# Patient Record
Sex: MALE | Race: WHITE | Employment: OTHER | URBAN - METROPOLITAN AREA
[De-identification: names, ages, dates, MRNs, and addresses within clinical notes are randomized per-mention and may not be internally consistent; named-entity substitution may affect disease eponyms.]

---

## 2023-10-29 ENCOUNTER — APPOINTMENT (OUTPATIENT)
Dept: GENERAL RADIOLOGY | Age: 23
DRG: 871 | End: 2023-10-29
Payer: MEDICARE

## 2023-10-29 ENCOUNTER — HOSPITAL ENCOUNTER (INPATIENT)
Age: 23
LOS: 3 days | Discharge: HOME | DRG: 871 | End: 2023-11-02
Attending: STUDENT IN AN ORGANIZED HEALTH CARE EDUCATION/TRAINING PROGRAM | Admitting: INTERNAL MEDICINE
Payer: MEDICARE

## 2023-10-29 DIAGNOSIS — R50.9 FEVER, UNSPECIFIED FEVER CAUSE: ICD-10-CM

## 2023-10-29 DIAGNOSIS — R56.9 SEIZURE (HCC): Primary | ICD-10-CM

## 2023-10-29 LAB
ANION GAP SERPL CALCULATED.3IONS-SCNC: 12 MMOL/L (ref 3–16)
BASOPHILS # BLD: 0 K/UL (ref 0–0.2)
BASOPHILS NFR BLD: 0.1 %
BUN SERPL-MCNC: 21 MG/DL (ref 7–20)
CALCIUM SERPL-MCNC: 11.1 MG/DL (ref 8.3–10.6)
CHLORIDE SERPL-SCNC: 103 MMOL/L (ref 99–110)
CO2 SERPL-SCNC: 25 MMOL/L (ref 21–32)
CREAT SERPL-MCNC: <0.5 MG/DL (ref 0.9–1.3)
DEPRECATED RDW RBC AUTO: 13.3 % (ref 12.4–15.4)
EOSINOPHIL # BLD: 0 K/UL (ref 0–0.6)
EOSINOPHIL NFR BLD: 0.6 %
FLUAV RNA RESP QL NAA+PROBE: NOT DETECTED
FLUBV RNA RESP QL NAA+PROBE: NOT DETECTED
GFR SERPLBLD CREATININE-BSD FMLA CKD-EPI: >60 ML/MIN/{1.73_M2}
GLUCOSE SERPL-MCNC: 92 MG/DL (ref 70–99)
HCT VFR BLD AUTO: 43.6 % (ref 40.5–52.5)
HGB BLD-MCNC: 14.8 G/DL (ref 13.5–17.5)
LYMPHOCYTES # BLD: 1 K/UL (ref 1–5.1)
LYMPHOCYTES NFR BLD: 25.9 %
MCH RBC QN AUTO: 32.9 PG (ref 26–34)
MCHC RBC AUTO-ENTMCNC: 33.9 G/DL (ref 31–36)
MCV RBC AUTO: 97 FL (ref 80–100)
MONOCYTES # BLD: 0.3 K/UL (ref 0–1.3)
MONOCYTES NFR BLD: 6.9 %
NEUTROPHILS # BLD: 2.6 K/UL (ref 1.7–7.7)
NEUTROPHILS NFR BLD: 66.5 %
PLATELET # BLD AUTO: 132 K/UL (ref 135–450)
PLATELET # BLD AUTO: ABNORMAL K/UL (ref 135–450)
PMV BLD AUTO: ABNORMAL FL (ref 5–10.5)
POTASSIUM SERPL-SCNC: 5.4 MMOL/L (ref 3.5–5.1)
RBC # BLD AUTO: 4.49 M/UL (ref 4.2–5.9)
SARS-COV-2 RNA RESP QL NAA+PROBE: NOT DETECTED
SODIUM SERPL-SCNC: 140 MMOL/L (ref 136–145)
VALPROATE SERPL-MCNC: 83.4 UG/ML (ref 50–100)
WBC # BLD AUTO: 3.9 K/UL (ref 4–11)

## 2023-10-29 PROCEDURE — 85049 AUTOMATED PLATELET COUNT: CPT

## 2023-10-29 PROCEDURE — 87636 SARSCOV2 & INF A&B AMP PRB: CPT

## 2023-10-29 PROCEDURE — 6370000000 HC RX 637 (ALT 250 FOR IP): Performed by: STUDENT IN AN ORGANIZED HEALTH CARE EDUCATION/TRAINING PROGRAM

## 2023-10-29 PROCEDURE — 80164 ASSAY DIPROPYLACETIC ACD TOT: CPT

## 2023-10-29 PROCEDURE — 80048 BASIC METABOLIC PNL TOTAL CA: CPT

## 2023-10-29 PROCEDURE — 80177 DRUG SCRN QUAN LEVETIRACETAM: CPT

## 2023-10-29 PROCEDURE — 99285 EMERGENCY DEPT VISIT HI MDM: CPT

## 2023-10-29 PROCEDURE — 36415 COLL VENOUS BLD VENIPUNCTURE: CPT

## 2023-10-29 PROCEDURE — 71045 X-RAY EXAM CHEST 1 VIEW: CPT

## 2023-10-29 PROCEDURE — 0202U NFCT DS 22 TRGT SARS-COV-2: CPT

## 2023-10-29 PROCEDURE — 85025 COMPLETE CBC W/AUTO DIFF WBC: CPT

## 2023-10-29 RX ORDER — DIVALPROEX SODIUM 500 MG/1
500 TABLET, DELAYED RELEASE ORAL ONCE
Status: DISCONTINUED | OUTPATIENT
Start: 2023-10-29 | End: 2023-10-29

## 2023-10-29 RX ORDER — LEVETIRACETAM 100 MG/ML
800 SOLUTION ORAL 2 TIMES DAILY
Status: ON HOLD | COMMUNITY
End: 2023-11-02 | Stop reason: HOSPADM

## 2023-10-29 RX ORDER — DIVALPROEX SODIUM 500 MG/1
500 TABLET, DELAYED RELEASE ORAL 2 TIMES DAILY
COMMUNITY
End: 2023-10-30 | Stop reason: ALTCHOICE

## 2023-10-29 RX ORDER — ACETAMINOPHEN 160 MG/5ML
650 LIQUID ORAL ONCE
Status: COMPLETED | OUTPATIENT
Start: 2023-10-29 | End: 2023-10-29

## 2023-10-29 RX ORDER — DIVALPROEX SODIUM 125 MG/1
500 CAPSULE, COATED PELLETS ORAL ONCE
Status: COMPLETED | OUTPATIENT
Start: 2023-10-29 | End: 2023-10-29

## 2023-10-29 RX ORDER — LEVETIRACETAM 500 MG/5ML
800 INJECTION, SOLUTION, CONCENTRATE INTRAVENOUS ONCE
Status: COMPLETED | OUTPATIENT
Start: 2023-10-29 | End: 2023-10-30

## 2023-10-29 RX ADMIN — ACETAMINOPHEN 650 MG: 160 SOLUTION ORAL at 22:48

## 2023-10-29 RX ADMIN — DIVALPROEX SODIUM 500 MG: 125 CAPSULE, COATED PELLETS ORAL at 21:04

## 2023-10-29 NOTE — ED NOTES
Mom, sister and nurse from facility at [facility], [address] [place] , [place]  10/29/23 1931

## 2023-10-30 ENCOUNTER — APPOINTMENT (OUTPATIENT)
Dept: CT IMAGING | Age: 23
DRG: 871 | End: 2023-10-30
Payer: MEDICARE

## 2023-10-30 PROBLEM — Z86.79 HX OF MYOCARDITIS: Status: ACTIVE | Noted: 2023-10-30

## 2023-10-30 PROBLEM — A41.9 SEPSIS (HCC): Status: ACTIVE | Noted: 2023-10-30

## 2023-10-30 PROBLEM — R50.9 FEVER: Status: ACTIVE | Noted: 2023-10-30

## 2023-10-30 PROBLEM — R56.9 SEIZURE (HCC): Status: ACTIVE | Noted: 2023-10-30

## 2023-10-30 PROBLEM — R62.50 DEVELOPMENTAL DELAY: Status: ACTIVE | Noted: 2023-10-30

## 2023-10-30 LAB
ALBUMIN SERPL-MCNC: 4 G/DL (ref 3.4–5)
ALBUMIN/GLOB SERPL: 1.4 {RATIO} (ref 1.1–2.2)
ALP SERPL-CCNC: 54 U/L (ref 40–129)
ALT SERPL-CCNC: 12 U/L (ref 10–40)
AMMONIA PLAS-SCNC: 111 UMOL/L (ref 16–60)
ANION GAP SERPL CALCULATED.3IONS-SCNC: 12 MMOL/L (ref 3–16)
AST SERPL-CCNC: 25 U/L (ref 15–37)
BILIRUB SERPL-MCNC: 0.3 MG/DL (ref 0–1)
BILIRUB UR QL STRIP.AUTO: NEGATIVE
BUN SERPL-MCNC: 14 MG/DL (ref 7–20)
CALCIUM SERPL-MCNC: 9.8 MG/DL (ref 8.3–10.6)
CHLORIDE SERPL-SCNC: 106 MMOL/L (ref 99–110)
CK SERPL-CCNC: 233 U/L (ref 39–308)
CLARITY UR: CLEAR
CO2 SERPL-SCNC: 19 MMOL/L (ref 21–32)
COLOR UR: YELLOW
CREAT SERPL-MCNC: <0.5 MG/DL (ref 0.9–1.3)
CRP SERPL-MCNC: 128.7 MG/L (ref 0–5.1)
GFR SERPLBLD CREATININE-BSD FMLA CKD-EPI: >60 ML/MIN/{1.73_M2}
GLUCOSE SERPL-MCNC: 96 MG/DL (ref 70–99)
GLUCOSE UR STRIP.AUTO-MCNC: NEGATIVE MG/DL
HGB UR QL STRIP.AUTO: NEGATIVE
KETONES UR STRIP.AUTO-MCNC: NEGATIVE MG/DL
LACTATE BLDV-SCNC: 2.6 MMOL/L (ref 0.4–1.9)
LEUKOCYTE ESTERASE UR QL STRIP.AUTO: NEGATIVE
LEVETIRACETAM SERPL-MCNC: 11.9 UG/ML (ref 6–46)
MEDICATION DOSE-MCNC: NORMAL
NITRITE UR QL STRIP.AUTO: NEGATIVE
PH UR STRIP.AUTO: 8.5 [PH] (ref 5–8)
POTASSIUM SERPL-SCNC: 4 MMOL/L (ref 3.5–5.1)
PROCALCITONIN SERPL IA-MCNC: 6.74 NG/ML (ref 0–0.15)
PROCALCITONIN SERPL IA-MCNC: 8.31 NG/ML (ref 0–0.15)
PROT SERPL-MCNC: 6.8 G/DL (ref 6.4–8.2)
PROT UR STRIP.AUTO-MCNC: NEGATIVE MG/DL
REPORT: NORMAL
RESP PATH DNA+RNA PNL NPH NAA+NON-PROBE: NORMAL
SODIUM SERPL-SCNC: 137 MMOL/L (ref 136–145)
SP GR UR STRIP.AUTO: 1.01 (ref 1–1.03)
TROPONIN, HIGH SENSITIVITY: 19 NG/L (ref 0–22)
TROPONIN, HIGH SENSITIVITY: 23 NG/L (ref 0–22)
TSH SERPL DL<=0.005 MIU/L-ACNC: 1.39 UIU/ML (ref 0.27–4.2)
UA COMPLETE W REFLEX CULTURE PNL UR: ABNORMAL
UA DIPSTICK W REFLEX MICRO PNL UR: ABNORMAL
URN SPEC COLLECT METH UR: ABNORMAL
UROBILINOGEN UR STRIP-ACNC: 0.2 E.U./DL
VALPROATE SERPL-MCNC: 106.1 UG/ML (ref 50–100)

## 2023-10-30 PROCEDURE — 86140 C-REACTIVE PROTEIN: CPT

## 2023-10-30 PROCEDURE — 2060000000 HC ICU INTERMEDIATE R&B

## 2023-10-30 PROCEDURE — 83605 ASSAY OF LACTIC ACID: CPT

## 2023-10-30 PROCEDURE — 81003 URINALYSIS AUTO W/O SCOPE: CPT

## 2023-10-30 PROCEDURE — 84443 ASSAY THYROID STIM HORMONE: CPT

## 2023-10-30 PROCEDURE — 80053 COMPREHEN METABOLIC PANEL: CPT

## 2023-10-30 PROCEDURE — 84145 PROCALCITONIN (PCT): CPT

## 2023-10-30 PROCEDURE — 36415 COLL VENOUS BLD VENIPUNCTURE: CPT

## 2023-10-30 PROCEDURE — 70450 CT HEAD/BRAIN W/O DYE: CPT

## 2023-10-30 PROCEDURE — 92610 EVALUATE SWALLOWING FUNCTION: CPT

## 2023-10-30 PROCEDURE — 80164 ASSAY DIPROPYLACETIC ACD TOT: CPT

## 2023-10-30 PROCEDURE — 2580000003 HC RX 258: Performed by: STUDENT IN AN ORGANIZED HEALTH CARE EDUCATION/TRAINING PROGRAM

## 2023-10-30 PROCEDURE — 6370000000 HC RX 637 (ALT 250 FOR IP): Performed by: INTERNAL MEDICINE

## 2023-10-30 PROCEDURE — 99223 1ST HOSP IP/OBS HIGH 75: CPT | Performed by: PSYCHIATRY & NEUROLOGY

## 2023-10-30 PROCEDURE — 87449 NOS EACH ORGANISM AG IA: CPT

## 2023-10-30 PROCEDURE — 92526 ORAL FUNCTION THERAPY: CPT

## 2023-10-30 PROCEDURE — 71260 CT THORAX DX C+: CPT

## 2023-10-30 PROCEDURE — 2580000003 HC RX 258

## 2023-10-30 PROCEDURE — 99223 1ST HOSP IP/OBS HIGH 75: CPT

## 2023-10-30 PROCEDURE — 6360000002 HC RX W HCPCS: Performed by: STUDENT IN AN ORGANIZED HEALTH CARE EDUCATION/TRAINING PROGRAM

## 2023-10-30 PROCEDURE — 82550 ASSAY OF CK (CPK): CPT

## 2023-10-30 PROCEDURE — 6360000004 HC RX CONTRAST MEDICATION

## 2023-10-30 PROCEDURE — 93306 TTE W/DOPPLER COMPLETE: CPT

## 2023-10-30 PROCEDURE — 82140 ASSAY OF AMMONIA: CPT

## 2023-10-30 PROCEDURE — 6370000000 HC RX 637 (ALT 250 FOR IP): Performed by: STUDENT IN AN ORGANIZED HEALTH CARE EDUCATION/TRAINING PROGRAM

## 2023-10-30 PROCEDURE — 84484 ASSAY OF TROPONIN QUANT: CPT

## 2023-10-30 PROCEDURE — 96374 THER/PROPH/DIAG INJ IV PUSH: CPT

## 2023-10-30 PROCEDURE — 87040 BLOOD CULTURE FOR BACTERIA: CPT

## 2023-10-30 RX ORDER — DIVALPROEX SODIUM 500 MG/1
TABLET, DELAYED RELEASE ORAL
Status: DISCONTINUED
Start: 2023-10-30 | End: 2023-10-30 | Stop reason: WASHOUT

## 2023-10-30 RX ORDER — DEXAMETHASONE SODIUM PHOSPHATE 10 MG/ML
10 INJECTION, SOLUTION INTRAMUSCULAR; INTRAVENOUS EVERY 6 HOURS
Status: DISCONTINUED | OUTPATIENT
Start: 2023-10-30 | End: 2023-10-31

## 2023-10-30 RX ORDER — ONDANSETRON 2 MG/ML
4 INJECTION INTRAMUSCULAR; INTRAVENOUS EVERY 6 HOURS PRN
Status: DISCONTINUED | OUTPATIENT
Start: 2023-10-30 | End: 2023-11-02 | Stop reason: HOSPADM

## 2023-10-30 RX ORDER — SODIUM CHLORIDE 0.9 % (FLUSH) 0.9 %
5-40 SYRINGE (ML) INJECTION PRN
Status: DISCONTINUED | OUTPATIENT
Start: 2023-10-30 | End: 2023-11-02 | Stop reason: HOSPADM

## 2023-10-30 RX ORDER — PANTOPRAZOLE SODIUM 40 MG/10ML
40 INJECTION, POWDER, LYOPHILIZED, FOR SOLUTION INTRAVENOUS DAILY
Status: DISCONTINUED | OUTPATIENT
Start: 2023-10-30 | End: 2023-11-02 | Stop reason: HOSPADM

## 2023-10-30 RX ORDER — LACTULOSE 10 G/15ML
20 SOLUTION ORAL 3 TIMES DAILY
Status: DISCONTINUED | OUTPATIENT
Start: 2023-10-30 | End: 2023-11-02 | Stop reason: HOSPADM

## 2023-10-30 RX ORDER — LEVETIRACETAM 100 MG/ML
800 SOLUTION ORAL 2 TIMES DAILY
Status: DISCONTINUED | OUTPATIENT
Start: 2023-10-30 | End: 2023-10-30

## 2023-10-30 RX ORDER — DIVALPROEX SODIUM 125 MG/1
500 CAPSULE, COATED PELLETS ORAL 2 TIMES DAILY
Status: ON HOLD | COMMUNITY
End: 2023-11-02 | Stop reason: HOSPADM

## 2023-10-30 RX ORDER — SODIUM CHLORIDE 9 MG/ML
INJECTION, SOLUTION INTRAVENOUS PRN
Status: DISCONTINUED | OUTPATIENT
Start: 2023-10-30 | End: 2023-11-02 | Stop reason: HOSPADM

## 2023-10-30 RX ORDER — SODIUM CHLORIDE, SODIUM LACTATE, POTASSIUM CHLORIDE, CALCIUM CHLORIDE 600; 310; 30; 20 MG/100ML; MG/100ML; MG/100ML; MG/100ML
INJECTION, SOLUTION INTRAVENOUS CONTINUOUS
Status: ACTIVE | OUTPATIENT
Start: 2023-10-30 | End: 2023-10-30

## 2023-10-30 RX ORDER — LEVETIRACETAM 100 MG/ML
800 SOLUTION ORAL 2 TIMES DAILY
Status: DISCONTINUED | OUTPATIENT
Start: 2023-10-30 | End: 2023-11-01

## 2023-10-30 RX ORDER — ACETAMINOPHEN 325 MG/1
650 TABLET ORAL EVERY 6 HOURS PRN
Status: DISCONTINUED | OUTPATIENT
Start: 2023-10-30 | End: 2023-11-02 | Stop reason: HOSPADM

## 2023-10-30 RX ORDER — SODIUM CHLORIDE 0.9 % (FLUSH) 0.9 %
5-40 SYRINGE (ML) INJECTION EVERY 12 HOURS SCHEDULED
Status: DISCONTINUED | OUTPATIENT
Start: 2023-10-30 | End: 2023-11-02 | Stop reason: HOSPADM

## 2023-10-30 RX ORDER — ACETAMINOPHEN 650 MG/1
650 SUPPOSITORY RECTAL EVERY 6 HOURS PRN
Status: DISCONTINUED | OUTPATIENT
Start: 2023-10-30 | End: 2023-11-02 | Stop reason: HOSPADM

## 2023-10-30 RX ORDER — MAGNESIUM SULFATE IN WATER 40 MG/ML
2000 INJECTION, SOLUTION INTRAVENOUS PRN
Status: DISCONTINUED | OUTPATIENT
Start: 2023-10-30 | End: 2023-11-02 | Stop reason: HOSPADM

## 2023-10-30 RX ORDER — DIVALPROEX SODIUM 125 MG/1
500 CAPSULE, COATED PELLETS ORAL EVERY 12 HOURS SCHEDULED
Status: DISCONTINUED | OUTPATIENT
Start: 2023-10-30 | End: 2023-11-01

## 2023-10-30 RX ORDER — LEVETIRACETAM 500 MG/5ML
INJECTION, SOLUTION, CONCENTRATE INTRAVENOUS
Status: DISCONTINUED
Start: 2023-10-30 | End: 2023-10-30 | Stop reason: WASHOUT

## 2023-10-30 RX ORDER — 0.9 % SODIUM CHLORIDE 0.9 %
500 INTRAVENOUS SOLUTION INTRAVENOUS ONCE
Status: COMPLETED | OUTPATIENT
Start: 2023-10-30 | End: 2023-10-30

## 2023-10-30 RX ORDER — 0.9 % SODIUM CHLORIDE 0.9 %
1000 INTRAVENOUS SOLUTION INTRAVENOUS ONCE
Status: COMPLETED | OUTPATIENT
Start: 2023-10-30 | End: 2023-10-30

## 2023-10-30 RX ORDER — DIVALPROEX SODIUM 500 MG/1
500 TABLET, DELAYED RELEASE ORAL 2 TIMES DAILY
Status: DISCONTINUED | OUTPATIENT
Start: 2023-10-30 | End: 2023-10-30 | Stop reason: ALTCHOICE

## 2023-10-30 RX ORDER — SODIUM CHLORIDE 9 MG/ML
INJECTION, SOLUTION INTRAVENOUS CONTINUOUS
Status: DISCONTINUED | OUTPATIENT
Start: 2023-10-30 | End: 2023-10-30

## 2023-10-30 RX ORDER — ONDANSETRON 4 MG/1
4 TABLET, ORALLY DISINTEGRATING ORAL EVERY 8 HOURS PRN
Status: DISCONTINUED | OUTPATIENT
Start: 2023-10-30 | End: 2023-11-02 | Stop reason: HOSPADM

## 2023-10-30 RX ORDER — ENOXAPARIN SODIUM 100 MG/ML
30 INJECTION SUBCUTANEOUS EVERY EVENING
Status: DISCONTINUED | OUTPATIENT
Start: 2023-10-31 | End: 2023-11-02 | Stop reason: HOSPADM

## 2023-10-30 RX ORDER — POTASSIUM CHLORIDE 7.45 MG/ML
10 INJECTION INTRAVENOUS PRN
Status: DISCONTINUED | OUTPATIENT
Start: 2023-10-30 | End: 2023-11-02 | Stop reason: HOSPADM

## 2023-10-30 RX ORDER — DIVALPROEX SODIUM 500 MG/1
TABLET, EXTENDED RELEASE ORAL
Status: DISPENSED
Start: 2023-10-30 | End: 2023-10-30

## 2023-10-30 RX ADMIN — SODIUM CHLORIDE, POTASSIUM CHLORIDE, SODIUM LACTATE AND CALCIUM CHLORIDE: 600; 310; 30; 20 INJECTION, SOLUTION INTRAVENOUS at 12:48

## 2023-10-30 RX ADMIN — SODIUM CHLORIDE 500 ML: 9 INJECTION, SOLUTION INTRAVENOUS at 03:05

## 2023-10-30 RX ADMIN — DEXAMETHASONE SODIUM PHOSPHATE 10 MG: 10 INJECTION, SOLUTION INTRAMUSCULAR; INTRAVENOUS at 05:45

## 2023-10-30 RX ADMIN — IOPAMIDOL 75 ML: 755 INJECTION, SOLUTION INTRAVENOUS at 09:30

## 2023-10-30 RX ADMIN — DEXAMETHASONE SODIUM PHOSPHATE 10 MG: 10 INJECTION, SOLUTION INTRAMUSCULAR; INTRAVENOUS at 12:45

## 2023-10-30 RX ADMIN — CEFTRIAXONE SODIUM 2000 MG: 2 INJECTION, POWDER, FOR SOLUTION INTRAMUSCULAR; INTRAVENOUS at 17:22

## 2023-10-30 RX ADMIN — VANCOMYCIN HYDROCHLORIDE 1250 MG: 10 INJECTION, POWDER, LYOPHILIZED, FOR SOLUTION INTRAVENOUS at 08:40

## 2023-10-30 RX ADMIN — DEXAMETHASONE SODIUM PHOSPHATE 10 MG: 10 INJECTION, SOLUTION INTRAMUSCULAR; INTRAVENOUS at 18:22

## 2023-10-30 RX ADMIN — LEVETIRACETAM 800 MG: 100 INJECTION, SOLUTION INTRAVENOUS at 01:43

## 2023-10-30 RX ADMIN — Medication 800 MG: at 21:09

## 2023-10-30 RX ADMIN — VANCOMYCIN HYDROCHLORIDE 1250 MG: 10 INJECTION, POWDER, LYOPHILIZED, FOR SOLUTION INTRAVENOUS at 18:31

## 2023-10-30 RX ADMIN — LACTULOSE 20 G: 10 SOLUTION ORAL at 21:59

## 2023-10-30 RX ADMIN — DIVALPROEX SODIUM 500 MG: 125 CAPSULE, COATED PELLETS ORAL at 08:15

## 2023-10-30 RX ADMIN — SODIUM CHLORIDE 1000 ML: 9 INJECTION, SOLUTION INTRAVENOUS at 05:51

## 2023-10-30 RX ADMIN — CEFTRIAXONE SODIUM 2000 MG: 2 INJECTION, POWDER, FOR SOLUTION INTRAMUSCULAR; INTRAVENOUS at 05:52

## 2023-10-30 RX ADMIN — DEXAMETHASONE SODIUM PHOSPHATE 10 MG: 10 INJECTION, SOLUTION INTRAMUSCULAR; INTRAVENOUS at 23:44

## 2023-10-30 RX ADMIN — SODIUM CHLORIDE, PRESERVATIVE FREE 10 ML: 5 INJECTION INTRAVENOUS at 13:45

## 2023-10-30 RX ADMIN — Medication 800 MG: at 08:29

## 2023-10-30 ASSESSMENT — LIFESTYLE VARIABLES
HOW OFTEN DO YOU HAVE A DRINK CONTAINING ALCOHOL: NEVER
HOW MANY STANDARD DRINKS CONTAINING ALCOHOL DO YOU HAVE ON A TYPICAL DAY: PATIENT DOES NOT DRINK

## 2023-10-30 NOTE — PROGRESS NOTES
Speech Language Pathology  Swallowing Disorders and Dysphagia  Clinical Bedside Swallow Assessment  Facility/Department: [facility] PCU TELEMETRY    Instrumentation: Yes. MBSS is warranted to further assess oropharyngeal structures and function  Diet recommendation: IDDSI 4 Puree Solids; IDDSI 0 Thin Liquids; Meds crushed in puree as able  Risk management: upright for all intake, stay upright for at least 30 mins after intake, small bites/sips, assist feed, 1:1 supervision with intake, oral care 2-3x/day to reduce adverse affects in the event of aspiration, STRICT aspiration precautions, and hold PO and contact SLP if s/s of aspiration or worsening respiratory status develop. NAME:Jose Lou   : 2000 (20 y.o.)   MRN: [id number]  ROOM: [facility]/1923-57  ADMISSION DATE: 10/29/2023  PATIENT DIAGNOSIS(ES): Seizure ([address]) [R56.9]  Fever, unspecified fever cause [R50.9]  Chief Complaint   Patient presents with    Seizures     Hx of seizures caregiver states pt hasn't had seizures In 5 years states had one last week and today     Patient Active Problem List    Diagnosis Date Noted    Seizure ([address]) 10/30/2023    Fever 10/30/2023    Sepsis ([address]) 10/30/2023    Developmental delay 10/30/2023    Hx of myocarditis 10/30/2023     Past Medical History:   Diagnosis Date    Developmental non-verbal disorder     Murmur, heart     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 Aspiration is a diagnostic consideration.   2. [phone]

## 2023-10-30 NOTE — PROGRESS NOTES
Dr Frida Gonzalez made aware patient mother/ legal guardian named Minh  is refusing the new order of EEG. Patient mother also stated ativan does not work as a seizure breakthrough for the patient. They have used in the past she believes was Clonazepam. She stated it has been years since needing the medication.

## 2023-10-30 NOTE — ED NOTES
Mother refused UA, pt difficult stick uncooperative, stuck x2 unsuccessful at [address], [address], [place]  10/29/23 2033

## 2023-10-30 NOTE — CARE COORDINATION
Case Management Assessment  Initial Evaluation    Date/Time of Evaluation: 10/30/2023 4:02 PM  Assessment Completed by: Tara Han    If patient is discharged prior to next notation, then this note serves as note for discharge by case management. Patient Name: Tiffany Rios                   YOB: 2000  Diagnosis: Seizure ([address]) [R56.9]  Fever, unspecified fever cause [R50.9]                   Date / Time: 10/29/2023  5:38 PM    Patient Admission Status: Inpatient   Readmission Risk (Low < 19, Mod (19-27), High > 27): Readmission Risk Score: 10.9    Current PCP: No primary care provider on file. PCP verified by CM? Yes    Chart Reviewed: Yes      History Provided by: Child/Family (mother Joseline Benson)  Patient Orientation: Person, Alert and Oriented    Patient Cognition: Alert    Hospitalization in the last 30 days (Readmission):  No    If yes, Readmission Assessment in CM Navigator will be completed. Advance Directives:      Code Status: Full Code   Patient's Primary Decision Maker is: Legal Next of Kin    Primary Decision Maker: Sonja Sanchez - Parent, Legal Arianne Solares - [phone]    Discharge Planning:    Patient lives with: Other (Comment) (group home) Type of Home: Group Home  Primary Care Giver:  Other (Comment) (Lives at [address] [place] [address])  Patient Support Systems include: Family Members, Other (Comment) (group home)   Current Financial resources: Medicare  Current community resources: None  Current services prior to admission: None            Current DME:              Type of Home Care services:  None    ADLS  Prior functional level: Assistance with the following:, Bathing, Dressing, Toileting, Feeding, Cooking, Housework, Shopping, Mobility  Current functional level: Dressing, Bathing, Assistance with the following:, Feeding, Toileting, Cooking, Housework, Shopping, Mobility    PT AM-PAC:   /24  OT AM-PAC:   /24    Family can provide assistance at DC: No  Would you like

## 2023-10-30 NOTE — ED NOTES
Pt very difficult stick had to have clinical place iv with ultrasound, unable to get 2nd blood culture set     [place], [place]  10/30/23 8188

## 2023-10-30 NOTE — PLAN OF CARE
Problem: SLP Adult - Impaired Swallowing  Goal: By Discharge: Advance to least restrictive diet without signs or symptoms of aspiration for planned discharge setting. See evaluation for individualized goals. Note: SLP completed evaluation. Please refer to notes in EMR.       Gerri Art M.A., CF-SLP #[id number]  Speech-Language Pathologist  Desk: [phone]

## 2023-10-30 NOTE — ACP (ADVANCE CARE PLANNING)
Advance Care Planning     General Advance Care Planning (ACP) Conversation    Date of Conversation: 10/29/2023  Conducted with: Patient with Decision Making Capacity    Healthcare Decision Maker:    Primary Decision Maker: Ashley Alan - Parent, Legal Guardian - [phone]  Click here to complete [address] including selection of the Healthcare Decision Maker Relationship (ie \"Primary\"). Today we documented Decision Maker(s) consistent with Legal Next of Kin hierarchy.     Content/Action Overview:  DECLINED ACP Conversation - will revisit periodically  Reviewed DNR/DNI and patient elects Full Code (Attempt Resuscitation)        Length of Voluntary ACP Conversation in minutes:  <16 minutes (Non-Billable)    Gavino Newer

## 2023-10-30 NOTE — ED PROVIDER NOTES
Patient signed out to me by previous physician. Patient is a 23-year-old male with significant past medical history including history of seizures, developmental delay presenting with concern for seizure at home. Patient initially was afebrile but then developed fever in the ED. Chest x-ray was nonacute. CBC revealed a mild leukopenia at 3.9. Hemoglobin is within normal limits. BMP reveals mild hyper per kalemia likely secondary to hemolysis otherwise BMP is nonactionable. COVID and flu testing are negative. Blood cultures were ordered and pending. I did add on lactate which was mildly elevated 2.6. Will order fluids and repeat lactate. At time of signout pending urinalysis and plan is to admit patient to hospital service. Given seizure with fever today and order CT head. Did discuss lumbar puncture with mother discussion with mother would like to hold off until IR can perform LP. In discussion with hospitalist will empirically start antibiotics. Patient met the hospital service for further evaluation and treatment.      Pia Ramirez MD  11/05/23 5217

## 2023-10-30 NOTE — PROGRESS NOTES
Patient admitted to room 304 from er. Patient oriented to room, call light, bed rails, phone, lights and bathroom. Patient instructed about the schedule of the day including: vital sign frequency, lab draws, possible tests, frequency of MD and staff rounds, daily weights, I &O's and prescribed diet. Telemetry box in place, patient aware of placement and reason. Bed locked, in lowest position, side rails up 2/4, call light within reach. Recliner Assessment  Patient is not able to demonstrated the ability to move from a reclining position to an upright position within the recliner. Patient is confused, demented and /or unable to follow instruction. 4 Eyes Skin Assessment     NAME:  Juan Marrero  YOB: 2000  MEDICAL RECORD NUMBER:  [id number]    The patient is being assessed for  Admission    I agree that at least one RN has performed a thorough Head to Toe Skin Assessment on the patient. ALL assessment sites listed below have been assessed. Areas assessed by both nurses: scattered bruising and abrasion,     Head, Face, Ears, Shoulders, Back, Chest, Arms, Elbows, Hands, Sacrum. Buttock, Coccyx, Ischium, Legs. Feet and Heels, and Under Medical Devices         Does the Patient have a Wound?  No noted wound(s)       Priyank Prevention initiated by RN: No  Wound Care Orders initiated by RN: No    Pressure Injury (Stage 3,4, Unstageable, DTI, NWPT, and Complex wounds) if present, place Wound referral order by RN under : No    New Ostomies, if present place, Ostomy referral order under : No     Nurse 1 eSignature: Electronically signed by Karina Clemons RN on 10/30/23 at 2:47 PM EDT    **SHARE this note so that the co-signing nurse can place an eSignature**    Nurse 2 eSignature: Electronically signed by Debi Irizarry RN on 10/30/23 at [address] [facility] PM EDT

## 2023-10-30 NOTE — PROGRESS NOTES
New Telemetry box number assigned to Patient      To be filled out by [facility]    Patient assigned to tele box number: __________________               (to be written in by [facility] when telemetry box is assigned to patient)    ___________________________________________________________________________      Bedside RN confirming that the box listed above is in fact the telemetry box number being placed on the patient listed above.         X________________________________________ RN signature        __________________________________________RN assigned to Patient (please print)    _______________ Date    ____________ Time

## 2023-10-30 NOTE — CONSULTS
Neurology consultation note    Patient name: Raina Palomino      Chief Complaint:  Breakthrough seizure. History of present illness: This is a 20years old male who was brought to the ER last night due to breakthrough seizure. The patient is noted for history of seizure disorder and developmental delay. Per medical records, the patient had been seizure-free for at least 5 years prior to this breakthrough seizure. The patient is currently on Depakote and levetiracetam.  The patient had negative CT brain upon admission. The patient also ha febrile illness of admission. There is a concern for possible pneumonia.     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     Seizures (HCC)     Wheelchair dependent        Past surgical history:    Past Surgical History:   Procedure Laterality Date    CARDIAC SURGERY      SPINAL FIXATION SURGERY          Medication:    Current Facility-Administered Medications   Medication Dose Route Frequency Provider Last Rate Last Admin    cefTRIAXone (ROCEPHIN) 2,000 mg in sodium chloride 0.9 % 50 mL IVPB (mini-bag)  2,000 mg IntraVENous Q12H Gay Gerardo  mL/hr at 10/30/23 0552 2,000 mg at 10/30/23 0552    dexamethasone (PF) (DECADRON) injection 10 mg  10 mg IntraVENous Q6H Gay Gerardo MD   10 mg at 10/30/23 0545    vancomycin (VANCOCIN) 1,250 mg in sodium chloride 0.9 % 250 mL IVPB  1,250 mg IntraVENous Q12H Gay Gerardo .7 mL/hr at 10/30/23 0840 1,250 mg at 10/30/23 0840    lactated ringers IV soln infusion   IntraVENous Continuous Gay Gerardo MD        sodium chloride flush 0.9 % injection 5-40 mL  5-40 mL IntraVENous 2 times per day Gay Gerardo MD        sodium chloride flush 0.9 % injection 5-40 mL  5-40 mL IntraVENous PRN Gay Gerardo MD        0.9 % sodium chloride infusion   IntraVENous PRN Gay Gerardo

## 2023-10-30 NOTE — CONSULTS
Pharmacy Note  Vancomycin Consult    Angelica Darling is a 20 y.o. male started on Vancomycin for suspected meningitis; consult received from Dr. Eloise Fothergill to manage therapy. Also receiving the following antibiotics: ceftriaxone. Recent Labs     10/29/23  2030   BUN 21*   CREATININE <0.5*   WBC 3.9*       Estimated Creatinine Clearance: 131 mL/min (based on SCr of 0.5 mg/dL). Goal Trough Level: 15-20 mcg/mL  Goal AUC: 400-600 mg/L    Assessment/Plan:  Will initiate Vancomycin with 1250 mg IV every 12 hours. Per Pk-insight:  XJF52,CF: 409 mg/L.hr  Probability of AUC24 > 400: 75 %  Ctrough,ss: 12.6 mg/L  Probability of Ctrough,ss > 20: 20 %  Probability of nephrotoxicity (Lodise CRISSY 2009): 8 %    Next trough: 10/31 @ 0500    Thank you for the consult. Will continue to follow.     Wendy Snyder, PharmD, [facility], 10/30/2023 5:19 AM

## 2023-10-30 NOTE — ED NOTES
Pt uncooperative with obtaining vitals. Pt's skin is warm pink and dry. Respirations are unlabored. Pulse 72 via radial pulse.       Melody Driscoll RN  10/30/23 9879

## 2023-10-30 NOTE — PROGRESS NOTES
Updated Dr Nery Springer on patients critical lab level of ammonia 111. New orders of lactulose, and hold Depakote. Made patients mother aware, she stated she is not letting us stop his Depakote. This writer updated Dr Nery Springer on patients mothers resistance. Dr Nery Springer responded back   \"His ammonia level is elevated due to depakote . . ( it is a medication side effect) Once level comes down , they can resume Depakote\". Patient mother stepped out of the room, this writer attempted to call patients mother and update her, phone call went to voicemail. Addendum @ 1930 spoke with patients mother and updated her on Dr Laurie Henson response of still wanting to hold Depakote.

## 2023-10-31 ENCOUNTER — APPOINTMENT (OUTPATIENT)
Dept: GENERAL RADIOLOGY | Age: 23
DRG: 871 | End: 2023-10-31
Payer: MEDICARE

## 2023-10-31 LAB
AMMONIA PLAS-SCNC: 77 UMOL/L (ref 16–60)
ANION GAP SERPL CALCULATED.3IONS-SCNC: 11 MMOL/L (ref 3–16)
BASOPHILS # BLD: 0 K/UL (ref 0–0.2)
BASOPHILS NFR BLD: 0.2 %
BUN SERPL-MCNC: 20 MG/DL (ref 7–20)
CALCIUM SERPL-MCNC: 10.1 MG/DL (ref 8.3–10.6)
CHLORIDE SERPL-SCNC: 107 MMOL/L (ref 99–110)
CO2 SERPL-SCNC: 15 MMOL/L (ref 21–32)
CREAT SERPL-MCNC: <0.5 MG/DL (ref 0.9–1.3)
DEPRECATED RDW RBC AUTO: 13.6 % (ref 12.4–15.4)
EOSINOPHIL # BLD: 0 K/UL (ref 0–0.6)
EOSINOPHIL NFR BLD: 0 %
GFR SERPLBLD CREATININE-BSD FMLA CKD-EPI: >60 ML/MIN/{1.73_M2}
GLUCOSE SERPL-MCNC: 202 MG/DL (ref 70–99)
HCT VFR BLD AUTO: 40.5 % (ref 40.5–52.5)
HGB BLD-MCNC: 13.6 G/DL (ref 13.5–17.5)
LYMPHOCYTES # BLD: 0.5 K/UL (ref 1–5.1)
LYMPHOCYTES NFR BLD: 2.7 %
MCH RBC QN AUTO: 33.3 PG (ref 26–34)
MCHC RBC AUTO-ENTMCNC: 33.6 G/DL (ref 31–36)
MCV RBC AUTO: 98.9 FL (ref 80–100)
MONOCYTES # BLD: 0.4 K/UL (ref 0–1.3)
MONOCYTES NFR BLD: 2 %
NEUTROPHILS # BLD: 17.2 K/UL (ref 1.7–7.7)
NEUTROPHILS NFR BLD: 95.1 %
PLATELET # BLD AUTO: 97 K/UL (ref 135–450)
PLATELET BLD QL SMEAR: ABNORMAL
PMV BLD AUTO: 9.6 FL (ref 5–10.5)
POTASSIUM SERPL-SCNC: 4.3 MMOL/L (ref 3.5–5.1)
RBC # BLD AUTO: 4.1 M/UL (ref 4.2–5.9)
SLIDE REVIEW: ABNORMAL
SODIUM SERPL-SCNC: 133 MMOL/L (ref 136–145)
VALPROATE SERPL-MCNC: 55.5 UG/ML (ref 50–100)
VANCOMYCIN TROUGH SERPL-MCNC: 7.9 UG/ML (ref 10–20)
WBC # BLD AUTO: 18.1 K/UL (ref 4–11)

## 2023-10-31 PROCEDURE — 2060000000 HC ICU INTERMEDIATE R&B

## 2023-10-31 PROCEDURE — 80164 ASSAY DIPROPYLACETIC ACD TOT: CPT

## 2023-10-31 PROCEDURE — 85025 COMPLETE CBC W/AUTO DIFF WBC: CPT

## 2023-10-31 PROCEDURE — 99233 SBSQ HOSP IP/OBS HIGH 50: CPT | Performed by: PSYCHIATRY & NEUROLOGY

## 2023-10-31 PROCEDURE — C9113 INJ PANTOPRAZOLE SODIUM, VIA: HCPCS

## 2023-10-31 PROCEDURE — 6360000002 HC RX W HCPCS: Performed by: STUDENT IN AN ORGANIZED HEALTH CARE EDUCATION/TRAINING PROGRAM

## 2023-10-31 PROCEDURE — 36415 COLL VENOUS BLD VENIPUNCTURE: CPT

## 2023-10-31 PROCEDURE — 80048 BASIC METABOLIC PNL TOTAL CA: CPT

## 2023-10-31 PROCEDURE — 82140 ASSAY OF AMMONIA: CPT

## 2023-10-31 PROCEDURE — 2580000003 HC RX 258

## 2023-10-31 PROCEDURE — 80202 ASSAY OF VANCOMYCIN: CPT

## 2023-10-31 PROCEDURE — 74230 X-RAY XM SWLNG FUNCJ C+: CPT

## 2023-10-31 PROCEDURE — 2580000003 HC RX 258: Performed by: STUDENT IN AN ORGANIZED HEALTH CARE EDUCATION/TRAINING PROGRAM

## 2023-10-31 PROCEDURE — 92611 MOTION FLUOROSCOPY/SWALLOW: CPT

## 2023-10-31 PROCEDURE — 6370000000 HC RX 637 (ALT 250 FOR IP): Performed by: STUDENT IN AN ORGANIZED HEALTH CARE EDUCATION/TRAINING PROGRAM

## 2023-10-31 PROCEDURE — 6360000002 HC RX W HCPCS

## 2023-10-31 PROCEDURE — 99233 SBSQ HOSP IP/OBS HIGH 50: CPT | Performed by: INTERNAL MEDICINE

## 2023-10-31 PROCEDURE — 92526 ORAL FUNCTION THERAPY: CPT

## 2023-10-31 PROCEDURE — 6360000002 HC RX W HCPCS: Performed by: INTERNAL MEDICINE

## 2023-10-31 RX ORDER — DEXAMETHASONE SODIUM PHOSPHATE 10 MG/ML
6 INJECTION, SOLUTION INTRAMUSCULAR; INTRAVENOUS EVERY 6 HOURS
Status: DISCONTINUED | OUTPATIENT
Start: 2023-11-01 | End: 2023-11-01

## 2023-10-31 RX ADMIN — PANTOPRAZOLE SODIUM 40 MG: 40 INJECTION, POWDER, FOR SOLUTION INTRAVENOUS at 08:36

## 2023-10-31 RX ADMIN — DIVALPROEX SODIUM 500 MG: 125 CAPSULE, COATED PELLETS ORAL at 18:28

## 2023-10-31 RX ADMIN — DEXAMETHASONE SODIUM PHOSPHATE 10 MG: 10 INJECTION, SOLUTION INTRAMUSCULAR; INTRAVENOUS at 12:09

## 2023-10-31 RX ADMIN — Medication 800 MG: at 08:34

## 2023-10-31 RX ADMIN — DEXAMETHASONE SODIUM PHOSPHATE 10 MG: 10 INJECTION, SOLUTION INTRAMUSCULAR; INTRAVENOUS at 18:11

## 2023-10-31 RX ADMIN — DEXAMETHASONE SODIUM PHOSPHATE 10 MG: 10 INJECTION, SOLUTION INTRAMUSCULAR; INTRAVENOUS at 04:56

## 2023-10-31 RX ADMIN — CEFTRIAXONE SODIUM 2000 MG: 2 INJECTION, POWDER, FOR SOLUTION INTRAMUSCULAR; INTRAVENOUS at 18:20

## 2023-10-31 RX ADMIN — CEFTRIAXONE SODIUM 2000 MG: 2 INJECTION, POWDER, FOR SOLUTION INTRAMUSCULAR; INTRAVENOUS at 04:59

## 2023-10-31 RX ADMIN — Medication 10 ML: at 08:51

## 2023-10-31 RX ADMIN — SODIUM CHLORIDE, PRESERVATIVE FREE 10 ML: 5 INJECTION INTRAVENOUS at 08:37

## 2023-10-31 RX ADMIN — DEXAMETHASONE SODIUM PHOSPHATE 6 MG: 10 INJECTION, SOLUTION INTRAMUSCULAR; INTRAVENOUS at 23:43

## 2023-10-31 RX ADMIN — VANCOMYCIN HYDROCHLORIDE 1250 MG: 10 INJECTION, POWDER, LYOPHILIZED, FOR SOLUTION INTRAVENOUS at 06:05

## 2023-10-31 RX ADMIN — VANCOMYCIN HYDROCHLORIDE 1250 MG: 10 INJECTION, POWDER, LYOPHILIZED, FOR SOLUTION INTRAVENOUS at 19:45

## 2023-10-31 RX ADMIN — Medication 800 MG: at 20:58

## 2023-10-31 RX ADMIN — SODIUM CHLORIDE: 9 INJECTION, SOLUTION INTRAVENOUS at 18:18

## 2023-10-31 NOTE — PROGRESS NOTES
Pharmacy Vancomycin Consult     Vancomycin Day: 2  Current Dosin mg q12h  Current indication: suspected meningitis    Recent Labs     10/29/23  2030 10/30/23  0602 10/31/23  0451   BUN 21* 14 20   CREATININE <0.5* <0.5* <0.5*   WBC 3.9*  --  18.1*       Estimated Creatinine Clearance: 126 mL/min (based on SCr of 0.5 mg/dL). Trough: 7.9  AUC: 462  Procal: 6.74 (10/30)    Assessment/Plan:  Will continue with current dose. Pharmacy will continue to monitor.    Next trough: 11/3 @ [address] Christiana Davalos, [facility], 10/31/2023 6:27 AM

## 2023-10-31 NOTE — PLAN OF CARE
Problem: Safety - Adult  Goal: Free from fall injury  Outcome: Progressing  Flowsheets (Taken 10/31/2023 0032)  Free From Fall Injury: Instruct family/caregiver on patient safety     Problem: Skin/Tissue Integrity  Goal: Absence of new skin breakdown  Description: 1. Monitor for areas of redness and/or skin breakdown  2. Assess vascular access sites hourly  3. Every 4-6 hours minimum:  Change oxygen saturation probe site  4. Every 4-6 hours:  If on nasal continuous positive airway pressure, respiratory therapy assess nares and determine need for appliance change or resting period. Outcome: Progressing     Problem: Neurosensory - Adult  Goal: Absence of seizures  Outcome: Progressing  Flowsheets (Taken 10/31/2023 0032)  Absence of seizures: Monitor for seizure activity.   If seizure occurs, document type and location of movements and any associated apnea

## 2023-10-31 NOTE — PROGRESS NOTES
Patient VS and assessment as noted per flowsheet  -  oriented to self only. Confused as to place. Stated she didn't realize she was in the hospital.  Denying need to void at present. Bed alarm remains active.

## 2023-10-31 NOTE — PROGRESS NOTES
Patients mother is refusing EEG Testing at this time. Per RN Kiya Bis was notified.  EEG was not completed and therefore not available for interpretation at this time

## 2023-10-31 NOTE — PROGRESS NOTES
Patient's mom refusing the Lactulose and inisiting to have patient's Depakote be given at the time. Rush Nocturnist and NP. NP won't change the order because of elevated Ammonia and Depakote level in the blood. Informed patient's mom. She wanted to talk to a doctor or she will sign AMA just to get the medicine. Informed Clinical Becka and Charge Nurse Melissa Bates.

## 2023-10-31 NOTE — PROCEDURES
SPEECH/LANGUAGE PATHOLOGY  Swallowing Disorders and Dysphagia  VIDEOFLUOROSCOPIC STUDY OF SWALLOWING (MBS)  Facility/Department: [facility] PCU TELEMETRY    Diet Recommendation: IDDSI 4 Puree Solids ; IDDSI 0 Thin Liquids; Meds crushed in puree as able  Risk Management: upright for all intake, stay upright for at least 30 mins after intake, total feed, 1:1 supervision with intake, general GERD precautions, STRICT aspiration precautions, assess oral cavity for pocketing, Control risk factors for aspiration PNA by completing oral care 3-4x/day and increasing physical mobility as is medically feasible, and hold PO and contact SLP if s/s of aspiration or worsening respiratory status develop. PATIENT NAME:  Gaby Dove      :  2000    Room: /6542-16   TODAY'S DATE:  10/31/2023    [x]The admitting diagnosis and active problem list, as listed below have been reviewed prior to initiation of this evaluation. 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 Sims   Radiologist: Dr. Tracey Hall       Reason For Assessment: to assess oropharyngeal swallow function, identify signs and symptoms of aspiration and make recommendations regarding safe dietary consistencies, effective compensatory strategies, and safe eating environment.     DATE ONSET: 10/29/2023      Date of Evaluation: 10/30/2023   Evaluating Therapist: Princess Walker, SLP administered. Esophageal Phase: esophageal phase of the swallow grossly functional from lateral view of cervical esophagus to oropharynx      LARYNGEAL PENETRATION/ASPIRATION   Laryngeal penetration present: [] Yes   [x] No    Aspiration present:  [] Yes   [x] No      Response to aspiration: N/A- no aspiration occurred          Aspiration Scale   [x]  1 Material does not enter the airway   []  2 Material enters the airway, remains above the vocal folds, and is ejected from the airway   []  3 Material enters the airway, remains above the vocal folds, and is not ejected from the airway   []  4 Material enters the airway, contacts the vocal folds, an is ejected from the airway   []  5 Material enters the airway, contacts the vocal folds, and is not ejected from the airway   []  6 Material enters the airway, passes below the vocal folds and is ejected into the larynx or out of the airway   []  7 Material enters the airway, passes below the vocal folds, and is not ejected from the trachea despite effort   []  8 Material enters the airway, passes below the vocal folds, and no effort is made to eject. Assessment: mild-moderate oropharyngeal dysphagia, likely acute-on-chronic related to reduced physical mobility, impaired cognition, and generalized weakness Swallow safety is preserved ; swallow efficiency is preserved  Pt appears to be at low risk for aspiration PNA, and moderate risk for malnutrition/dehydration. Diet modification is warranted. Swallow prognosis is good given lack of clinical s/s of aspiration at bedside and caregiver support. Pt appears to be a fair candidate for swallow rehabilitation    Diet Recommendation: IDDSI 4 Puree Solids ; IDDSI 0 Thin Liquids;  Meds crushed in puree as able  Risk Management: upright for all intake, stay upright for at least 30 mins after intake, total feed, 1:1 supervision with intake, general GERD precautions, STRICT aspiration precautions, assess oral cavity for

## 2023-10-31 NOTE — PROGRESS NOTES
Speech Language Pathology  Swallowing Disorders and Dysphagia    Dysphagia Treatment/Follow-Up Note  Facility/Department: [facility] PCU TELEMETRY    Diet recommendation: IDDSI 4 Puree Solids; IDDSI 0 Thin Liquids; Meds crushed in puree as able  Risk management: upright for all intake, stay upright for at least 30 mins after intake, small bites/sips, assist feed, 1:1 supervision with intake, oral care 2-3x/day to reduce adverse affects in the event of aspiration, STRICT aspiration precautions, and hold PO and contact SLP if s/s of aspiration or worsening respiratory status develop. NAME:Jose Leslie  : 2000 (20 y.o.)   MRN: [id number]  ROOM: [facility]  ADMISSION DATE: 10/29/2023  PATIENT DIAGNOSIS(ES): Seizure ([address]) [R56.9]  Fever, unspecified fever cause [R50.9]  No Known Allergies    DATE ONSET: 10/29/2023    Pain: The patient does not complain of pain       Current Diet: ADULT DIET; Dysphagia - Pureed  ADULT ORAL NUTRITION SUPPLEMENT; AM Snack, PM Snack, HS Snack; Standard 4 oz Oral Supplement    Diet Tolerance:  Per RN and caregiver report, patient tolerating current diet level without signs/symptoms of aspiration. Dysphagia Treatment and Impressions:  Subjective: Pt seen in room at bedside with RN permission  RN Report/Chart Review:  Mother at bedside - reports pt will only tolerate smooth liquids and will independently remove any \"chunks\" himself   Patient tolerance: No concerns with current diet tolerance     Respiratory Status: Did not test as session was spent on caregiver education and discussing pt's diet    Liquid PO Trials:   Did not test as pt was asleep during SLP visit and session was spent on caregiver education and discussing pt's diet and upcoming MBS   Solid PO Trials  Did not test as pt was asleep during SLP visit and session was spent on caregiver education and discussing pt's diet and upcoming MBS     Education: SLP edu pt re: Role of SLP, Rationale for dysphagia tx,

## 2023-10-31 NOTE — CARE COORDINATION
Patient possibly going home with IV antibiotics. Met with the mother of the patient. CM advised the patient may need to DC with IV antibiotics. Group home is unable to do IV antibiotics. Mother of patient advised the patient will DC to home with her. She will give the antibiotics. Mother refusing SNF.

## 2023-10-31 NOTE — PROGRESS NOTES
MD and this writer came to speak with mom about reasons for holding Depakote dose tonight. Reeducated.  Mother agrees to Lactulose and holding Depakote tonight with redraw of Depakote level in am.

## 2023-11-01 LAB
AMMONIA PLAS-SCNC: 89 UMOL/L (ref 16–60)
ANION GAP SERPL CALCULATED.3IONS-SCNC: 13 MMOL/L (ref 3–16)
BASOPHILS # BLD: 0 K/UL (ref 0–0.2)
BASOPHILS NFR BLD: 0 %
BUN SERPL-MCNC: 25 MG/DL (ref 7–20)
CALCIUM SERPL-MCNC: 10.6 MG/DL (ref 8.3–10.6)
CHLORIDE SERPL-SCNC: 110 MMOL/L (ref 99–110)
CO2 SERPL-SCNC: 17 MMOL/L (ref 21–32)
CREAT SERPL-MCNC: <0.5 MG/DL (ref 0.9–1.3)
DEPRECATED RDW RBC AUTO: 13.8 % (ref 12.4–15.4)
EOSINOPHIL # BLD: 0 K/UL (ref 0–0.6)
EOSINOPHIL NFR BLD: 0 %
GFR SERPLBLD CREATININE-BSD FMLA CKD-EPI: >60 ML/MIN/{1.73_M2}
GLUCOSE BLD-MCNC: 261 MG/DL (ref 70–99)
GLUCOSE SERPL-MCNC: 259 MG/DL (ref 70–99)
HCT VFR BLD AUTO: 39.9 % (ref 40.5–52.5)
HGB BLD-MCNC: 13.5 G/DL (ref 13.5–17.5)
LYMPHOCYTES # BLD: 0.3 K/UL (ref 1–5.1)
LYMPHOCYTES NFR BLD: 1.9 %
MCH RBC QN AUTO: 32.9 PG (ref 26–34)
MCHC RBC AUTO-ENTMCNC: 33.8 G/DL (ref 31–36)
MCV RBC AUTO: 97.5 FL (ref 80–100)
MONOCYTES # BLD: 0.7 K/UL (ref 0–1.3)
MONOCYTES NFR BLD: 4 %
NEUTROPHILS # BLD: 15.4 K/UL (ref 1.7–7.7)
NEUTROPHILS NFR BLD: 94.1 %
PERFORMED ON: ABNORMAL
PLATELET # BLD AUTO: 138 K/UL (ref 135–450)
PMV BLD AUTO: 8.6 FL (ref 5–10.5)
POTASSIUM SERPL-SCNC: 4.2 MMOL/L (ref 3.5–5.1)
RBC # BLD AUTO: 4.09 M/UL (ref 4.2–5.9)
S PNEUM AG UR QL: NORMAL
SODIUM SERPL-SCNC: 140 MMOL/L (ref 136–145)
WBC # BLD AUTO: 16.4 K/UL (ref 4–11)

## 2023-11-01 PROCEDURE — 2060000000 HC ICU INTERMEDIATE R&B

## 2023-11-01 PROCEDURE — 99233 SBSQ HOSP IP/OBS HIGH 50: CPT | Performed by: PSYCHIATRY & NEUROLOGY

## 2023-11-01 PROCEDURE — 82140 ASSAY OF AMMONIA: CPT

## 2023-11-01 PROCEDURE — 83036 HEMOGLOBIN GLYCOSYLATED A1C: CPT

## 2023-11-01 PROCEDURE — 36415 COLL VENOUS BLD VENIPUNCTURE: CPT

## 2023-11-01 PROCEDURE — 99233 SBSQ HOSP IP/OBS HIGH 50: CPT | Performed by: INTERNAL MEDICINE

## 2023-11-01 PROCEDURE — 6370000000 HC RX 637 (ALT 250 FOR IP): Performed by: PSYCHIATRY & NEUROLOGY

## 2023-11-01 PROCEDURE — 6370000000 HC RX 637 (ALT 250 FOR IP): Performed by: INTERNAL MEDICINE

## 2023-11-01 PROCEDURE — 80048 BASIC METABOLIC PNL TOTAL CA: CPT

## 2023-11-01 PROCEDURE — 6370000000 HC RX 637 (ALT 250 FOR IP): Performed by: STUDENT IN AN ORGANIZED HEALTH CARE EDUCATION/TRAINING PROGRAM

## 2023-11-01 PROCEDURE — 85025 COMPLETE CBC W/AUTO DIFF WBC: CPT

## 2023-11-01 RX ORDER — AMOXICILLIN AND CLAVULANATE POTASSIUM 250; 62.5 MG/5ML; MG/5ML
500 POWDER, FOR SUSPENSION ORAL EVERY 8 HOURS
Status: DISCONTINUED | OUTPATIENT
Start: 2023-11-01 | End: 2023-11-02 | Stop reason: HOSPADM

## 2023-11-01 RX ORDER — GUAIFENESIN/DEXTROMETHORPHAN 100-10MG/5
5 SYRUP ORAL EVERY 6 HOURS
Status: DISCONTINUED | OUTPATIENT
Start: 2023-11-01 | End: 2023-11-02 | Stop reason: HOSPADM

## 2023-11-01 RX ORDER — DOXYCYCLINE HYCLATE 100 MG
50 TABLET ORAL EVERY 12 HOURS SCHEDULED
Status: DISCONTINUED | OUTPATIENT
Start: 2023-11-01 | End: 2023-11-02 | Stop reason: HOSPADM

## 2023-11-01 RX ORDER — LEVETIRACETAM 100 MG/ML
1000 SOLUTION ORAL 2 TIMES DAILY
Status: DISCONTINUED | OUTPATIENT
Start: 2023-11-01 | End: 2023-11-02 | Stop reason: HOSPADM

## 2023-11-01 RX ORDER — DIVALPROEX SODIUM 125 MG/1
250 CAPSULE, COATED PELLETS ORAL EVERY 12 HOURS SCHEDULED
Status: DISCONTINUED | OUTPATIENT
Start: 2023-11-01 | End: 2023-11-02 | Stop reason: HOSPADM

## 2023-11-01 RX ADMIN — DIVALPROEX SODIUM 500 MG: 125 CAPSULE, COATED PELLETS ORAL at 08:25

## 2023-11-01 RX ADMIN — Medication 1000 MG: at 20:10

## 2023-11-01 RX ADMIN — DIVALPROEX SODIUM 250 MG: 125 CAPSULE, COATED PELLETS ORAL at 20:11

## 2023-11-01 RX ADMIN — AMOXICILLIN AND CLAVULANATE POTASSIUM 500 MG: 250; 62.5 POWDER, FOR SUSPENSION ORAL at 16:11

## 2023-11-01 RX ADMIN — LACTULOSE 20 G: 10 SOLUTION ORAL at 16:11

## 2023-11-01 RX ADMIN — DOXYCYCLINE HYCLATE 50 MG: 100 TABLET, COATED ORAL at 20:10

## 2023-11-01 RX ADMIN — Medication 800 MG: at 08:25

## 2023-11-01 RX ADMIN — GUAIFENESIN AND DEXTROMETHORPHAN 5 ML: 100; 10 SYRUP ORAL at 20:10

## 2023-11-01 RX ADMIN — AMOXICILLIN AND CLAVULANATE POTASSIUM 500 MG: 250; 62.5 POWDER, FOR SUSPENSION ORAL at 22:02

## 2023-11-01 RX ADMIN — GUAIFENESIN AND DEXTROMETHORPHAN 5 ML: 100; 10 SYRUP ORAL at 16:10

## 2023-11-01 NOTE — PROGRESS NOTES
Patient w/ no BM as of yet today  -  lactulose given. Also oral antibiotics initiated.   Appears comfortable, mom at bedside

## 2023-11-01 NOTE — PROGRESS NOTES
IV line infiltrated, removed and dressing applied. Informed charge nurse since patient's mom told this writer that he is a hard stick and that they used ultrasound on the last IV access. CN informed clinical Walter Reason and suggested to have a PICC - mom refused. Charge nurse explained the pros of having the PICC.  Mom said that she will wait for the morning doctor if they would allow to have him on oral.

## 2023-11-01 NOTE — PLAN OF CARE
Problem: Discharge Planning  Goal: Discharge to home or other facility with appropriate resources  Outcome: Progressing     Problem: Pain  Goal: Verbalizes/displays adequate comfort level or baseline comfort level  Outcome: Progressing     Problem: Safety - Adult  Goal: Free from fall injury  Outcome: Progressing     Problem: Skin/Tissue Integrity  Goal: Absence of new skin breakdown  Outcome: Progressing     Problem: Neurosensory - Adult  Goal: Absence of seizures  Outcome: Progressing

## 2023-11-01 NOTE — PROGRESS NOTES
End of shift report given to Terrell Piña    care transferred.   Patient resting in bed, family at bedside,

## 2023-11-01 NOTE — PLAN OF CARE
Problem: Neurosensory - Adult  Goal: Absence of seizures  11/1/2023 0013 by Zhanna Eduardo RN  Outcome: Progressing  Flowsheets (Taken 11/1/2023 0013)  Absence of seizures: Monitor for seizure activity. If seizure occurs, document type and location of movements and any associated apnea  10/31/2023 2037 by Crissy Trevino RN  Outcome: Progressing     Problem: Skin/Tissue Integrity  Goal: Absence of new skin breakdown  Description: 1. Monitor for areas of redness and/or skin breakdown  2. Assess vascular access sites hourly  3. Every 4-6 hours minimum:  Change oxygen saturation probe site  4. Every 4-6 hours:  If on nasal continuous positive airway pressure, respiratory therapy assess nares and determine need for appliance change or resting period.   11/1/2023 0013 by Zhanna Eduardo RN  Outcome: Progressing  10/31/2023 2037 by Crissy Trevino RN  Outcome: Progressing     Problem: Safety - Adult  Goal: Free from fall injury  11/1/2023 0013 by Zhanna Eduardo RN  Outcome: Progressing  Flowsheets (Taken 11/1/2023 0013)  Free From Fall Injury: Instruct family/caregiver on patient safety  10/31/2023 2037 by Crissy Trevino RN  Outcome: Progressing     Problem: Discharge Planning  Goal: Discharge to home or other facility with appropriate resources  11/1/2023 0013 by Zhanna Eduardo RN  Outcome: Progressing  Flowsheets (Taken 11/1/2023 0013)  Discharge to home or other facility with appropriate resources: Identify barriers to discharge with patient and caregiver  10/31/2023 2037 by Crissy Trevino RN  Outcome: Progressing

## 2023-11-01 NOTE — PROGRESS NOTES
End of shift report given to [facility]  -  care transferred. Patient resting in bed. Telesitter in place.

## 2023-11-01 NOTE — CARE COORDINATION
Per Dr. Tiffany Bosch the patient should be switching to PO antibiotics. If on PO antibiotics will discharge to [facility].

## 2023-11-01 NOTE — PROGRESS NOTES
Patient assessment as noted per flowsheet  -  being seen/evaluated by neurology at present. Patient requesting no IV until speaking w/ hospitalist.  Also wanting to hold off on lactulose for now to see if he has BM without it. Per Dr. Solitario Jean, patient should be good with just 1 good BM/day.

## 2023-11-02 VITALS
DIASTOLIC BLOOD PRESSURE: 79 MMHG | RESPIRATION RATE: 16 BRPM | TEMPERATURE: 97.4 F | HEIGHT: 60 IN | OXYGEN SATURATION: 93 % | HEART RATE: 80 BPM | WEIGHT: 88.1 LBS | SYSTOLIC BLOOD PRESSURE: 126 MMHG | BODY MASS INDEX: 17.3 KG/M2

## 2023-11-02 LAB
AMMONIA PLAS-SCNC: 41 UMOL/L (ref 16–60)
ANION GAP SERPL CALCULATED.3IONS-SCNC: 9 MMOL/L (ref 3–16)
BASOPHILS # BLD: 0 K/UL (ref 0–0.2)
BASOPHILS NFR BLD: 0.1 %
BUN SERPL-MCNC: 22 MG/DL (ref 7–20)
CALCIUM SERPL-MCNC: 10.3 MG/DL (ref 8.3–10.6)
CHLORIDE SERPL-SCNC: 107 MMOL/L (ref 99–110)
CO2 SERPL-SCNC: 21 MMOL/L (ref 21–32)
CREAT SERPL-MCNC: <0.5 MG/DL (ref 0.9–1.3)
DEPRECATED RDW RBC AUTO: 13.6 % (ref 12.4–15.4)
EOSINOPHIL # BLD: 0 K/UL (ref 0–0.6)
EOSINOPHIL NFR BLD: 0.1 %
EST. AVERAGE GLUCOSE BLD GHB EST-MCNC: 96.8 MG/DL
GFR SERPLBLD CREATININE-BSD FMLA CKD-EPI: >60 ML/MIN/{1.73_M2}
GLUCOSE BLD-MCNC: 183 MG/DL (ref 70–99)
GLUCOSE SERPL-MCNC: 117 MG/DL (ref 70–99)
HBA1C MFR BLD: 5 %
HCT VFR BLD AUTO: 41.9 % (ref 40.5–52.5)
HGB BLD-MCNC: 14.1 G/DL (ref 13.5–17.5)
LYMPHOCYTES # BLD: 1.1 K/UL (ref 1–5.1)
LYMPHOCYTES NFR BLD: 11 %
MCH RBC QN AUTO: 33.1 PG (ref 26–34)
MCHC RBC AUTO-ENTMCNC: 33.6 G/DL (ref 31–36)
MCV RBC AUTO: 98.3 FL (ref 80–100)
MONOCYTES # BLD: 1 K/UL (ref 0–1.3)
MONOCYTES NFR BLD: 10 %
NEUTROPHILS # BLD: 8.3 K/UL (ref 1.7–7.7)
NEUTROPHILS NFR BLD: 78.8 %
PERFORMED ON: ABNORMAL
PLATELET # BLD AUTO: 144 K/UL (ref 135–450)
PMV BLD AUTO: 8.4 FL (ref 5–10.5)
POTASSIUM SERPL-SCNC: 4.6 MMOL/L (ref 3.5–5.1)
RBC # BLD AUTO: 4.27 M/UL (ref 4.2–5.9)
SODIUM SERPL-SCNC: 137 MMOL/L (ref 136–145)
WBC # BLD AUTO: 10.5 K/UL (ref 4–11)

## 2023-11-02 PROCEDURE — 80048 BASIC METABOLIC PNL TOTAL CA: CPT

## 2023-11-02 PROCEDURE — 99238 HOSP IP/OBS DSCHRG MGMT 30/<: CPT | Performed by: INTERNAL MEDICINE

## 2023-11-02 PROCEDURE — 6370000000 HC RX 637 (ALT 250 FOR IP): Performed by: PSYCHIATRY & NEUROLOGY

## 2023-11-02 PROCEDURE — 85025 COMPLETE CBC W/AUTO DIFF WBC: CPT

## 2023-11-02 PROCEDURE — 36415 COLL VENOUS BLD VENIPUNCTURE: CPT

## 2023-11-02 PROCEDURE — 99233 SBSQ HOSP IP/OBS HIGH 50: CPT | Performed by: PSYCHIATRY & NEUROLOGY

## 2023-11-02 PROCEDURE — 6370000000 HC RX 637 (ALT 250 FOR IP): Performed by: INTERNAL MEDICINE

## 2023-11-02 PROCEDURE — 82140 ASSAY OF AMMONIA: CPT

## 2023-11-02 RX ORDER — AMOXICILLIN AND CLAVULANATE POTASSIUM 250; 62.5 MG/5ML; MG/5ML
500 POWDER, FOR SUSPENSION ORAL EVERY 8 HOURS
Qty: 150 ML | Refills: 0 | Status: SHIPPED | OUTPATIENT
Start: 2023-11-02 | End: 2023-11-07

## 2023-11-02 RX ORDER — PANTOPRAZOLE SODIUM 20 MG/1
20 TABLET, DELAYED RELEASE ORAL DAILY
Qty: 30 TABLET | Refills: 0 | Status: SHIPPED | OUTPATIENT
Start: 2023-11-02 | End: 2023-12-02

## 2023-11-02 RX ORDER — DOXYCYCLINE HYCLATE 50 MG/1
50 TABLET, FILM COATED ORAL EVERY 12 HOURS SCHEDULED
Qty: 11 TABLET | Refills: 0 | Status: SHIPPED | OUTPATIENT
Start: 2023-11-02 | End: 2023-11-08

## 2023-11-02 RX ORDER — LACTULOSE 10 G/15ML
10 SOLUTION ORAL DAILY PRN
Qty: 237 ML | Refills: 0 | Status: SHIPPED | OUTPATIENT
Start: 2023-11-02 | End: 2023-12-02

## 2023-11-02 RX ORDER — DIVALPROEX SODIUM 125 MG/1
250 CAPSULE, COATED PELLETS ORAL EVERY 12 HOURS SCHEDULED
Qty: 120 CAPSULE | Refills: 0 | Status: SHIPPED | OUTPATIENT
Start: 2023-11-02 | End: 2023-12-02

## 2023-11-02 RX ORDER — GUAIFENESIN/DEXTROMETHORPHAN 100-10MG/5
5 SYRUP ORAL EVERY 6 HOURS
Qty: 120 ML | COMMUNITY
Start: 2023-11-02 | End: 2023-11-12

## 2023-11-02 RX ORDER — LEVETIRACETAM 100 MG/ML
1000 SOLUTION ORAL 2 TIMES DAILY
Qty: 600 ML | Refills: 0 | Status: SHIPPED | OUTPATIENT
Start: 2023-11-02 | End: 2023-12-02

## 2023-11-02 RX ADMIN — AMOXICILLIN AND CLAVULANATE POTASSIUM 500 MG: 250; 62.5 POWDER, FOR SUSPENSION ORAL at 05:41

## 2023-11-02 RX ADMIN — DIVALPROEX SODIUM 250 MG: 125 CAPSULE, COATED PELLETS ORAL at 09:40

## 2023-11-02 RX ADMIN — GUAIFENESIN AND DEXTROMETHORPHAN 5 ML: 100; 10 SYRUP ORAL at 01:57

## 2023-11-02 RX ADMIN — Medication 1000 MG: at 09:48

## 2023-11-02 RX ADMIN — GUAIFENESIN AND DEXTROMETHORPHAN 5 ML: 100; 10 SYRUP ORAL at 09:42

## 2023-11-02 NOTE — PLAN OF CARE
Problem: Safety - Adult  Goal: Free from fall injury  11/2/2023 0051 by Saint Sale, RN  Outcome: Progressing  Flowsheets (Taken 11/2/2023 0051)  Free From Fall Injury: Instruct family/caregiver on patient safety  11/1/2023 1640 by Tyrone Joe RN  Outcome: Progressing     Problem: Skin/Tissue Integrity  Goal: Absence of new skin breakdown  Description: 1. Monitor for areas of redness and/or skin breakdown  2. Assess vascular access sites hourly  3. Every 4-6 hours minimum:  Change oxygen saturation probe site  4. Every 4-6 hours:  If on nasal continuous positive airway pressure, respiratory therapy assess nares and determine need for appliance change or resting period. 11/2/2023 0051 by Saint Sale, RN  Outcome: Progressing  11/1/2023 1640 by Tyrone Joe RN  Outcome: Progressing     Problem: Neurosensory - Adult  Goal: Absence of seizures  11/2/2023 0051 by Saint Sale, RN  Outcome: Progressing  Flowsheets (Taken 11/2/2023 0051)  Absence of seizures:   Monitor for seizure activity.   If seizure occurs, document type and location of movements and any associated apnea   If seizure occurs, turn head to side and suction secretions as needed  11/1/2023 1640 by Tyrone Joe RN  Outcome: Progressing     Problem: Discharge Planning  Goal: Discharge to home or other facility with appropriate resources  11/1/2023 1640 by Tyrone Joe RN  Outcome: Progressing

## 2023-11-02 NOTE — CARE COORDINATION
DISCHARGE ORDER  Date/Time 2023 11:36 AM  Completed by: Jennifer Buckner, Case Management    Patient Name: Vin Garay      : 2000  Admitting Diagnosis: Seizure ([address]) [R56.9]  Fever, unspecified fever cause [R50.9]      Admit order Date and Status: 10/30/23 stable  (verify MD's last order for status of admission)      Noted discharge order. If applicable PT/OT recommendation at Discharge: NA    Confirmed discharge plan: Yes  with whom_Ruthann___________  If pt confirmed DC plan does family need to be contacted by CM No     Discharge Plan: Reviewed chart, noted DC order, met with pt at bedside. Pt to be DC home to [facility] today, [place] providing transportation. Denies and HC/DME at DC. Date of Last IMM Given: 2023    Reviewed chart. Role of discharge planner explained and patient verbalized understanding. Discharge order is noted. Has Home O2 in place on admit:  No  Informed of need to bring portable home O2 tank on day of discharge for nursing to connect prior to leaving:   Not Indicated  Verbalized agreement/Understanding:   Not Indicated    Pt is being d/c'd to [facility] today. Pt's O2 sats are 93% on RA. Discharge timeout done with Pt, [place] mother, Adela Ace at [address] home. All discharge needs and concerns addressed.

## 2023-11-02 NOTE — PLAN OF CARE
Problem: Discharge Planning  Goal: Discharge to home or other facility with appropriate resources  Outcome: Progressing  Flowsheets (Taken 11/2/2023 1128)  Discharge to home or other facility with appropriate resources:   Identify barriers to discharge with patient and caregiver   Identify discharge learning needs (meds, wound care, etc)     Problem: Pain  Goal: Verbalizes/displays adequate comfort level or baseline comfort level  Outcome: Progressing  Flowsheets (Taken 11/2/2023 1128)  Verbalizes/displays adequate comfort level or baseline comfort level:   Assess pain using appropriate pain scale   Implement non-pharmacological measures as appropriate and evaluate response   Notify Licensed Independent Practitioner if interventions unsuccessful or patient reports new pain     Problem: Safety - Adult  Goal: Free from fall injury  11/2/2023 1128 by Milka Bauer RN  Outcome: Ericka Weaver (Taken 11/2/2023 1128)  Free From Fall Injury:   Instruct family/caregiver on patient safety   Based on caregiver fall risk screen, instruct family/caregiver to ask for assistance with transferring infant if caregiver noted to have fall risk factors  11/2/2023 0051 by Zhanna Eduardo RN  Outcome: Progressing  Flowsheets (Taken 11/2/2023 0051)  Free From Fall Injury: Instruct family/caregiver on patient safety     Problem: Skin/Tissue Integrity  Goal: Absence of new skin breakdown  Description: 1. Monitor for areas of redness and/or skin breakdown  2. Assess vascular access sites hourly  3. Every 4-6 hours minimum:  Change oxygen saturation probe site  4. Every 4-6 hours:  If on nasal continuous positive airway pressure, respiratory therapy assess nares and determine need for appliance change or resting period.   11/2/2023 1128 by Milka Bauer RN  Outcome: Progressing  11/2/2023 0051 by Zhanna Eduardo RN  Outcome: Progressing     Problem: Neurosensory - Adult  Goal: Absence of

## 2023-11-02 NOTE — PROGRESS NOTES
Patient educated on discharge instructions as well as new medications use, dosage, administration and possible side effects. Patient verified knowledge. IV removed without difficulty and dry dressing in place. Telemetry monitor removed and returned to [facility]. Pt left facility in stable condition to Nursing Home with all of their personal belongings.

## 2023-11-02 NOTE — PLAN OF CARE
Problem: Discharge Planning  Goal: Discharge to home or other facility with appropriate resources  11/2/2023 1358 by Helene Long RN  Outcome: Completed  11/2/2023 1128 by Helene Long RN  Outcome: Progressing  Flowsheets (Taken 11/2/2023 1128)  Discharge to home or other facility with appropriate resources:   Identify barriers to discharge with patient and caregiver   Identify discharge learning needs (meds, wound care, etc)     Problem: Pain  Goal: Verbalizes/displays adequate comfort level or baseline comfort level  11/2/2023 1358 by Helene Long RN  Outcome: Completed  11/2/2023 1128 by Helene Long RN  Outcome: Progressing  Flowsheets (Taken 11/2/2023 1128)  Verbalizes/displays adequate comfort level or baseline comfort level:   Assess pain using appropriate pain scale   Implement non-pharmacological measures as appropriate and evaluate response   Notify Licensed Independent Practitioner if interventions unsuccessful or patient reports new pain     Problem: Safety - Adult  Goal: Free from fall injury  11/2/2023 1358 by Helene Long RN  Outcome: Completed  11/2/2023 1128 by Helene Long RN  Outcome: Progressing  Flowsheets (Taken 11/2/2023 1128)  Free From Fall Injury:   Instruct family/caregiver on patient safety   Based on caregiver fall risk screen, instruct family/caregiver to ask for assistance with transferring infant if caregiver noted to have fall risk factors  11/2/2023 0051 by Violet Funez RN  Outcome: Progressing  Flowsheets (Taken 11/2/2023 0051)  Free From Fall Injury: Instruct family/caregiver on patient safety     Problem: Skin/Tissue Integrity  Goal: Absence of new skin breakdown  Description: 1. Monitor for areas of redness and/or skin breakdown  2. Assess vascular access sites hourly  3. Every 4-6 hours minimum:  Change oxygen saturation probe site  4.   Every 4-6 hours:  If on nasal continuous positive airway pressure, respiratory therapy

## 2023-11-02 NOTE — DISCHARGE INSTRUCTIONS
Please follow-up with neurology for continued Depakote weaning and with primary care for an after hospitalization check-up.

## 2023-11-02 NOTE — DISCHARGE SUMMARY
Hospital Medicine Discharge Summary    Patient: Miguel Gutierrez     Gender: male  : 2000   Age: 20 y.o. MRN: [id number]    Admitting Physician: Earnest Villegas DO  Discharge Physician: Earnest Villegas DO    Code Status: Full Code     Admit Date: 10/29/2023   Discharge Date: 2023      Discharge Diagnoses: Active Hospital Problems    Diagnosis Date Noted    Seizure ([address]) [R56.9] 10/30/2023    Fever [R50.9] 10/30/2023    Sepsis ([address]) [A41.9] 10/30/2023    Developmental delay [R62.50] 10/30/2023    Hx of myocarditis [Z86.79] 10/30/2023       Condition at Discharge: Stable    Hospital Course: The patient is a 20 y.o. male with pmhx of seizure disorder, developmental delay who presented to [facility] ED with concern for seizure. Patient is non-verbal group home resident. Caregiver was concerned for seizure like activity last week and then again yesterday. Mom at bedside reports patient has not had seizure in 5 years. He is on depakote and keppra and has not missed any doses reportedly. #Seizure disorder  Elevated Ammonia level  -has reportedly not had a major seizure in 5 years however whenever he has an infection he has had short staring spells and then will almost seem to stop breathing and lips may turn blue briefly as well. Mom says these have been much more common for him and have not previously warranted a change in therapy.   -CT head negative for acute pathology   -on depakote and keppra   -seizure precautions   -EEG pending but Mom did not allow, patient however does seem to be rapidly improving and bases on description sounds like a common spell for him when he is sick, continue to monitor  -depakote level elevated, trending down, ammonia level elevated, trending down - no longer elevated  -neurology consulted   -started on lactulose, depakote restarted lower dose and keppra also increased  -plan is for further depakote weaning outpatient  -needs to have at least one bowel movement

## 2023-11-03 LAB
BACTERIA BLD CULT ORG #2: NORMAL
BACTERIA BLD CULT: NORMAL

## 2023-12-27 ENCOUNTER — APPOINTMENT (OUTPATIENT)
Dept: GENERAL RADIOLOGY | Age: 23
End: 2023-12-27
Payer: MEDICARE

## 2023-12-27 ENCOUNTER — HOSPITAL ENCOUNTER (EMERGENCY)
Age: 23
Discharge: HOME | End: 2023-12-27
Payer: MEDICARE

## 2023-12-27 VITALS
SYSTOLIC BLOOD PRESSURE: 97 MMHG | HEART RATE: 116 BPM | TEMPERATURE: 97.6 F | DIASTOLIC BLOOD PRESSURE: 84 MMHG | RESPIRATION RATE: 39 BRPM | OXYGEN SATURATION: 95 %

## 2023-12-27 DIAGNOSIS — U07.1 COVID-19: Primary | ICD-10-CM

## 2023-12-27 LAB
ALBUMIN SERPL-MCNC: 4.6 G/DL (ref 3.4–5)
ALBUMIN/GLOB SERPL: 1.3 {RATIO} (ref 1.1–2.2)
ALP SERPL-CCNC: 66 U/L (ref 40–129)
ALT SERPL-CCNC: 15 U/L (ref 10–40)
ANION GAP SERPL CALCULATED.3IONS-SCNC: 13 MMOL/L (ref 3–16)
AST SERPL-CCNC: 26 U/L (ref 15–37)
BASE EXCESS BLDV CALC-SCNC: -1.1 MMOL/L (ref -3–3)
BASOPHILS # BLD: 0 K/UL (ref 0–0.2)
BASOPHILS NFR BLD: 0.3 %
BILIRUB SERPL-MCNC: 0.4 MG/DL (ref 0–1)
BUN SERPL-MCNC: 23 MG/DL (ref 7–20)
CALCIUM SERPL-MCNC: 10 MG/DL (ref 8.3–10.6)
CHLORIDE SERPL-SCNC: 105 MMOL/L (ref 99–110)
CO2 BLDV-SCNC: 23 MMOL/L
CO2 SERPL-SCNC: 23 MMOL/L (ref 21–32)
COHGB MFR BLDV: 0.9 % (ref 0–1.5)
CREAT SERPL-MCNC: <0.5 MG/DL (ref 0.9–1.3)
DEPRECATED RDW RBC AUTO: 12.9 % (ref 12.4–15.4)
EOSINOPHIL # BLD: 0 K/UL (ref 0–0.6)
EOSINOPHIL NFR BLD: 0 %
FLUAV RNA RESP QL NAA+PROBE: NOT DETECTED
FLUBV RNA RESP QL NAA+PROBE: NOT DETECTED
GFR SERPLBLD CREATININE-BSD FMLA CKD-EPI: >60 ML/MIN/{1.73_M2}
GLUCOSE SERPL-MCNC: 93 MG/DL (ref 70–99)
HCO3 BLDV-SCNC: 21.7 MMOL/L (ref 23–29)
HCT VFR BLD AUTO: 41.6 % (ref 40.5–52.5)
HGB BLD-MCNC: 14.3 G/DL (ref 13.5–17.5)
LACTATE BLDV-SCNC: 1.1 MMOL/L (ref 0.4–1.9)
LYMPHOCYTES # BLD: 1 K/UL (ref 1–5.1)
LYMPHOCYTES NFR BLD: 14.8 %
MCH RBC QN AUTO: 32 PG (ref 26–34)
MCHC RBC AUTO-ENTMCNC: 34.4 G/DL (ref 31–36)
MCV RBC AUTO: 92.9 FL (ref 80–100)
METHGB MFR BLDV: 0.1 %
MONOCYTES # BLD: 1.5 K/UL (ref 0–1.3)
MONOCYTES NFR BLD: 21.2 %
NEUTROPHILS # BLD: 4.4 K/UL (ref 1.7–7.7)
NEUTROPHILS NFR BLD: 63.7 %
O2 CT VFR BLDV CALC: 21 VOL %
O2 THERAPY: ABNORMAL
PCO2 BLDV: 31.5 MMHG (ref 40–50)
PH BLDV: 7.46 [PH] (ref 7.35–7.45)
PLATELET # BLD AUTO: 132 K/UL (ref 135–450)
PMV BLD AUTO: 9.7 FL (ref 5–10.5)
PO2 BLDV: 93.3 MMHG (ref 25–40)
POTASSIUM SERPL-SCNC: 4.3 MMOL/L (ref 3.5–5.1)
PROCALCITONIN SERPL IA-MCNC: 0.1 NG/ML (ref 0–0.15)
PROT SERPL-MCNC: 8.1 G/DL (ref 6.4–8.2)
RBC # BLD AUTO: 4.47 M/UL (ref 4.2–5.9)
SAO2 % BLDV: 98 %
SARS-COV-2 RNA RESP QL NAA+PROBE: DETECTED
SODIUM SERPL-SCNC: 141 MMOL/L (ref 136–145)
VALPROATE SERPL-MCNC: 51.8 UG/ML (ref 50–100)
WBC # BLD AUTO: 6.8 K/UL (ref 4–11)

## 2023-12-27 PROCEDURE — 80177 DRUG SCRN QUAN LEVETIRACETAM: CPT

## 2023-12-27 PROCEDURE — 85025 COMPLETE CBC W/AUTO DIFF WBC: CPT

## 2023-12-27 PROCEDURE — 83605 ASSAY OF LACTIC ACID: CPT

## 2023-12-27 PROCEDURE — 80164 ASSAY DIPROPYLACETIC ACD TOT: CPT

## 2023-12-27 PROCEDURE — 6370000000 HC RX 637 (ALT 250 FOR IP): Performed by: PHYSICIAN ASSISTANT

## 2023-12-27 PROCEDURE — 36415 COLL VENOUS BLD VENIPUNCTURE: CPT

## 2023-12-27 PROCEDURE — 82803 BLOOD GASES ANY COMBINATION: CPT

## 2023-12-27 PROCEDURE — 71045 X-RAY EXAM CHEST 1 VIEW: CPT

## 2023-12-27 PROCEDURE — 87636 SARSCOV2 & INF A&B AMP PRB: CPT

## 2023-12-27 PROCEDURE — 99284 EMERGENCY DEPT VISIT MOD MDM: CPT

## 2023-12-27 PROCEDURE — 87040 BLOOD CULTURE FOR BACTERIA: CPT

## 2023-12-27 PROCEDURE — 80053 COMPREHEN METABOLIC PANEL: CPT

## 2023-12-27 PROCEDURE — 84145 PROCALCITONIN (PCT): CPT

## 2023-12-27 RX ORDER — LEVETIRACETAM 100 MG/ML
1000 SOLUTION ORAL ONCE
Status: COMPLETED | OUTPATIENT
Start: 2023-12-27 | End: 2023-12-27

## 2023-12-27 RX ORDER — 0.9 % SODIUM CHLORIDE 0.9 %
500 INTRAVENOUS SOLUTION INTRAVENOUS ONCE
Status: DISCONTINUED | OUTPATIENT
Start: 2023-12-27 | End: 2023-12-27

## 2023-12-27 RX ORDER — GUAIFENESIN 200 MG/10ML
200 LIQUID ORAL ONCE
Status: DISCONTINUED | OUTPATIENT
Start: 2023-12-27 | End: 2023-12-27 | Stop reason: HOSPADM

## 2023-12-27 RX ORDER — GUAIFENESIN/DEXTROMETHORPHAN 100-10MG/5
5 SYRUP ORAL 3 TIMES DAILY PRN
Qty: 120 ML | Refills: 0 | Status: SHIPPED | OUTPATIENT
Start: 2023-12-27 | End: 2024-01-06

## 2023-12-27 RX ORDER — DIVALPROEX SODIUM 125 MG/1
250 CAPSULE, COATED PELLETS ORAL ONCE
Status: COMPLETED | OUTPATIENT
Start: 2023-12-27 | End: 2023-12-27

## 2023-12-27 RX ORDER — DEXTROMETHORPHAN HBR. AND GUAIFENESIN 10; 100 MG/5ML; MG/5ML
10 SOLUTION ORAL EVERY 6 HOURS PRN
Qty: 236 ML | Refills: 0 | Status: SHIPPED | OUTPATIENT
Start: 2023-12-27 | End: 2024-01-06

## 2023-12-27 RX ADMIN — Medication 1000 MG: at 18:53

## 2023-12-27 RX ADMIN — DIVALPROEX SODIUM 250 MG: 125 CAPSULE, COATED PELLETS ORAL at 18:53

## 2023-12-27 NOTE — ED PROVIDER NOTES
Medication List as of 12/27/2023  8:24 PM        START taking these medications    Details   dextromethorphan-guaiFENesin (ROBITUSSIN-DM)  MG/5ML syrup Take 10 mLs by mouth every 6 hours as needed for Cough (cough and shortness of breath), Disp-236 mL, R-0Print             DISCONTINUED MEDICATIONS:  Discharge Medication List as of 12/27/2023  8:24 PM                 (Please note that portions of this note were completed with a voice recognition program.  Efforts were made to edit the dictations but occasionally words are mis-transcribed.)    Yennifer Medina PA-C (electronically signed)            Yennifer Medina PA-C  12/27/23 5291

## 2023-12-28 LAB
LEVETIRACETAM SERPL-MCNC: 29.7 UG/ML (ref 6–46)
MEDICATION DOSE-MCNC: NORMAL

## 2023-12-28 NOTE — DISCHARGE INSTRUCTIONS
Positive COVID test and which is consistent with patient not feeling well for the next several days. No evidence of pneumonia is seen on chest xray, no evidence of sepsis. Encourage adequate fluid and Tylenol/Motrin for fever control. Return or take patient to [facility] for further evaluation if there are new or worsening of symptoms.

## 2023-12-31 LAB
BACTERIA BLD CULT ORG #2: NORMAL
BACTERIA BLD CULT: NORMAL

## 2024-01-05 ENCOUNTER — APPOINTMENT (OUTPATIENT)
Dept: CT IMAGING | Age: 24
End: 2024-01-05
Payer: MEDICARE

## 2024-01-05 ENCOUNTER — APPOINTMENT (OUTPATIENT)
Dept: GENERAL RADIOLOGY | Age: 24
End: 2024-01-05
Payer: MEDICARE

## 2024-01-05 ENCOUNTER — HOSPITAL ENCOUNTER (INPATIENT)
Age: 24
LOS: 8 days | Discharge: HOME | End: 2024-01-13
Attending: INTERNAL MEDICINE | Admitting: INTERNAL MEDICINE
Payer: MEDICARE

## 2024-01-05 ENCOUNTER — HOSPITAL ENCOUNTER (EMERGENCY)
Age: 24
Discharge: ANOTHER HOSPITAL | End: 2024-01-05
Attending: EMERGENCY MEDICINE
Payer: MEDICARE

## 2024-01-05 ENCOUNTER — APPOINTMENT (OUTPATIENT)
Dept: GENERAL RADIOLOGY | Age: 24
End: 2024-01-05
Attending: INTERNAL MEDICINE
Payer: MEDICARE

## 2024-01-05 VITALS
WEIGHT: 96 LBS | BODY MASS INDEX: 24.03 KG/M2 | SYSTOLIC BLOOD PRESSURE: 108 MMHG | DIASTOLIC BLOOD PRESSURE: 74 MMHG | HEART RATE: 108 BPM | OXYGEN SATURATION: 100 % | RESPIRATION RATE: 21 BRPM | TEMPERATURE: 99.3 F

## 2024-01-05 DIAGNOSIS — G40.901 STATUS EPILEPTICUS (HCC): ICD-10-CM

## 2024-01-05 DIAGNOSIS — G40.919 BREAKTHROUGH SEIZURE (HCC): Primary | ICD-10-CM

## 2024-01-05 PROBLEM — T17.500A MUCOID IMPACTION OF BRONCHI: Status: ACTIVE | Noted: 2024-01-05

## 2024-01-05 PROBLEM — J96.01 ACUTE RESPIRATORY FAILURE WITH HYPOXIA (HCC): Status: ACTIVE | Noted: 2024-01-05

## 2024-01-05 PROBLEM — J20.9 ACUTE BRONCHITIS: Status: ACTIVE | Noted: 2024-01-05

## 2024-01-05 PROBLEM — J98.11 ATELECTASIS OF LEFT LUNG: Status: ACTIVE | Noted: 2024-01-05

## 2024-01-05 LAB
ALBUMIN SERPL-MCNC: 4.2 G/DL (ref 3.4–5)
ALBUMIN/GLOB SERPL: 1.5 {RATIO} (ref 1.1–2.2)
ALP SERPL-CCNC: 82 U/L (ref 40–129)
ALT SERPL-CCNC: 21 U/L (ref 10–40)
AMMONIA PLAS-SCNC: 48 UMOL/L (ref 16–60)
AMPHETAMINES UR QL SCN>1000 NG/ML: ABNORMAL
ANION GAP SERPL CALCULATED.3IONS-SCNC: 12 MMOL/L (ref 3–16)
AST SERPL-CCNC: 38 U/L (ref 15–37)
BARBITURATES UR QL SCN>200 NG/ML: ABNORMAL
BASE EXCESS BLDV CALC-SCNC: -5.6 MMOL/L (ref -3–3)
BASE EXCESS BLDV CALC-SCNC: -6.9 MMOL/L (ref -3–3)
BASOPHILS # BLD: 0 K/UL (ref 0–0.2)
BASOPHILS NFR BLD: 0 %
BENZODIAZ UR QL SCN>200 NG/ML: POSITIVE
BILIRUB SERPL-MCNC: <0.2 MG/DL (ref 0–1)
BILIRUB UR QL STRIP.AUTO: NEGATIVE
BUN SERPL-MCNC: 26 MG/DL (ref 7–20)
CALCIUM SERPL-MCNC: 8.7 MG/DL (ref 8.3–10.6)
CANNABINOIDS UR QL SCN>50 NG/ML: ABNORMAL
CHLORIDE SERPL-SCNC: 107 MMOL/L (ref 99–110)
CLARITY UR: CLEAR
CO2 BLDV-SCNC: 24 MMOL/L
CO2 BLDV-SCNC: 24 MMOL/L
CO2 SERPL-SCNC: 22 MMOL/L (ref 21–32)
COCAINE UR QL SCN: ABNORMAL
COHGB MFR BLDV: 0.6 % (ref 0–1.5)
COHGB MFR BLDV: 1.4 % (ref 0–1.5)
COLOR UR: ABNORMAL
CREAT SERPL-MCNC: <0.5 MG/DL (ref 0.9–1.3)
DEPRECATED RDW RBC AUTO: 13.1 % (ref 12.4–15.4)
DRUG SCREEN COMMENT UR-IMP: ABNORMAL
EKG ATRIAL RATE: 159 BPM
EKG DIAGNOSIS: NORMAL
EKG P AXIS: 24 DEGREES
EKG P-R INTERVAL: 130 MS
EKG Q-T INTERVAL: 252 MS
EKG QRS DURATION: 102 MS
EKG QTC CALCULATION (BAZETT): 409 MS
EKG R AXIS: 119 DEGREES
EKG T AXIS: -12 DEGREES
EKG VENTRICULAR RATE: 159 BPM
EOSINOPHIL # BLD: 0 K/UL (ref 0–0.6)
EOSINOPHIL NFR BLD: 0 %
ETHANOLAMINE SERPL-MCNC: NORMAL MG/DL (ref 0–0.08)
FENTANYL SCREEN, URINE: ABNORMAL
FLUAV RNA RESP QL NAA+PROBE: NOT DETECTED
FLUBV RNA RESP QL NAA+PROBE: NOT DETECTED
GFR SERPLBLD CREATININE-BSD FMLA CKD-EPI: >60 ML/MIN/{1.73_M2}
GLUCOSE SERPL-MCNC: 189 MG/DL (ref 70–99)
GLUCOSE UR STRIP.AUTO-MCNC: 100 MG/DL
HCO3 BLDV-SCNC: 21.9 MMOL/L (ref 23–29)
HCO3 BLDV-SCNC: 22.4 MMOL/L (ref 23–29)
HCT VFR BLD AUTO: 42.7 % (ref 40.5–52.5)
HGB BLD-MCNC: 14 G/DL (ref 13.5–17.5)
HGB UR QL STRIP.AUTO: ABNORMAL
HYALINE CASTS #/AREA URNS LPF: ABNORMAL /LPF (ref 0–2)
KETONES UR STRIP.AUTO-MCNC: NEGATIVE MG/DL
LACTATE BLDV-SCNC: 2.1 MMOL/L (ref 0.4–2)
LEUKOCYTE ESTERASE UR QL STRIP.AUTO: NEGATIVE
LEVETIRACETAM SERPL-MCNC: >100 UG/ML (ref 6–46)
LYMPHOCYTES # BLD: 1.1 K/UL (ref 1–5.1)
LYMPHOCYTES NFR BLD: 8 %
MCH RBC QN AUTO: 31.4 PG (ref 26–34)
MCHC RBC AUTO-ENTMCNC: 32.8 G/DL (ref 31–36)
MCV RBC AUTO: 95.6 FL (ref 80–100)
MEDICATION DOSE-MCNC: ABNORMAL
METHADONE UR QL SCN>300 NG/ML: ABNORMAL
METHGB MFR BLDV: 0.3 %
METHGB MFR BLDV: 0.3 %
MONOCYTES # BLD: 1.3 K/UL (ref 0–1.3)
MONOCYTES NFR BLD: 10 %
NEUTROPHILS # BLD: 10.9 K/UL (ref 1.7–7.7)
NEUTROPHILS NFR BLD: 82 %
NITRITE UR QL STRIP.AUTO: NEGATIVE
O2 CT VFR BLDV CALC: 12 VOL %
O2 CT VFR BLDV CALC: 16 VOL %
O2 THERAPY: ABNORMAL
O2 THERAPY: ABNORMAL
OPIATES UR QL SCN>300 NG/ML: ABNORMAL
OXYCODONE UR QL SCN: ABNORMAL
PCO2 BLDV: 53.7 MMHG (ref 40–50)
PCO2 BLDV: 56.6 MMHG (ref 40–50)
PCP UR QL SCN>25 NG/ML: ABNORMAL
PH BLDV: 7.21 [PH] (ref 7.35–7.45)
PH BLDV: 7.24 [PH] (ref 7.35–7.45)
PH UR STRIP.AUTO: 7 [PH] (ref 5–8)
PH UR STRIP: 7 [PH]
PLATELET # BLD AUTO: 234 K/UL (ref 135–450)
PLATELET BLD QL SMEAR: ADEQUATE
PMV BLD AUTO: 9.4 FL (ref 5–10.5)
PO2 BLDV: 35.3 MMHG (ref 25–40)
PO2 BLDV: 44.4 MMHG (ref 25–40)
POTASSIUM SERPL-SCNC: 4.1 MMOL/L (ref 3.5–5.1)
PROCALCITONIN SERPL IA-MCNC: 1.8 NG/ML (ref 0–0.15)
PROT SERPL-MCNC: 7 G/DL (ref 6.4–8.2)
PROT UR STRIP.AUTO-MCNC: NEGATIVE MG/DL
RBC # BLD AUTO: 4.47 M/UL (ref 4.2–5.9)
RBC #/AREA URNS HPF: ABNORMAL /HPF (ref 0–4)
RENAL EPI CELLS #/AREA UR COMP ASSIST: ABNORMAL /HPF (ref 0–1)
SAO2 % BLDV: 57 %
SAO2 % BLDV: 70 %
SARS-COV-2 RNA RESP QL NAA+PROBE: DETECTED
SLIDE REVIEW: ABNORMAL
SODIUM SERPL-SCNC: 141 MMOL/L (ref 136–145)
SP GR UR STRIP.AUTO: 1.02 (ref 1–1.03)
UA COMPLETE W REFLEX CULTURE PNL UR: ABNORMAL
UA DIPSTICK W REFLEX MICRO PNL UR: YES
URN SPEC COLLECT METH UR: ABNORMAL
UROBILINOGEN UR STRIP-ACNC: 0.2 E.U./DL
VALPROATE SERPL-MCNC: 43 UG/ML (ref 50–100)
WBC # BLD AUTO: 13.3 K/UL (ref 4–11)
WBC #/AREA URNS HPF: ABNORMAL /HPF (ref 0–5)

## 2024-01-05 PROCEDURE — 36415 COLL VENOUS BLD VENIPUNCTURE: CPT

## 2024-01-05 PROCEDURE — 96368 THER/DIAG CONCURRENT INF: CPT

## 2024-01-05 PROCEDURE — 84450 TRANSFERASE (AST) (SGOT): CPT

## 2024-01-05 PROCEDURE — 80307 DRUG TEST PRSMV CHEM ANLYZR: CPT

## 2024-01-05 PROCEDURE — APPNB30 APP NON BILLABLE TIME 0-30 MINS

## 2024-01-05 PROCEDURE — 72125 CT NECK SPINE W/O DYE: CPT

## 2024-01-05 PROCEDURE — 2580000003 HC RX 258: Performed by: EMERGENCY MEDICINE

## 2024-01-05 PROCEDURE — 84145 PROCALCITONIN (PCT): CPT

## 2024-01-05 PROCEDURE — 2500000003 HC RX 250 WO HCPCS: Performed by: EMERGENCY MEDICINE

## 2024-01-05 PROCEDURE — 87636 SARSCOV2 & INF A&B AMP PRB: CPT

## 2024-01-05 PROCEDURE — 6360000002 HC RX W HCPCS: Performed by: EMERGENCY MEDICINE

## 2024-01-05 PROCEDURE — 31645 BRNCHSC W/THER ASPIR 1ST: CPT | Performed by: INTERNAL MEDICINE

## 2024-01-05 PROCEDURE — 2000000000 HC ICU R&B

## 2024-01-05 PROCEDURE — 99291 CRITICAL CARE FIRST HOUR: CPT | Performed by: STUDENT IN AN ORGANIZED HEALTH CARE EDUCATION/TRAINING PROGRAM

## 2024-01-05 PROCEDURE — 31622 DX BRONCHOSCOPE/WASH: CPT

## 2024-01-05 PROCEDURE — 31500 INSERT EMERGENCY AIRWAY: CPT

## 2024-01-05 PROCEDURE — 87186 SC STD MICRODIL/AGAR DIL: CPT

## 2024-01-05 PROCEDURE — 87040 BLOOD CULTURE FOR BACTERIA: CPT

## 2024-01-05 PROCEDURE — 82140 ASSAY OF AMMONIA: CPT

## 2024-01-05 PROCEDURE — C9113 INJ PANTOPRAZOLE SODIUM, VIA: HCPCS

## 2024-01-05 PROCEDURE — 96375 TX/PRO/DX INJ NEW DRUG ADDON: CPT

## 2024-01-05 PROCEDURE — 2700000000 HC OXYGEN THERAPY PER DAY

## 2024-01-05 PROCEDURE — 6360000002 HC RX W HCPCS

## 2024-01-05 PROCEDURE — 93010 ELECTROCARDIOGRAM REPORT: CPT | Performed by: INTERNAL MEDICINE

## 2024-01-05 PROCEDURE — 99285 EMERGENCY DEPT VISIT HI MDM: CPT

## 2024-01-05 PROCEDURE — 84132 ASSAY OF SERUM POTASSIUM: CPT

## 2024-01-05 PROCEDURE — 5A1945Z RESPIRATORY VENTILATION, 24-96 CONSECUTIVE HOURS: ICD-10-PCS | Performed by: STUDENT IN AN ORGANIZED HEALTH CARE EDUCATION/TRAINING PROGRAM

## 2024-01-05 PROCEDURE — 86403 PARTICLE AGGLUT ANTBDY SCRN: CPT

## 2024-01-05 PROCEDURE — 95700 EEG CONT REC W/VID EEG TECH: CPT

## 2024-01-05 PROCEDURE — 85025 COMPLETE CBC W/AUTO DIFF WBC: CPT

## 2024-01-05 PROCEDURE — 6360000002 HC RX W HCPCS: Performed by: STUDENT IN AN ORGANIZED HEALTH CARE EDUCATION/TRAINING PROGRAM

## 2024-01-05 PROCEDURE — 95713 VEEG 2-12 HR CONT MNTR: CPT

## 2024-01-05 PROCEDURE — 2580000003 HC RX 258

## 2024-01-05 PROCEDURE — 94002 VENT MGMT INPAT INIT DAY: CPT

## 2024-01-05 PROCEDURE — 87205 SMEAR GRAM STAIN: CPT

## 2024-01-05 PROCEDURE — 31624 DX BRONCHOSCOPE/LAVAGE: CPT | Performed by: INTERNAL MEDICINE

## 2024-01-05 PROCEDURE — 0B9J8ZX DRAINAGE OF LEFT LOWER LUNG LOBE, VIA NATURAL OR ARTIFICIAL OPENING ENDOSCOPIC, DIAGNOSTIC: ICD-10-PCS | Performed by: INTERNAL MEDICINE

## 2024-01-05 PROCEDURE — 71045 X-RAY EXAM CHEST 1 VIEW: CPT

## 2024-01-05 PROCEDURE — 6360000002 HC RX W HCPCS: Performed by: INTERNAL MEDICINE

## 2024-01-05 PROCEDURE — 89220 SPUTUM SPECIMEN COLLECTION: CPT

## 2024-01-05 PROCEDURE — 96365 THER/PROPH/DIAG IV INF INIT: CPT

## 2024-01-05 PROCEDURE — 83605 ASSAY OF LACTIC ACID: CPT

## 2024-01-05 PROCEDURE — 71260 CT THORAX DX C+: CPT

## 2024-01-05 PROCEDURE — 87077 CULTURE AEROBIC IDENTIFY: CPT

## 2024-01-05 PROCEDURE — 81001 URINALYSIS AUTO W/SCOPE: CPT

## 2024-01-05 PROCEDURE — 70450 CT HEAD/BRAIN W/O DYE: CPT

## 2024-01-05 PROCEDURE — 80177 DRUG SCRN QUAN LEVETIRACETAM: CPT

## 2024-01-05 PROCEDURE — 80053 COMPREHEN METABOLIC PANEL: CPT

## 2024-01-05 PROCEDURE — 93005 ELECTROCARDIOGRAM TRACING: CPT | Performed by: EMERGENCY MEDICINE

## 2024-01-05 PROCEDURE — 87070 CULTURE OTHR SPECIMN AEROBIC: CPT

## 2024-01-05 PROCEDURE — 82803 BLOOD GASES ANY COMBINATION: CPT

## 2024-01-05 PROCEDURE — 82077 ASSAY SPEC XCP UR&BREATH IA: CPT

## 2024-01-05 PROCEDURE — 87641 MR-STAPH DNA AMP PROBE: CPT

## 2024-01-05 PROCEDURE — 80164 ASSAY DIPROPYLACETIC ACD TOT: CPT

## 2024-01-05 PROCEDURE — 6360000004 HC RX CONTRAST MEDICATION: Performed by: EMERGENCY MEDICINE

## 2024-01-05 PROCEDURE — 96367 TX/PROPH/DG ADDL SEQ IV INF: CPT

## 2024-01-05 PROCEDURE — 96366 THER/PROPH/DIAG IV INF ADDON: CPT

## 2024-01-05 PROCEDURE — 2580000003 HC RX 258: Performed by: INTERNAL MEDICINE

## 2024-01-05 PROCEDURE — 2500000003 HC RX 250 WO HCPCS

## 2024-01-05 PROCEDURE — 94761 N-INVAS EAR/PLS OXIMETRY MLT: CPT

## 2024-01-05 RX ORDER — ONDANSETRON 2 MG/ML
4 INJECTION INTRAMUSCULAR; INTRAVENOUS EVERY 6 HOURS PRN
Status: DISCONTINUED | OUTPATIENT
Start: 2024-01-05 | End: 2024-01-13 | Stop reason: HOSPADM

## 2024-01-05 RX ORDER — ACETAMINOPHEN 325 MG/1
650 TABLET ORAL EVERY 6 HOURS PRN
Status: DISCONTINUED | OUTPATIENT
Start: 2024-01-05 | End: 2024-01-13 | Stop reason: HOSPADM

## 2024-01-05 RX ORDER — LEVETIRACETAM 500 MG/5ML
1000 INJECTION, SOLUTION, CONCENTRATE INTRAVENOUS EVERY 12 HOURS
Status: DISCONTINUED | OUTPATIENT
Start: 2024-01-05 | End: 2024-01-09

## 2024-01-05 RX ORDER — PROPOFOL 10 MG/ML
5-50 INJECTION, EMULSION INTRAVENOUS CONTINUOUS
Status: DISCONTINUED | OUTPATIENT
Start: 2024-01-05 | End: 2024-01-08 | Stop reason: ALTCHOICE

## 2024-01-05 RX ORDER — MIDAZOLAM HYDROCHLORIDE 1 MG/ML
10 INJECTION INTRAMUSCULAR; INTRAVENOUS ONCE
Status: DISCONTINUED | OUTPATIENT
Start: 2024-01-05 | End: 2024-01-05

## 2024-01-05 RX ORDER — POLYETHYLENE GLYCOL 3350 17 G/17G
17 POWDER, FOR SOLUTION ORAL DAILY PRN
Status: DISCONTINUED | OUTPATIENT
Start: 2024-01-05 | End: 2024-01-13 | Stop reason: HOSPADM

## 2024-01-05 RX ORDER — ACETAMINOPHEN 650 MG/1
650 SUPPOSITORY RECTAL EVERY 6 HOURS PRN
Status: DISCONTINUED | OUTPATIENT
Start: 2024-01-05 | End: 2024-01-13 | Stop reason: HOSPADM

## 2024-01-05 RX ORDER — MIDAZOLAM HYDROCHLORIDE 1 MG/ML
10 INJECTION INTRAMUSCULAR; INTRAVENOUS ONCE
Status: DISCONTINUED | OUTPATIENT
Start: 2024-01-05 | End: 2024-01-05 | Stop reason: HOSPADM

## 2024-01-05 RX ORDER — ROCURONIUM BROMIDE 10 MG/ML
INJECTION, SOLUTION INTRAVENOUS DAILY PRN
Status: COMPLETED | OUTPATIENT
Start: 2024-01-05 | End: 2024-01-05

## 2024-01-05 RX ORDER — MIDAZOLAM HYDROCHLORIDE 5 MG/ML
INJECTION INTRAMUSCULAR; INTRAVENOUS
Status: COMPLETED
Start: 2024-01-05 | End: 2024-01-05

## 2024-01-05 RX ORDER — LORAZEPAM 2 MG/ML
4 INJECTION INTRAMUSCULAR EVERY 5 MIN PRN
Status: DISCONTINUED | OUTPATIENT
Start: 2024-01-05 | End: 2024-01-13 | Stop reason: HOSPADM

## 2024-01-05 RX ORDER — PANTOPRAZOLE SODIUM 40 MG/10ML
40 INJECTION, POWDER, LYOPHILIZED, FOR SOLUTION INTRAVENOUS DAILY
Status: DISCONTINUED | OUTPATIENT
Start: 2024-01-05 | End: 2024-01-13 | Stop reason: HOSPADM

## 2024-01-05 RX ORDER — ETOMIDATE 2 MG/ML
INJECTION INTRAVENOUS
Status: DISCONTINUED
Start: 2024-01-05 | End: 2024-01-05 | Stop reason: HOSPADM

## 2024-01-05 RX ORDER — ROCURONIUM BROMIDE 10 MG/ML
INJECTION, SOLUTION INTRAVENOUS
Status: DISCONTINUED
Start: 2024-01-05 | End: 2024-01-05 | Stop reason: HOSPADM

## 2024-01-05 RX ORDER — ENOXAPARIN SODIUM 100 MG/ML
40 INJECTION SUBCUTANEOUS DAILY
Status: DISCONTINUED | OUTPATIENT
Start: 2024-01-05 | End: 2024-01-09

## 2024-01-05 RX ORDER — DIVALPROEX SODIUM 125 MG/1
250 CAPSULE, COATED PELLETS ORAL EVERY 8 HOURS SCHEDULED
Status: DISCONTINUED | OUTPATIENT
Start: 2024-01-05 | End: 2024-01-05

## 2024-01-05 RX ORDER — 0.9 % SODIUM CHLORIDE 0.9 %
1000 INTRAVENOUS SOLUTION INTRAVENOUS ONCE
Status: COMPLETED | OUTPATIENT
Start: 2024-01-05 | End: 2024-01-05

## 2024-01-05 RX ORDER — MIDAZOLAM HYDROCHLORIDE 5 MG/ML
10 INJECTION INTRAMUSCULAR; INTRAVENOUS ONCE
Status: COMPLETED | OUTPATIENT
Start: 2024-01-05 | End: 2024-01-05

## 2024-01-05 RX ORDER — SODIUM CHLORIDE, SODIUM LACTATE, POTASSIUM CHLORIDE, CALCIUM CHLORIDE 600; 310; 30; 20 MG/100ML; MG/100ML; MG/100ML; MG/100ML
INJECTION, SOLUTION INTRAVENOUS CONTINUOUS
Status: ACTIVE | OUTPATIENT
Start: 2024-01-05 | End: 2024-01-06

## 2024-01-05 RX ORDER — SODIUM CHLORIDE 9 MG/ML
INJECTION, SOLUTION INTRAVENOUS PRN
Status: DISCONTINUED | OUTPATIENT
Start: 2024-01-05 | End: 2024-01-13 | Stop reason: HOSPADM

## 2024-01-05 RX ORDER — SODIUM CHLORIDE 0.9 % (FLUSH) 0.9 %
5-40 SYRINGE (ML) INJECTION PRN
Status: DISCONTINUED | OUTPATIENT
Start: 2024-01-05 | End: 2024-01-13 | Stop reason: HOSPADM

## 2024-01-05 RX ORDER — ETOMIDATE 2 MG/ML
20 INJECTION INTRAVENOUS ONCE
Status: DISCONTINUED | OUTPATIENT
Start: 2024-01-05 | End: 2024-01-05 | Stop reason: HOSPADM

## 2024-01-05 RX ORDER — ONDANSETRON 4 MG/1
4 TABLET, ORALLY DISINTEGRATING ORAL EVERY 8 HOURS PRN
Status: DISCONTINUED | OUTPATIENT
Start: 2024-01-05 | End: 2024-01-13 | Stop reason: HOSPADM

## 2024-01-05 RX ORDER — ETOMIDATE 2 MG/ML
INJECTION INTRAVENOUS DAILY PRN
Status: COMPLETED | OUTPATIENT
Start: 2024-01-05 | End: 2024-01-05

## 2024-01-05 RX ORDER — ROCURONIUM BROMIDE 10 MG/ML
80 INJECTION, SOLUTION INTRAVENOUS ONCE
Status: DISCONTINUED | OUTPATIENT
Start: 2024-01-05 | End: 2024-01-05 | Stop reason: HOSPADM

## 2024-01-05 RX ORDER — THIAMINE HYDROCHLORIDE 100 MG/ML
100 INJECTION, SOLUTION INTRAMUSCULAR; INTRAVENOUS
Status: ACTIVE | OUTPATIENT
Start: 2024-01-05 | End: 2024-01-06

## 2024-01-05 RX ORDER — PROPOFOL 10 MG/ML
5-50 INJECTION, EMULSION INTRAVENOUS CONTINUOUS
Status: DISCONTINUED | OUTPATIENT
Start: 2024-01-05 | End: 2024-01-05 | Stop reason: HOSPADM

## 2024-01-05 RX ORDER — SODIUM CHLORIDE 0.9 % (FLUSH) 0.9 %
5-40 SYRINGE (ML) INJECTION EVERY 12 HOURS SCHEDULED
Status: DISCONTINUED | OUTPATIENT
Start: 2024-01-05 | End: 2024-01-13 | Stop reason: HOSPADM

## 2024-01-05 RX ADMIN — ROCURONIUM BROMIDE 80 MG: 10 INJECTION, SOLUTION INTRAVENOUS at 07:04

## 2024-01-05 RX ADMIN — MIDAZOLAM HYDROCHLORIDE 2 MG/HR: 5 INJECTION, SOLUTION INTRAMUSCULAR; INTRAVENOUS at 12:19

## 2024-01-05 RX ADMIN — SODIUM CHLORIDE 1000 ML: 9 INJECTION, SOLUTION INTRAVENOUS at 12:08

## 2024-01-05 RX ADMIN — SODIUM CHLORIDE, PRESERVATIVE FREE 5 ML: 5 INJECTION INTRAVENOUS at 20:33

## 2024-01-05 RX ADMIN — ETOMIDATE 20 MG: 2 INJECTION, SOLUTION INTRAVENOUS at 07:03

## 2024-01-05 RX ADMIN — LEVETIRACETAM 1000 MG: 500 INJECTION, SOLUTION, CONCENTRATE INTRAVENOUS at 17:54

## 2024-01-05 RX ADMIN — LEVETIRACETAM 2610 MG: 100 INJECTION, SOLUTION INTRAVENOUS at 07:21

## 2024-01-05 RX ADMIN — WATER 40 MG: 1 INJECTION INTRAMUSCULAR; INTRAVENOUS; SUBCUTANEOUS at 17:54

## 2024-01-05 RX ADMIN — ENOXAPARIN SODIUM 40 MG: 100 INJECTION SUBCUTANEOUS at 16:27

## 2024-01-05 RX ADMIN — IOPAMIDOL 75 ML: 755 INJECTION, SOLUTION INTRAVENOUS at 08:27

## 2024-01-05 RX ADMIN — PROPOFOL 10 MCG/KG/MIN: 10 INJECTION, EMULSION INTRAVENOUS at 07:29

## 2024-01-05 RX ADMIN — VALPROATE SODIUM 250 MG: 100 INJECTION, SOLUTION INTRAVENOUS at 22:50

## 2024-01-05 RX ADMIN — PROPOFOL 50 MCG/KG/MIN: 10 INJECTION, EMULSION INTRAVENOUS at 16:36

## 2024-01-05 RX ADMIN — SODIUM CHLORIDE, POTASSIUM CHLORIDE, SODIUM LACTATE AND CALCIUM CHLORIDE: 600; 310; 30; 20 INJECTION, SOLUTION INTRAVENOUS at 18:33

## 2024-01-05 RX ADMIN — PANTOPRAZOLE SODIUM 40 MG: 40 INJECTION, POWDER, FOR SOLUTION INTRAVENOUS at 16:27

## 2024-01-05 RX ADMIN — AMPICILLIN SODIUM, SULBACTAM SODIUM 3000 MG: 2; 1 INJECTION, POWDER, FOR SOLUTION INTRAMUSCULAR; INTRAVENOUS at 20:20

## 2024-01-05 RX ADMIN — VANCOMYCIN HYDROCHLORIDE 1250 MG: 10 INJECTION, POWDER, LYOPHILIZED, FOR SOLUTION INTRAVENOUS at 10:21

## 2024-01-05 RX ADMIN — MIDAZOLAM 10 MG: 5 INJECTION INTRAMUSCULAR; INTRAVENOUS at 12:04

## 2024-01-05 RX ADMIN — CEFEPIME 2000 MG: 2 INJECTION, POWDER, FOR SOLUTION INTRAVENOUS at 09:38

## 2024-01-05 RX ADMIN — PROPOFOL 50 MCG/KG/MIN: 10 INJECTION, EMULSION INTRAVENOUS at 19:46

## 2024-01-05 RX ADMIN — MIDAZOLAM HYDROCHLORIDE 10 MG: 5 INJECTION INTRAMUSCULAR; INTRAVENOUS at 12:04

## 2024-01-05 ASSESSMENT — PULMONARY FUNCTION TESTS
PIF_VALUE: 26
PIF_VALUE: 18
PIF_VALUE: 27
PIF_VALUE: 24
PIF_VALUE: 19
PIF_VALUE: 22
PIF_VALUE: 17
PIF_VALUE: 29

## 2024-01-05 ASSESSMENT — PAIN - FUNCTIONAL ASSESSMENT: PAIN_FUNCTIONAL_ASSESSMENT: NONE - DENIES PAIN

## 2024-01-05 NOTE — PROGRESS NOTES
Admitted to [facility] [place] ED. Intubated, sedated. Propofol infusing at 40 mcg/kg/min. NG clamped. [place] draining clear yellow urine.

## 2024-01-05 NOTE — H&P
I have personally seen and examined the patient independently. I have reviewed the patient's available data,including medical history and recent test results. Reviewed and discussed note as documented by resident .  I agree with the physical exam findings, assessment and plan. I personally performed a substantive portion of the visit including all aspects of the medical decision making.        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  Patient got Versed in the ED by the EMS and continued to seize.  Patient was loaded with Keppra.  Patient also threw up and was unable to protect his airway hence was intubated on admission.  Patient's Keppra dose was recently changed increased from 800 to 1000 and Depakote dose was decreased from 500-250 twice daily.     On exam  Patient is tachycardic intubated sedated  On lung exam right-sided rhonchi present  Neurological exam unable to assess due to intubation  Cardiovascular normal rate and rhythm        Seizures  -Neurocritical care consulted  -Continue IV Keppra  -Depakote dose increased to 3 times daily.  Depakote level low on admission  -Continue propofol drip  -EEG  -Continue neurocheck     Aspiration pneumonia with mucous plugging  -Start Unasyn  -Discussed with critical care if patient needs a bronchoscopy.  Patient is on FiO2 of 35% so may not be necessary  -CT chest shows mucous plugging with resultant middle and lower lobe with airspace disease     Sepsis -present on admission  -With tachypnea leukocytosis and tachycardia  -IV fluids  -Blood cultures        Recent COVID infection  -Diagnosed on 10/27  -Positive     Developmental elevated NP partial trisomy  -Lives in a group home at baseline alert does not walk     Due to the immediate potential for life-threatening deterioration due status  long seizure requiring versed drip and intubation on 1/5/24. Recent occurrences of breakthrough seizures after extended time seizure-free. Has been tied with infection (pneumonia for the last occurrence) and pt is COVID positive. Recent dose adjustments in anti-seizure medications.   - Neurocritical care consulted; appreciate recs  - Depakote level subtherapeutic  - Antiepileptic regimen    - Depakote 250 mg TID. Depakote level found to be subtherapeutic   - Keppra 1g IV BID  - Started on cEEG  - Methylprednizolone 40 mg BID  - Seizure precautions  - Currently intubated for airway protection   - On propofol only for sedation  - PRN ativan and thiamine ordered for seizures    Global developmental delay  10p partial trisomy syndrome  Recently moved to a facility on 8/2023 for care. Pt nonverbal. At baseline, does not walk either, but is alert and swallows. Eats purees    Pulmonary  Intubated for airway protection     Suspected aspiration pneumonia  Vomiting episode noted on chart review during seizure. CT chest concerning for RLL consolidation concerning for possible mucus plug.   - Unasyn 3g q6h ordered  -  mL/hr ordered  - Respiratory culture ordered  - Blood cultures ordered  - Procalcitonin, lactic acid ordered  - CXR ordered  - MRSA swab ordered  - Will discuss with critical care pulmonologist on possible need for bronchoscopy given concern for mucus plug    COVID positive  Initial COVID test positive on 12/27.  - Supportive treatment as needed     Cardiovascular  No acute concerns at this time    GI  Diet: Diet NPO  GI ppx: Pantoprazole 40 mg IV qd     Renal   No ongoing concerns      No ongoing concerns    Heme  No ongoing concerns    Code Status: Full Code   PPX: lovenox injection  DISPO: ICU    Abhishek Sheehan MD, PGY-1  Internal Medicine Resident  Contact via [employer]  01/05/24  6:20 PM    This patient has been staffed and discussed with Dr. Phyllis MD

## 2024-01-05 NOTE — ED PROVIDER NOTES
As physician-in-triage, I performed a medical screening history and physical exam on this patient.    HISTORY OF PRESENT ILLNESS  Jose Rodríguez is a 23 y.o. male who had a 20-minute seizure reportedly prior to arrival from a group home with known seizure history.  He had tonic-clonic movement prior to arrival per EMS and received 5 mg IM Versed.  He was reportedly COVID-positive December 27.  History and physical are severely limited due to being postictal at this time.  EMS reported there was concern that he may have aspirated with emesis prior to arrival.     PHYSICAL EXAM  BP (!) 152/107   Pulse (!) 161   Resp 20   Wt 43.5 kg (96 lb)   SpO2 95%   BMI 24.03 kg/m²   GEN: Postictal, obtunded   HEENT, dry mucous membranes, pupils 6 mm bilaterally  Neck: Nontender, no neck manipulations done at this time   Respiratory: Bilateral breath sounds present with rhonchi throughout  Abdomen: Distended    PROCEDURE:  ENDOTRACHEAL INTUBATION  The benefits, risks, and alternatives were NOT discussed with Jose Rodríguez or their surrogate since emergent intubation. Jose Rodríguez was pre-oxygenated as best as possible and apneic oxygenation was used. Suction was ready. The patient was sedated, then paralyzed; please see the chart for the medications and dosages administered. The vocal cords were visualized, and the endotracheal tube was inserted without complication using video guidance and I saw the tube pass the cords without any issues. Tracheal position was confirmed using auscultation, capnometry, tube condensation, and chest x-ray. The tube was appropriately secured. Sedation was provided for endotracheal tube and ventilatory comfort.  Nurse Kohli did hold C-spine during procedure to error on the side of caution and avoid any neck complications.      Critical Care Time:    I personally saw the patient and independently provided 20 minutes of non-concurrent critical care out of the total  shared critical care time provided.  Includes repeat examinations, speaking with consultants, lab interpretation, charting  Excludes separate billable procedures.  Patient at risk for serious decompensation if not treated for this life-threatening condition.              MDM: Patient was evaluated due to concern for prolonged seizure activity and being obtunded on arrival.  I did intubate the patient.  Otherwise, Dr. Jaffe came on shortly after I intubated the patient and ultimately took over the care of the patient and was going to follow-up on chest x-ray and further treatment.  Please see his note for further care of the patient while in the emergency department and final disposition.        Comment: Please note this report has been produced using speech recognition software and may contain errors related to that system including errors in grammar, punctuation, and spelling, as well as words and phrases that may be inappropriate. If there are any questions or concerns please feel free to contact the dictating provider for clarification.               Benson Luna MD  01/05/24 [id number]

## 2024-01-05 NOTE — ED PROVIDER NOTES
[facility] ED  EMERGENCY DEPARTMENT ENCOUNTER        Patient Name: Jose Rodríguez  MRN: [id number]  Birthdate 2000  Date of evaluation: 1/5/2024  Provider: Brayan Jaffe MD  PCP: No primary care provider on file.  Note Started: 7:30 AM EST 1/5/24    CHIEF COMPLAINT       Seizures ([place]. [place] [facility] [place]20 minutes. Pt. Arrives bagged by EMS, unresponsive, 5 mg versed given en route. Covid positive. )      HISTORY OF PRESENT ILLNESS: 1 or more Elements     History from : EMS    Limitations to history : Altered Mental Status    Jose Rodríguez is a 23 y.o. male who presents for evaluation of seizure, unresponsiveness.  Please see Dr. Luna's note for the initial management.  Patient reportedly had a 20-minute seizure from EMS.  Patient arrived unresponsive and was given 5 mg Versed and route.  He is recently COVID-positive.  He does have history of seizure disorder.  He was hospitalized in November secondary to seizure disorder.  He is currently on Depakote and Keppra.  As far as known, patient has been compliant with his medication.  Reportedly seizures have been well-controlled over the past few years.  He has full code.  Patient was intubated upon arrival.    Nursing Notes were all reviewed and agreed with or any disagreements were addressed in the HPI.    REVIEW OF SYSTEMS :      Review of Systems    Unable to be obtained secondary to altered mental status, unresponsiveness    SURGICAL HISTORY     Past Surgical History:   Procedure Laterality Date    CARDIAC SURGERY      SPINAL FIXATION SURGERY         CURRENTMEDICATIONS       Previous Medications    DEXTROMETHORPHAN-GUAIFENESIN (ROBITUSSIN-DM)  MG/5ML SYRUP    Take 10 mLs by mouth every 6 hours as needed for Cough (cough and shortness of breath)    DIVALPROEX (DEPAKOTE SPRINKLE) 125 MG DR CAPSULE    Take 2 capsules by mouth every 12 hours     GUAIFENESIN-DEXTROMETHORPHAN (ROBITUSSIN DM) 100-10 MG/5ML SYRUP    Take 5 mLs by mouth 3 times daily as needed for Cough    LEVETIRACETAM (KEPPRA) 100 MG/ML SOLUTION    Take 10 mLs by mouth 2 times daily    PANTOPRAZOLE (PROTONIX) 20 MG TABLET    Take 1 tablet by mouth daily       ALLERGIES     Patient has no known allergies.    FAMILYHISTORY     No family history on file.     SOCIAL HISTORY       Social History     Tobacco Use    Smoking status: Never    Smokeless tobacco: Never   Vaping Use    Vaping Use: Never used   Substance Use Topics    Alcohol use: Not Currently    Drug use: Not Currently       SCREENINGS                         CIWA Assessment  BP: (!) 128/95  Pulse: (!) 111           PHYSICAL EXAM  1 or more Elements     ED Triage Vitals [01/05/24 0658]   BP Temp Temp src Pulse Respirations SpO2 Height Weight - Scale   (!) 152/107 -- -- (!) 161 20 95 % -- 43.5 kg (96 lb)       General: Obtunded, GCS 3,  Cardiac: Echocardiac  Chest: There is tachypnea, increased accessory muscle use  Extremities:No significant lower extremity edema. Lower extremities are symmetric.  Contracted extremities  Neuro: No spontaneous movement of the extremities not withdrawing to pain, GCS 3  Skin:No rash, no erythema  Psych: Calm and cooperative.      DIAGNOSTIC RESULTS   LABS:    Labs Reviewed   COVID-19 & INFLUENZA COMBO - Abnormal; Notable for the following components:       Result Value    SARS-CoV-2 RNA, RT PCR DETECTED (*)     All other components within normal limits   CBC WITH AUTO DIFFERENTIAL - Abnormal; Notable for the following components:    WBC 13.3 (*)     Neutrophils Absolute 10.9 (*)     All other components within normal limits   COMPREHENSIVE METABOLIC PANEL W/ REFLEX TO MG FOR LOW K - Abnormal; Notable for the following components:    Glucose 189 (*)     BUN 26 (*)     Creatinine <0.5 (*)     All other components within normal limits   URINALYSIS WITH REFLEX TO CULTURE - Abnormal; Notable for the following  01/05/24 1124 01/05/24 1133 01/05/24 1210   BP: 123/76 (!) 160/98 121/83 (!) 128/95   Pulse: (!) 126 (!) 119 (!) 111    Resp: 21 24 24 24   Temp:    99.3 °F (37.4 °C)   TempSrc:    Core   SpO2:  100% 100% 100%   Weight:           Patient was treated with and given the following medications:  Medications   rocuronium (ZEMURON) injection 80 mg (has no administration in time range)   etomidate (AMIDATE) injection 20 mg (has no administration in time range)   propofol infusion (40 mcg/kg/min × 43.5 kg IntraVENous Rate/Dose Change 1/5/24 1124)   midazolam (VERSED) 1 mg/mL in NS infusion (2 mg/hr IntraVENous New Bag 1/5/24 1219)   midazolam (VERSED) injection 10 mg (has no administration in time range)   sodium chloride 0.9 % bolus 1,000 mL (1,000 mLs IntraVENous New Bag 1/5/24 1208)   levETIRAcetam (KEPPRA) 2,610 mg in sodium chloride 0.9 % 100 mL IVPB (0 mg IntraVENous Stopped 1/5/24 0830)   etomidate (AMIDATE) injection (20 mg IntraVENous Given 1/5/24 0703)   rocuronium (ZEMURON) injection (80 mg IntraVENous Given 1/5/24 0704)   iopamidol (ISOVUE-370) 76 % injection 75 mL (75 mLs IntraVENous Given 1/5/24 0827)   vancomycin (VANCOCIN) 1,250 mg in sodium chloride 0.9 % 250 mL IVPB (0 mg IntraVENous Stopped 1/5/24 1208)   ceFEPIme (MAXIPIME) 2,000 mg in sodium chloride 0.9 % 100 mL IVPB (mini-bag) (0 mg IntraVENous Stopped 1/5/24 1011)   midazolam (VERSED) injection 10 mg (10 mg IntraVENous Given 1/5/24 1204)             Is this patient to be included in the SEP-1 Core Measure due to severe sepsis or septic shock?   No   Exclusion criteria - the patient is NOT to be included for SEP-1 Core Measure due to:  Alternative explanation for abnormal labs/vitals that do not relate to sepsis, see MDM for further explanation    CC/HPI Summary, DDx, ED Course, and Reassessment:     23-year-old male with history of seizure disorder presenting per EMS for evaluation of seizure arrived unresponsive and patient was intubated by the  contain errors related to that system including errors in grammar, punctuation, and spelling, as well as words and phrases that may be inappropriate. If there are any questions or concerns please feel free to contact the dictating provider for clarification.)    MD Ld Francois Benjamin, MD  01/05/24 1220       Brayan Jaffe MD  01/05/24 7958

## 2024-01-05 NOTE — PLAN OF CARE
[facility] Hospitalist brief note  Consult received.  Case reviewed with ER physician Dr. 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  Patient takes Keppra and Depakote for his seizures patient received Versed in the field upon arriving to [facility] he was still seizing.  Is also noted patient had vomited and concerns about aspiration pneumonia.  Patient was intubated at [facility] and started treatment for aspiration pneumonia which was seen on CT scan.  CT head was unremarkable CT chest did show concerns for pneumonia.  They spoke to the neurology team here as well.      Please contact hospitalist on-call and resident on-call for ICU when patient arrives      Full note to follow.    PCP is No primary care provider on file.    Thanks  Lew Gross MD

## 2024-01-05 NOTE — PROGRESS NOTES
4 Eyes Skin Assessment     NAME:  Jose Rodríguez  YOB: 2000  MEDICAL RECORD NUMBER:  [id number]    The patient is being assessed for  Admission    I agree that at least one RN has performed a thorough Head to Toe Skin Assessment on the patient. ALL assessment sites listed below have been assessed.      Areas assessed by both nurses:    Head, Face, Ears, Shoulders, Back, Chest, Arms, Elbows, Hands, Sacrum. Buttock, Coccyx, Ischium, Legs. Feet and Heels, and Under Medical Devices  Blanchable redness sharon  rectal area, pencil eraser size redness left lower ear.        Does the Patient have a Wound? No noted wound(s)       Priyank Prevention initiated by RN: Yes  Wound Care Orders initiated by RN: No    Pressure Injury (Stage 3,4, Unstageable, DTI, NWPT, and Complex wounds) if present, place Wound referral order by RN under : No    New Ostomies, if present place, Ostomy referral order under : No     Nurse 1 eSignature: Electronically signed by Conrad Cullen RN on 1/5/24 at 6:36 PM EST    **SHARE this note so that the co-signing nurse can place an eSignature**    Nurse 2 eSignature: Electronically signed by Shiloh Silvestre RN on 1/5/24 at 6:50 PM EST

## 2024-01-05 NOTE — PROGRESS NOTES
Respiratory Therapy Bronchoscopy Assist      Name:  Jose Rodríguez  Medical Record Number:  [id number]  Age: 23 y.o.   Gender: male  : 2000  Today's Date:  2024  Room:  [address] [place] [address]      BAL cath samples obtained from LL lung during bronchoscopy assist with patient on 100%.  Sterile field maintained throughout procedure.  Patient was pre-sedated.  300 mL of saline instilled.  250 mL of cloudy fluid obtained.  Pt tolerated procedure well.  Samples sent to lab for testing.  Patient SpO2 within acceptable range throughout bronchscopy procedure.  Physician assisted with bronch from 1430PM to 1450 PM without complications. Bronchoscope ID: [id number]           Electronically signed by Jhona Cuevas RCP on 2024 at 6:36 PM

## 2024-01-05 NOTE — CONSULTS
Neurocritical Care Consultation Note      Patient: Jose Rodríguez MRN: [id number]    YOB: 2000  Age: 23 y.o.  Sex: male   Unit: [facility] ICU TOWER Room/Bed: 4506/4506-01 Location: [facility]    Date of Consultation: 1/5/2024  Date of Admission: 1/5/2024  3:16 PM ( LOS: 0 days )  Admitting Physician: CYNTHIA CALABRESE    Primary Care Physician: No primary care provider on file.   Consult Requested By:      Reason for Consult: \"ICU\"    ASSESSMENT & RECOMMENDATIONS     Assessment  23 M with PMH of seizure disorder who presented with SE in the setting of COVID. Infection appeared to be a trigger for his SZ breakthrough. Last breakthrough SZ he had PNA  His VPA is subtherapeutic 43 (  BID), which could also be the contributor     Recommendations  Continue Keppra 1000BID  Increased VPA from 250 BID to 250 TID  Check ammonia levels   cEEG   Neuro q1      SUBJECTIVE     Chief Complaint:   Seizure    History of Present Illness:  Jose Rodríguez is a 23 y.o. M with PMH of developmental delay, 10p partial trisomy, seizure disorder who presented with GTC. HPI obtained from ED notes and sister at bedside. His baseline is non verbal, does not follow commands, bed ridden.  Patient was diagnosed with COVID on 12/27/23. He lives at group home. Reportedly, he had 20 mins of GTC prior EMS. 5 versed was given and he continue to have seizure. Additional Keppra load 2600 were given and patient was intubated for airway protection due to GCS3. He is transferred to [facility] for cEEG.     Patient had a recent admission for breakthrough seizure. [facility] and he takes Keppra 800 BID and  BID prior that admission. He was seizure free for 10 years. During the hospitalization, he was found to have PNA, Ammonia 106. As a result, his VPA was decreased to 250 BID and increased Keppra to 1000 BID.      Per my interview with the patient: patient is intubated and

## 2024-01-05 NOTE — ED NOTES
0835- Call to [facility] with Carmita RN to gather information regarding ICU bed status for neurology.   Carmita states she spoke with Clinical RN, Yoselin states will get ICU bed if neurology accepts pt.     0915- Call to [facility] with Elyssa RN to initiate transfer to [facility] ICU.   Dr. Jaffe requesting to speak with Neurology first and hospitalist to follow.     0944- Marla neuro NP connected to Dr. Jaffe at this time.   0945- Dr. Jaffe informed writer pt is followed with neuro at [facility].   [facility] to reach out to [facility] neuro at this time.    1018- Call to [facility] with Corinne HARRY for a re page on pt to [facility] neuro due to no callback.   RN states they will reach out at the 30 minute zuleika (1030).    1048- Aimee RN with [facility] with callback connecting Dr. Calderón ([facility] neuro) and Dr. Jaffe at this time.   Dr. Jaffe reports [facility] declined and [facility] is to page [facility] hospitalist at this time.     1110- [facility] with callback connecting Dr. Gross ([facility] hospitalist) with Dr. Jaffe.

## 2024-01-06 LAB
ALBUMIN SERPL-MCNC: 3.5 G/DL (ref 3.4–5)
ALBUMIN SERPL-MCNC: 3.5 G/DL (ref 3.4–5)
AMMONIA PLAS-SCNC: 47 UMOL/L (ref 16–60)
ANION GAP SERPL CALCULATED.3IONS-SCNC: 15 MMOL/L (ref 3–16)
ANION GAP SERPL CALCULATED.3IONS-SCNC: 20 MMOL/L (ref 3–16)
BACTERIA BLD CULT ORG #2: NORMAL
BACTERIA BLD CULT: NORMAL
BASE EXCESS BLDA CALC-SCNC: 0 MMOL/L (ref -3–3)
BASOPHILS # BLD: 0 K/UL (ref 0–0.2)
BASOPHILS NFR BLD: 0.2 %
BUN SERPL-MCNC: 5 MG/DL (ref 7–20)
BUN SERPL-MCNC: 6 MG/DL (ref 7–20)
CA-I BLD-SCNC: 1.3 MMOL/L (ref 1.12–1.32)
CALCIUM SERPL-MCNC: 9.3 MG/DL (ref 8.3–10.6)
CALCIUM SERPL-MCNC: 9.4 MG/DL (ref 8.3–10.6)
CHLORIDE SERPL-SCNC: 102 MMOL/L (ref 99–110)
CHLORIDE SERPL-SCNC: 102 MMOL/L (ref 99–110)
CO2 BLDA-SCNC: 24 MMOL/L
CO2 SERPL-SCNC: 12 MMOL/L (ref 21–32)
CO2 SERPL-SCNC: 20 MMOL/L (ref 21–32)
CREAT SERPL-MCNC: <0.5 MG/DL (ref 0.9–1.3)
CREAT SERPL-MCNC: <0.5 MG/DL (ref 0.9–1.3)
DEPRECATED RDW RBC AUTO: 12.7 % (ref 12.4–15.4)
EOSINOPHIL # BLD: 0 K/UL (ref 0–0.6)
EOSINOPHIL NFR BLD: 0 %
GFR SERPLBLD CREATININE-BSD FMLA CKD-EPI: >60 ML/MIN/{1.73_M2}
GFR SERPLBLD CREATININE-BSD FMLA CKD-EPI: >60 ML/MIN/{1.73_M2}
GLUCOSE BLD-MCNC: 116 MG/DL (ref 70–99)
GLUCOSE BLD-MCNC: 121 MG/DL (ref 70–99)
GLUCOSE SERPL-MCNC: 115 MG/DL (ref 70–99)
GLUCOSE SERPL-MCNC: 98 MG/DL (ref 70–99)
HCO3 BLDA-SCNC: 23.4 MMOL/L (ref 21–29)
HCT VFR BLD AUTO: 38.5 % (ref 40.5–52.5)
HGB BLD-MCNC: 12.8 G/DL (ref 13.5–17.5)
LACTATE BLD-SCNC: 0.8 MMOL/L (ref 0.4–2)
LACTATE BLDV-SCNC: 1.3 MMOL/L (ref 0.4–2)
LYMPHOCYTES # BLD: 0.6 K/UL (ref 1–5.1)
LYMPHOCYTES NFR BLD: 5.6 %
MAGNESIUM SERPL-MCNC: 1.8 MG/DL (ref 1.8–2.4)
MCH RBC QN AUTO: 30.3 PG (ref 26–34)
MCHC RBC AUTO-ENTMCNC: 33.2 G/DL (ref 31–36)
MCV RBC AUTO: 91.4 FL (ref 80–100)
MONOCYTES # BLD: 0.5 K/UL (ref 0–1.3)
MONOCYTES NFR BLD: 4.6 %
NEUTROPHILS # BLD: 10.2 K/UL (ref 1.7–7.7)
NEUTROPHILS NFR BLD: 89.6 %
PCO2 BLDA: 33 MM HG (ref 35–45)
PERFORMED ON: ABNORMAL
PERFORMED ON: ABNORMAL
PH BLDA: 7.46 [PH] (ref 7.35–7.45)
PHOSPHATE SERPL-MCNC: 2.3 MG/DL (ref 2.5–4.9)
PHOSPHATE SERPL-MCNC: 3 MG/DL (ref 2.5–4.9)
PLATELET # BLD AUTO: 193 K/UL (ref 135–450)
PMV BLD AUTO: 9.6 FL (ref 5–10.5)
PO2 BLDA: 127.7 MM HG (ref 75–108)
POC SAMPLE TYPE: ABNORMAL
POTASSIUM BLD-SCNC: 3.5 MMOL/L (ref 3.5–5.1)
POTASSIUM SERPL-SCNC: 3.8 MMOL/L (ref 3.5–5.1)
POTASSIUM SERPL-SCNC: 5.3 MMOL/L (ref 3.5–5.1)
PROCALCITONIN SERPL IA-MCNC: 0.98 NG/ML (ref 0–0.15)
PROCALCITONIN SERPL IA-MCNC: 1.1 NG/ML (ref 0–0.15)
RBC # BLD AUTO: 4.22 M/UL (ref 4.2–5.9)
SAO2 % BLDA: 99 % (ref 93–100)
SODIUM BLD-SCNC: 141 MMOL/L (ref 136–145)
SODIUM SERPL-SCNC: 134 MMOL/L (ref 136–145)
SODIUM SERPL-SCNC: 137 MMOL/L (ref 136–145)
VALPROATE SERPL-MCNC: 48.6 UG/ML (ref 50–100)
WBC # BLD AUTO: 11.4 K/UL (ref 4–11)

## 2024-01-06 PROCEDURE — C9113 INJ PANTOPRAZOLE SODIUM, VIA: HCPCS

## 2024-01-06 PROCEDURE — 36415 COLL VENOUS BLD VENIPUNCTURE: CPT

## 2024-01-06 PROCEDURE — 83605 ASSAY OF LACTIC ACID: CPT

## 2024-01-06 PROCEDURE — 2500000003 HC RX 250 WO HCPCS

## 2024-01-06 PROCEDURE — 6360000002 HC RX W HCPCS: Performed by: INTERNAL MEDICINE

## 2024-01-06 PROCEDURE — 2580000003 HC RX 258

## 2024-01-06 PROCEDURE — APPNB30 APP NON BILLABLE TIME 0-30 MINS

## 2024-01-06 PROCEDURE — 2700000000 HC OXYGEN THERAPY PER DAY

## 2024-01-06 PROCEDURE — 82803 BLOOD GASES ANY COMBINATION: CPT

## 2024-01-06 PROCEDURE — 80069 RENAL FUNCTION PANEL: CPT

## 2024-01-06 PROCEDURE — 99233 SBSQ HOSP IP/OBS HIGH 50: CPT | Performed by: STUDENT IN AN ORGANIZED HEALTH CARE EDUCATION/TRAINING PROGRAM

## 2024-01-06 PROCEDURE — 85025 COMPLETE CBC W/AUTO DIFF WBC: CPT

## 2024-01-06 PROCEDURE — 95716 VEEG EA 12-26HR CONT MNTR: CPT

## 2024-01-06 PROCEDURE — 84132 ASSAY OF SERUM POTASSIUM: CPT

## 2024-01-06 PROCEDURE — 99291 CRITICAL CARE FIRST HOUR: CPT | Performed by: INTERNAL MEDICINE

## 2024-01-06 PROCEDURE — 6370000000 HC RX 637 (ALT 250 FOR IP): Performed by: INTERNAL MEDICINE

## 2024-01-06 PROCEDURE — 82947 ASSAY GLUCOSE BLOOD QUANT: CPT

## 2024-01-06 PROCEDURE — 2000000000 HC ICU R&B

## 2024-01-06 PROCEDURE — 82140 ASSAY OF AMMONIA: CPT

## 2024-01-06 PROCEDURE — 83735 ASSAY OF MAGNESIUM: CPT

## 2024-01-06 PROCEDURE — 6360000002 HC RX W HCPCS: Performed by: STUDENT IN AN ORGANIZED HEALTH CARE EDUCATION/TRAINING PROGRAM

## 2024-01-06 PROCEDURE — 94761 N-INVAS EAR/PLS OXIMETRY MLT: CPT

## 2024-01-06 PROCEDURE — 82330 ASSAY OF CALCIUM: CPT

## 2024-01-06 PROCEDURE — 80164 ASSAY DIPROPYLACETIC ACD TOT: CPT

## 2024-01-06 PROCEDURE — 2580000003 HC RX 258: Performed by: INTERNAL MEDICINE

## 2024-01-06 PROCEDURE — 84145 PROCALCITONIN (PCT): CPT

## 2024-01-06 PROCEDURE — 94003 VENT MGMT INPAT SUBQ DAY: CPT

## 2024-01-06 PROCEDURE — 84295 ASSAY OF SERUM SODIUM: CPT

## 2024-01-06 PROCEDURE — 6360000002 HC RX W HCPCS

## 2024-01-06 RX ORDER — CASTOR OIL AND BALSAM, PERU 788; 87 MG/G; MG/G
OINTMENT TOPICAL 2 TIMES DAILY
Status: DISCONTINUED | OUTPATIENT
Start: 2024-01-06 | End: 2024-01-13 | Stop reason: HOSPADM

## 2024-01-06 RX ADMIN — WATER 40 MG: 1 INJECTION INTRAMUSCULAR; INTRAVENOUS; SUBCUTANEOUS at 05:54

## 2024-01-06 RX ADMIN — VALPROATE SODIUM 250 MG: 100 INJECTION, SOLUTION INTRAVENOUS at 14:07

## 2024-01-06 RX ADMIN — PROPOFOL 35 MCG/KG/MIN: 10 INJECTION, EMULSION INTRAVENOUS at 15:26

## 2024-01-06 RX ADMIN — VALPROATE SODIUM 250 MG: 100 INJECTION, SOLUTION INTRAVENOUS at 06:42

## 2024-01-06 RX ADMIN — LEVETIRACETAM 1000 MG: 500 INJECTION, SOLUTION, CONCENTRATE INTRAVENOUS at 17:00

## 2024-01-06 RX ADMIN — AMPICILLIN SODIUM, SULBACTAM SODIUM 3000 MG: 2; 1 INJECTION, POWDER, FOR SOLUTION INTRAMUSCULAR; INTRAVENOUS at 06:30

## 2024-01-06 RX ADMIN — AMPICILLIN SODIUM, SULBACTAM SODIUM 3000 MG: 2; 1 INJECTION, POWDER, FOR SOLUTION INTRAMUSCULAR; INTRAVENOUS at 01:19

## 2024-01-06 RX ADMIN — SODIUM CHLORIDE, PRESERVATIVE FREE 5 ML: 5 INJECTION INTRAVENOUS at 22:18

## 2024-01-06 RX ADMIN — WATER 40 MG: 1 INJECTION INTRAMUSCULAR; INTRAVENOUS; SUBCUTANEOUS at 17:01

## 2024-01-06 RX ADMIN — ENOXAPARIN SODIUM 40 MG: 100 INJECTION SUBCUTANEOUS at 08:04

## 2024-01-06 RX ADMIN — SODIUM CHLORIDE, POTASSIUM CHLORIDE, SODIUM LACTATE AND CALCIUM CHLORIDE 100 ML: 600; 310; 30; 20 INJECTION, SOLUTION INTRAVENOUS at 04:26

## 2024-01-06 RX ADMIN — LEVETIRACETAM 1000 MG: 500 INJECTION, SOLUTION, CONCENTRATE INTRAVENOUS at 05:54

## 2024-01-06 RX ADMIN — AMPICILLIN SODIUM, SULBACTAM SODIUM 3000 MG: 2; 1 INJECTION, POWDER, FOR SOLUTION INTRAMUSCULAR; INTRAVENOUS at 18:34

## 2024-01-06 RX ADMIN — PROPOFOL 30 MCG/KG/MIN: 10 INJECTION, EMULSION INTRAVENOUS at 06:31

## 2024-01-06 RX ADMIN — VALPROATE SODIUM 250 MG: 100 INJECTION, SOLUTION INTRAVENOUS at 22:03

## 2024-01-06 RX ADMIN — AMPICILLIN SODIUM, SULBACTAM SODIUM 3000 MG: 2; 1 INJECTION, POWDER, FOR SOLUTION INTRAMUSCULAR; INTRAVENOUS at 12:24

## 2024-01-06 RX ADMIN — PANTOPRAZOLE SODIUM 40 MG: 40 INJECTION, POWDER, FOR SOLUTION INTRAVENOUS at 08:04

## 2024-01-06 RX ADMIN — Medication: at 23:00

## 2024-01-06 ASSESSMENT — PULMONARY FUNCTION TESTS
PIF_VALUE: 21
PIF_VALUE: 19
PIF_VALUE: 13
PIF_VALUE: 18
PIF_VALUE: 19
PIF_VALUE: 11
PIF_VALUE: 18
PIF_VALUE: 18
PIF_VALUE: 21
PIF_VALUE: 11
PIF_VALUE: 15
PIF_VALUE: 11
PIF_VALUE: 36
PIF_VALUE: 11
PIF_VALUE: 11
PIF_VALUE: 12
PIF_VALUE: 15
PIF_VALUE: 17
PIF_VALUE: 16
PIF_VALUE: 14
PIF_VALUE: 15
PIF_VALUE: 16
PIF_VALUE: 15
PIF_VALUE: 15
PIF_VALUE: 11
PIF_VALUE: 11
PIF_VALUE: 14
PIF_VALUE: 12
PIF_VALUE: 19
PIF_VALUE: 11
PIF_VALUE: 20
PIF_VALUE: 12
PIF_VALUE: 11
PIF_VALUE: 19
PIF_VALUE: 14
PIF_VALUE: 17

## 2024-01-06 NOTE — OP NOTE
[place] [phone]                                OPERATIVE REPORT    PATIENT NAME: SOLO CARRENO           :        2000  MED REC NO:   [id number]                          ROOM:       4506  ACCOUNT NO:   [id number]                           ADMIT DATE: 2024  PROVIDER:     Dioni Parrish MD    DATE OF PROCEDURE:  2024    BRONCHOSCOPY PROCEDURE NOTE    :  Dioni Parrish MD    PROCEDURE:  Bronchoscopy with bronchoalveolar lavage and with  therapeutic aspiration of secretions.    PREOPERATIVE DIAGNOSES:  Acute respiratory failure, post seizure;  atelectasis, right lower lobe; mucoid impaction of bronchi.    POSTOPERATIVE DIAGNOSES:  Acute respiratory failure, post seizure;  atelectasis, right lower lobe; mucoid impaction of bronchi; acute  bronchitis.    ASA SCORE:  II.    MALLAMPATI SCORE:  Intubated.    SEDATION:  Continuous infusion of propofol.    DESCRIPTION OF THE PROCEDURE:  The patient had previously been intubated  and was maintained on mechanical ventilation.  Propofol infusion was  continued.    The fiberoptic bronchoscope was passed via adapter through the  endotracheal tube.  ETT was noted to be in good position 2-3 cm above  the main juan.  Acute inflammation was seen in the bronchial mucosa at  the level of the distal trachea, and extended into the bronchial tree  bilaterally.  Mucosal inflammation was greater on the left than the  right.  Viscous mucopurulent secretions were encountered bilaterally,  much greater degree on the left.  Secretions were cleared with saline  lavage and suctioning.  With continued saline lavage, mucous plugs were  cleared from the segmental airways in the left lower lobe.    The bronchoscope was wedged into a subsegment of the left lower lobe  basal segments, and bronchoalveolar lavage was performed with 100 mL of  warm sterile saline.

## 2024-01-06 NOTE — PROGRESS NOTES
Handoff report received, Newly admitted pt from [facility], Came in due to of seizure disorder, COVID+x 10 days, k/c/o developmental delay.     Neuro: Patient intubated/sedated with Propofol 50 mg/Hr. Does not open eyes and does not follow commands but moves extremities and head freely.    Respiratory: intubated; ET s.7.5 lvl 19, on MV. Fi02-35%  Cardio: Sinus tachycardia  GI: NPO: NG at 40 cm; noted coffee ground aspirate.  : with [place]-yellowish clear urine.  SKIN: palate is dry and cracked; earlobes and coccyx w/ redness.    Continuous EEG being set up by technician.   VS stable.     PLAN: to decrease sedation to get better Neuro assessment. Will keep closely monitored for seizure activity.

## 2024-01-06 NOTE — PROGRESS NOTES
Comprehensive Nutrition Assessment    RECOMMENDATIONS:  PO Diet: Continue NPO while intubated  If pt remains intubated 1/8, rec'd start of EN  Nutrition Education: No recommendation at this time     NUTRITION ASSESSMENT:   Nutritional summary & status: New vent.  Pt admitted d/t seizure activity- intubated d/t inability to protect airway. No pressor requirements.  Sedated on propofol (205kcal). EMR shows stable wt over >1 year.  If pt remains intubated 1/8, rec'd start of EN.   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    MALNUTRITION ASSESSMENT  Context of Malnutrition: Chronic Illness   Malnutrition Status: At risk for malnutrition (Comment)  Findings of the 6 clinical characteristics of malnutrition (Minimum of 2 out of 6 clinical characteristics is required to make the diagnosis of moderate or severe Protein Calorie Malnutrition based on AND/ASPEN Guidelines):  Energy Intake:  Mild decrease in energy intake (Comment)  Weight Loss:  No significant weight loss     Body Fat Loss:  Unable to assess     Muscle Mass Loss:  Unable to assess      NUTRITION DIAGNOSIS   Inadequate oral intake related to impaired respiratory function as evidenced by intubation    Nutrition Monitoring and Evaluation:   Food/Nutrient Intake Outcomes:  Enteral Nutrition Intake/Tolerance, Diet Advancement/Tolerance  Physical Signs/Symptoms Outcomes:  Biochemical Data, Hemodynamic Status, Nutrition Focused Physical Findings, Weight     OBJECTIVE DATA: Significant to nutrition assessment  Nutrition Related Findings: Na 134, K+ 5.3; no edema; +bm PTA  Wounds: None  Nutrition Goals: Initiate nutrition support, within 2 days     CURRENT NUTRITION THERAPIES  Diet NPO  PO Intake: NPO   PO Supplement Intake:NPO  Additional Sources of Calories/IVF:prop @ 7.8ml/h = 205kcal/d     COMPARATIVE STANDARDS  Energy (kcal):  800-1000 (20-25)     Protein (g):  57-68 (1.5-1.8)       Fluid (ml/day):   1ml/kcal or FW deficit    ANTHROPOMETRICS  Current Height: 134.6 cm (4' 5\")  Current Weight - Scale: 38 kg (83 lb 12.4 oz)    Admission weight: 38 kg (83 lb 12.4 oz)    The patient will be monitored per nutrition standards of care. Consult dietitian if additional nutrition interventions are needed prior to RD reassessment.     Miya Goodson MS, RD, LD  Toi:  [phone]  Office:  [phone]

## 2024-01-06 NOTE — PROGRESS NOTES
CONTINUOUS EEG    Name:  Jose Rodríguez  Medical Record Number:  [id number]  Age: 23 y.o.   Gender: male  : 2000  Today's Date:  2024  Room:  [address] [place] [address]  Vital Signs   BP (!) 131/94   Pulse (!) 109   Temp 98.8 °F (37.1 °C) (Axillary)   Resp (!) 33   SpO2 98%           Continuous EEG Testing Start Time:  .      Comments: Lynne mckinney [facility] contacted . Impedence on all leads are within normal limits. Today is the initial set up day for cvEEG. Patient head is NOT wrapped.Jane HERRING  is aware that this patients cvEEG will be repositioned on  at 2147. Jane HERRING was notified at 2145 by this EEG technician. Patient's head was left on blanket roll upon completion of cvEEG placement.      Plan of Care: Begin monitoring.    Electronically signed by Natalia Arreguin on 2024 at 9:52 PM

## 2024-01-06 NOTE — PLAN OF CARE
Patient intubated and sedated, will still move extremities to pain, but not following commands at this time. cEEG being placed. No changes to plan of care at this time. Please call with any questions or exam changes.    AUDRA Aggarwal - CNP   Neurology & Neurocritical Care   1/5/2024 8:15 PM    ICU Patients:   Neurocritical Care Line: [phone]  [facility]: [facility] Neurocritical Care

## 2024-01-06 NOTE — PROGRESS NOTES
Patients bilateral  pupils noted to be deviated downwards;  Patient has stable VS, pt is does not have involuntary movements.   Push button and corticare informed.   Got a call back that pt does not have any seizure activity.  NP CC informed.

## 2024-01-06 NOTE — CONSULTS
ICU CONSULT       Hospital Day:   ICU Day:                                                          Code:Full Code  Admit Date: 1/5/2024  PCP: No primary care provider on file.                                  CC: seizure    HISTORY OF PRESENT ILLNESS:   \"Jose Rodríguez is a 23 y.o. 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     Pt lives at a group home and was found this AM to be seizing. Was given 5 mg versed en route to [place] ED, but continued seizing. At the ED, pt hypertensive and tachycardic and arrived at GCS 3. Started on versed and propofol drip, and given loading dose of keppra at the ED. Had episode of vomiting with concern for aspiration, received 1x dose cefepime 2g and vancomycin 1.25 g. Pt was intubated and transferred to [facility] for further neurological monitoring given availability of cEEG. Recently COVID positive.     Prior hx significant for recent admission for seizure disorder (10/29-11/2) in the setting of sepsis from likely pneumonia (suspected community-acquired). Neurology was consulted during the admission and pt was decreased in depakote to 250 mg BID (originally 500 mg BID) and increased in keppra to 1000 mg BID (originally 800 mg BID) based on blood levels. Mother refused EEG during the admission. Pt has not been seen by Neurology since discharge from that admission; was instructed to follow-up with outpatient  neurologist. Was previously seen by Neurology at [facility] (last seen on 10/3/22). Prior to the admission, pt's mother denies any known seizure activity for 10 years and that he has been compliant with his home depakote and keppra. Of note, pt was recently transferred to a group health home in August 2023 as his mother has been physically unable to meet all of his needs at this point. \"    PAST HISTORY:     Past Medical History:   Diagnosis Date       Pulmonary  Intubated for airway protection & respiratory distress  Vent Settings: Vent Mode: AC/PRVC Resp Rate (Set): 16 bpm/Vt (Set, mL): 300 mL/ /FiO2 : 29 % / PEEP 5 / SpO2 100    - patient has minimal ET secretions but a lot of oropharyngeal secretions  - ABG pending      Suspected aspiration pneumonia with mucus plugging  Vomiting episode noted on chart review during seizure. CT chest concerning for RLL consolidation concerning for possible mucus plug.   - Unasyn 3g q6h ordered  -  mL/hr  - bronchoscopy culture pending   - Blood cultures pending  - MRSA swab pending   - Dr. Parrish did a bronchoscopy acute inflammation was seen in the bronchial mucosa at the level of the distal trachea and extending into the bronchial tree bilaterally. Mucus plugs were cleared from the segmental airways in the left lower lobe.      COVID positive  Initial COVID test positive on 12/27.  - Supportive treatment as needed     Cardiovascular  No acute concerns at this time     GI  Diet: Diet NPO  GI ppx: Pantoprazole 40 mg IV qd     Renal   No ongoing concerns       No ongoing concerns     Heme  No ongoing concerns      Code Status:Full Code  FEN: Diet NPO   PPX:  lovenox  DISPO: ICU    This patient has been staffed and discussed with Phyllis Marquez MD.   -----------------------------  Naty Sanchez MD, PGY-1  Internal Medicine Resident  1/6/2024  2:58 PM  Patient examined, findings as discussed with Dr. Sanchez.  Agree with assessment and plan.  Respiratory failure associated with acute seizure likely secondary to aspiration.  He has diffuse airways inflammation, left basilar atelectasis and mucoid impaction of airways.  Maintained on mechanical ventilation support with minute volume 7-8 L, FiO2 30%.  He has mild respiratory alkalosis.  Gas exchange is good, with pO2/FiO2 ratio 400.  He is on empiric antibiotic therapy for aspiration, and glucocorticoid for airways inflammation.  No further seizure activity recorded, on

## 2024-01-06 NOTE — PLAN OF CARE
Monitor for seizure activity.  If seizure occurs, document type and location of movements and any associated apnea   If seizure occurs, turn head to side and suction secretions as needed   Administer anticonvulsants as ordered   Support airway/breathing, administer oxygen as needed   Diagnostic studies as ordered  Goal: Remains free of injury related to seizures activity  Outcome: Progressing  Flowsheets (Taken 1/5/2024 2000)  Remains free of injury related to seizure activity:   Maintain airway, patient safety  and administer oxygen as ordered   Monitor patient for seizure activity, document and report duration and description of seizure to Licensed Independent Practitioner   If seizure occurs, turn patient to side and suction secretions as needed   Reorient patient post seizure   Seizure pads on all 4 side rails   Instruct patient/family to notify RN of any seizure activity   Instruct patient/family to call for assistance with activity based on assessment  Goal: Achieves maximal functionality and self care  Outcome: Progressing  Flowsheets (Taken 1/5/2024 2000)  Achieves maximal functionality and self care:   Monitor swallowing and airway patency with patient fatigue and changes in neurological status   Encourage and assist patient to increase activity and self care with guidance from physical therapy/occupational therapy   Encourage visually impaired, hearing impaired and aphasic patients to use assistive/communication devices     Problem: Respiratory - Adult  Goal: Achieves optimal ventilation and oxygenation  Outcome: Progressing     Problem: Skin/Tissue Integrity - Adult  Goal: Skin integrity remains intact  Outcome: Progressing  Flowsheets  Taken 1/6/2024 0100  Skin Integrity Remains Intact:   Monitor for areas of redness and/or skin breakdown   Assess vascular access sites hourly   Every 4-6 hours minimum: Change oxygen saturation probe site  Taken 1/5/2024 2000  Skin Integrity Remains Intact:   Monitor for  hospitalization  Outcome: Progressing  Goal: Absence of fever/infection during anticipated neutropenic period  Outcome: Progressing     Problem: Safety - Adult  Goal: Free from fall injury  Outcome: Progressing  Flowsheets (Taken 1/6/2024 0100)  Free From Fall Injury: Instruct family/caregiver on patient safety     Problem: Pain  Goal: Verbalizes/displays adequate comfort level or baseline comfort level  Outcome: Progressing     Problem: Skin/Tissue Integrity  Goal: Absence of new skin breakdown  Description: 1.  Monitor for areas of redness and/or skin breakdown  2.  Assess vascular access sites hourly  3.  Every 4-6 hours minimum:  Change oxygen saturation probe site  4.  Every 4-6 hours:  If on nasal continuous positive airway pressure, respiratory therapy assess nares and determine need for appliance change or resting period.  Outcome: Progressing     Problem: ABCDS Injury Assessment  Goal: Absence of physical injury  Outcome: Progressing

## 2024-01-06 NOTE — PROGRESS NOTES
Neurocritical Care Progress Note    Patient: Jose Rodríguez MRN: [id number]    YOB: 2000  Age: 23 y.o.  Sex: male   Unit: [facility] ICU TOWER Room/Bed: 4506/4506-01 Location: [facility]'s Date: 1/6/2024  Date of Admission: 1/5/2024  3:16 PM  Admitting Physician: CYNTHIA CALABRESE    Primary Care Physician: No primary care provider on file.          LOS: 1 day      ASSESSMENT & RECOMMENDATIONS     Assessment  23 M with PMH of seizure disorder who presented with SE in the setting of COVID. Infection appeared to be a trigger for his SZ breakthrough. The last breakthrough SZ he had at the time he had PNA  His VPA is subtherapeutic 43 (  BID) during admission, which could also be the contributor  EEG negative for seizure, + FIRDA, [id number] score 2     Recommendations  Continue Keppra 1000BID  Continue 250 TID  Daily ammonia and VPA levels   cEEG till tomorrow AM  Neuro q2      SUBJECTIVE     Chart reviewed, events noted, pt seen & examined. No acute events overnight. Afebrile.     Review of Systems  No changes since pt last seen other than those noted above.    PFSH  No change since the original history and note.     OBJECTIVE     Patient Vitals for the past 24 hrs:   BP Temp Temp src Pulse Resp SpO2 Weight   01/06/24 1000 (!) 156/113 -- -- 93 23 99 % --   01/06/24 0945 (!) 146/105 -- -- 94 21 -- --   01/06/24 0930 (!) 150/102 -- -- 96 18 -- --   01/06/24 0915 (!) 152/109 -- -- (!) 102 26 -- --   01/06/24 0900 (!) 150/112 -- -- (!) 102 21 -- --   01/06/24 0845 (!) 137/101 -- -- (!) 110 24 -- --   01/06/24 0830 (!) 143/107 -- -- 98 24 -- --   01/06/24 0815 (!) 133/94 -- -- 88 21 -- --   01/06/24 0813 -- -- -- 87 26 99 % --   01/06/24 0800 (!) 154/104 98.8 °F (37.1 °C) Axillary 95 25 100 % --   01/06/24 0745 132/89 -- -- 88 21 -- --   01/06/24 0730 (!) 131/93 -- -- 92 24 -- --   01/06/24 0715 (!) 139/98 -- -- 94 20 -- --   01/06/24 0700 113/76 -- -- 85 21 100 % --  Negative mg/dL    Specific Gravity, UA 1.020 1.005 - 1.030    Blood, Urine TRACE-INTACT (A) Negative    pH, UA 7.0 5.0 - 8.0    Protein, UA Negative Negative mg/dL    Urobilinogen, Urine 0.2 <2.0 E.U./dL    Nitrite, Urine Negative Negative    Leukocyte Esterase, Urine Negative Negative    Microscopic Examination YES     Urine Type NotGiven     Urine Reflex to Culture Not Indicated    Drug screen multi urine    Collection Time: 01/05/24  7:48 AM   Result Value Ref Range    Amphetamine Screen, Urine Neg Negative <1000ng/mL    Barbiturate Screen, Ur Neg Negative <200 ng/mL    Benzodiazepine Screen, Urine POSITIVE (A) Negative <200 ng/mL    Cannabinoid Scrn, Ur Neg Negative <50 ng/mL    Cocaine Metabolite Screen, Urine Neg Negative <300 ng/mL    Opiate Scrn, Ur Neg Negative <300 ng/mL    PCP Screen, Urine Neg Negative <25 ng/mL    Methadone Screen, Urine Neg Negative <300 ng/mL    Oxycodone Urine Neg Negative <100 ng/ml    FENTANYL SCREEN, URINE Neg Negative <5 ng/mL    pH, UA 7.0     Drug Screen Comment: see below    Microscopic Urinalysis    Collection Time: 01/05/24  7:48 AM   Result Value Ref Range    Hyaline Casts, UA 0-2 0 - 2 /LPF    WBC, UA 0-2 0 - 5 /HPF    RBC, UA 3-4 0 - 4 /HPF    Renal Epithelial, UA 2-5 (A) 0 - 1 /HPF   Ammonia    Collection Time: 01/05/24  7:55 AM   Result Value Ref Range    Ammonia 48 16 - 60 umol/L   Blood gas, venous    Collection Time: 01/05/24  7:55 AM   Result Value Ref Range    pH, Dann 7.206 (L) 7.350 - 7.450    pCO2, Dann 56.6 (H) 40.0 - 50.0 mmHg    pO2, Dann 44.4 (H) 25.0 - 40.0 mmHg    HCO3, Venous 21.9 (L) 23.0 - 29.0 mmol/L    Base Excess, Dann -6.9 (L) -3.0 - 3.0 mmol/L    O2 Sat, Dann 70 Not Established %    Carboxyhemoglobin 1.4 0.0 - 1.5 %    MetHgb, Dann 0.3 <1.5 %    TC02 (Calc), Dann 24 Not Established mmol/L    O2 Content, Dann 16 Not Established VOL %    O2 Therapy Unknown    Potassium w/ Reflex to Magnesium    Collection Time: 01/05/24  8:00 AM   Result Value Ref Range

## 2024-01-06 NOTE — PROGRESS NOTES
V2.0    [facility] Progress Note      Name:  Jose Rodríguez /Age/Sex: 2000  (23 y.o. male)   MRN & CSN:  [id number] & [id number] Encounter Date/Time: 2024 9:01 AM EST   Location:  [facility]- PCP: No primary care provider on file.     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  Patient was intubated for airway protection.  Was admitted with status epilepticus with aspiration pneumonia.    Plan:     Seizures  -Neurocritical care consulted, consult appreciated  -Continue IV Keppra  -Continue current Depakote dose i3 times daily.  Depakote level low on admission  -Continue propofol drip  -EEG noted  -Continue neurocheck     Aspiration pneumonia with mucous plugging  -Start Unasyn d2  -S/p bronchoscopy on 2024.  Bronchoscopy culture pending  -CT chest shows mucous plugging with resultant middle and lower lobe with airspace disease on admission  -Procalcitonin elevated     Sepsis -present on admission-slowly improving  -With tachypnea leukocytosis and tachycardia  -IV fluids  -Blood cultures pending        Recent COVID infection  -Diagnosed on 10/27  -Positive on admission     Developmental elevated NP partial trisomy  -Lives in a group home at baseline alert does not walk    Goals of care discussed with sister.  Patient is a full code    Diet Diet NPO   DVT Prophylaxis [x] Lovenox, []  Heparin, [] SCDs, [] Ambulation,  [] Eliquis, [] Xarelto  [] Coumadin   Code Status Full Code   Disposition From: Group home  Expected Disposition: Group home  Estimated Date of Discharge: 2 to 3 days  Patient requires continued admission due to intubated   Surrogate Decision Maker/ POA       Personally reviewed Lab Studies and Imaging     Discussed management of the case with

## 2024-01-06 NOTE — PROCEDURES
CONTINUOUS VIDEO ELECTROENCEPHALOGRAM (cvEEG) REPORT    Identifying Information:  Name: Jose Rodríguez  MRN: [id number]  : 2000  Attending Physician: Phyllis Marquez MD  Interpreting Physician: Jan Castorena MD  Reporting Physician: JAN CASTORENA MD,     Clinical History:  Jose Rodríguez is an 23 y.o. 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     Seizures (HCC)     Wheelchair dependent        Current Medications:    pantoprazole  40 mg IntraVENous Daily    sodium chloride flush  5-40 mL IntraVENous 2 times per day    enoxaparin  40 mg SubCUTAneous Daily    methylPREDNISolone  40 mg IntraVENous Q12H    levETIRAcetam  1,000 mg IntraVENous Q12H    ampicillin-sulbactam  3,000 mg IntraVENous Q6H    valproate (DEPACON) 250 mg in sodium chloride 0.9 % 100 mL IVPB  250 mg IntraVENous Q8H       cEEG start date & time: 2024 at 9:40 pm  cEEG end date & time: 2024 at 7 am    Indication:  cEEG was initiated for monitoring and localization of seizures as the etiology of the altered mental status. It was available for review at the bedside as well as via remote access for the entire period of monitoring.    Technical Summary:  32 channels of continuous EEG and video were recorded in a digital format on a patient who is reported to be encephalopathic during the recording.     The background rhythm consists of 3-4 Hz activity in the delta frequency range. Excess beta activity seen at times. No well formed PDR, no state changes seen    The recording is remarkable for the presence of:   Frontal intermittent rhythmic delta activity (FIRDA) seen     During the recording:   Video was reviewed intermittently at times when significant amounts of EMG artifact were present.    The EKG monitoring channel did not detect  any significant rhythm abnormalities.    EEG Interpretation:   This EEG is abnormal due to the presence of:     Severe generalized slowing. This is a non-specific finding but is consistent with a generalized disturbance of cerebral function. It may be seen in a variety of conditions, such as toxic, metabolic, post-anoxic, multi-focal, or diffuse structural abnormalities.       Frontal intermittent rhythmic delta activity (FIRDA). This activity is a non-specific finding that can be secondary to a variety of etiologies including toxic, metabolic, post-hypoxic, and degenerative, as well as in the setting of deep midline structural abnormalities.         Clinical correlation is recommended.         JAN ARMSTRONG MD,   1/6/2024  8:01 AM

## 2024-01-06 NOTE — PROGRESS NOTES
CONTINUOUS EEG    Name:  Jose Rodríguez  Medical Record Number:  [id number]  Age: 23 y.o.   Gender: male  : 2000  Today's Date:  2024  Room:  [address] [place] [address]  Vital Signs   /87   Pulse 83   Temp 98.4 °F (36.9 °C) (Axillary)   Resp 21   Ht 1.346 m (4' 5\")   Wt 38 kg (83 lb 12.4 oz)   SpO2 99%   BMI 20.97 kg/m²       Patient currently on continuous EEG monitoring.  All EEG leads are currently in place with no current issues.  Verified [facility] connection via team viewer.  Checked in with patient RN for current plan of care.    Comments: Continue monitoring. All leads within 10K limit. Today is day 1 of cvEEG.    Electronically signed by Natalia Arreguin on 2024 at 4:46 PM

## 2024-01-07 ENCOUNTER — APPOINTMENT (OUTPATIENT)
Dept: GENERAL RADIOLOGY | Age: 24
End: 2024-01-07
Attending: INTERNAL MEDICINE
Payer: MEDICARE

## 2024-01-07 LAB
ALBUMIN SERPL-MCNC: 3.4 G/DL (ref 3.4–5)
AMMONIA PLAS-SCNC: 67 UMOL/L (ref 16–60)
ANION GAP SERPL CALCULATED.3IONS-SCNC: 15 MMOL/L (ref 3–16)
BASOPHILS # BLD: 0.1 K/UL (ref 0–0.2)
BASOPHILS NFR BLD: 0.7 %
BUN SERPL-MCNC: 13 MG/DL (ref 7–20)
CALCIUM SERPL-MCNC: 9.5 MG/DL (ref 8.3–10.6)
CHLORIDE SERPL-SCNC: 105 MMOL/L (ref 99–110)
CO2 SERPL-SCNC: 18 MMOL/L (ref 21–32)
CREAT SERPL-MCNC: <0.5 MG/DL (ref 0.9–1.3)
DEPRECATED RDW RBC AUTO: 12.9 % (ref 12.4–15.4)
EOSINOPHIL # BLD: 0 K/UL (ref 0–0.6)
EOSINOPHIL NFR BLD: 0 %
GFR SERPLBLD CREATININE-BSD FMLA CKD-EPI: >60 ML/MIN/{1.73_M2}
GLUCOSE SERPL-MCNC: 110 MG/DL (ref 70–99)
HCT VFR BLD AUTO: 35.7 % (ref 40.5–52.5)
HGB BLD-MCNC: 12.2 G/DL (ref 13.5–17.5)
LYMPHOCYTES # BLD: 0.4 K/UL (ref 1–5.1)
LYMPHOCYTES NFR BLD: 4.3 %
MCH RBC QN AUTO: 31.2 PG (ref 26–34)
MCHC RBC AUTO-ENTMCNC: 34.3 G/DL (ref 31–36)
MCV RBC AUTO: 91 FL (ref 80–100)
MONOCYTES # BLD: 0.4 K/UL (ref 0–1.3)
MONOCYTES NFR BLD: 4.4 %
MRSA DNA SPEC QL NAA+PROBE: ABNORMAL
NEUTROPHILS # BLD: 9.2 K/UL (ref 1.7–7.7)
NEUTROPHILS NFR BLD: 90.6 %
ORGANISM: ABNORMAL
PHOSPHATE SERPL-MCNC: 3.1 MG/DL (ref 2.5–4.9)
PLATELET # BLD AUTO: 202 K/UL (ref 135–450)
PMV BLD AUTO: 9.7 FL (ref 5–10.5)
POTASSIUM SERPL-SCNC: 3.8 MMOL/L (ref 3.5–5.1)
RBC # BLD AUTO: 3.92 M/UL (ref 4.2–5.9)
SODIUM SERPL-SCNC: 138 MMOL/L (ref 136–145)
VALPROATE SERPL-MCNC: 78 UG/ML (ref 50–100)
WBC # BLD AUTO: 10.1 K/UL (ref 4–11)

## 2024-01-07 PROCEDURE — 94761 N-INVAS EAR/PLS OXIMETRY MLT: CPT

## 2024-01-07 PROCEDURE — 2000000000 HC ICU R&B

## 2024-01-07 PROCEDURE — 2500000003 HC RX 250 WO HCPCS

## 2024-01-07 PROCEDURE — 82803 BLOOD GASES ANY COMBINATION: CPT

## 2024-01-07 PROCEDURE — 94003 VENT MGMT INPAT SUBQ DAY: CPT

## 2024-01-07 PROCEDURE — 2580000003 HC RX 258

## 2024-01-07 PROCEDURE — 80164 ASSAY DIPROPYLACETIC ACD TOT: CPT

## 2024-01-07 PROCEDURE — 2700000000 HC OXYGEN THERAPY PER DAY

## 2024-01-07 PROCEDURE — 6360000002 HC RX W HCPCS: Performed by: STUDENT IN AN ORGANIZED HEALTH CARE EDUCATION/TRAINING PROGRAM

## 2024-01-07 PROCEDURE — 2580000003 HC RX 258: Performed by: INTERNAL MEDICINE

## 2024-01-07 PROCEDURE — 71045 X-RAY EXAM CHEST 1 VIEW: CPT

## 2024-01-07 PROCEDURE — 6360000002 HC RX W HCPCS

## 2024-01-07 PROCEDURE — 80069 RENAL FUNCTION PANEL: CPT

## 2024-01-07 PROCEDURE — APPNB30 APP NON BILLABLE TIME 0-30 MINS

## 2024-01-07 PROCEDURE — 99233 SBSQ HOSP IP/OBS HIGH 50: CPT | Performed by: STUDENT IN AN ORGANIZED HEALTH CARE EDUCATION/TRAINING PROGRAM

## 2024-01-07 PROCEDURE — C9113 INJ PANTOPRAZOLE SODIUM, VIA: HCPCS

## 2024-01-07 PROCEDURE — 82140 ASSAY OF AMMONIA: CPT

## 2024-01-07 PROCEDURE — 99291 CRITICAL CARE FIRST HOUR: CPT | Performed by: INTERNAL MEDICINE

## 2024-01-07 PROCEDURE — 95716 VEEG EA 12-26HR CONT MNTR: CPT

## 2024-01-07 PROCEDURE — 85025 COMPLETE CBC W/AUTO DIFF WBC: CPT

## 2024-01-07 PROCEDURE — 6360000002 HC RX W HCPCS: Performed by: INTERNAL MEDICINE

## 2024-01-07 RX ADMIN — LEVETIRACETAM 1000 MG: 500 INJECTION, SOLUTION, CONCENTRATE INTRAVENOUS at 17:22

## 2024-01-07 RX ADMIN — VALPROATE SODIUM 250 MG: 100 INJECTION, SOLUTION INTRAVENOUS at 14:19

## 2024-01-07 RX ADMIN — AMPICILLIN SODIUM, SULBACTAM SODIUM 3000 MG: 2; 1 INJECTION, POWDER, FOR SOLUTION INTRAMUSCULAR; INTRAVENOUS at 13:44

## 2024-01-07 RX ADMIN — LEVETIRACETAM 1000 MG: 500 INJECTION, SOLUTION, CONCENTRATE INTRAVENOUS at 05:43

## 2024-01-07 RX ADMIN — VALPROATE SODIUM 250 MG: 100 INJECTION, SOLUTION INTRAVENOUS at 05:45

## 2024-01-07 RX ADMIN — VALPROATE SODIUM 250 MG: 100 INJECTION, SOLUTION INTRAVENOUS at 22:22

## 2024-01-07 RX ADMIN — Medication: at 22:23

## 2024-01-07 RX ADMIN — Medication: at 08:09

## 2024-01-07 RX ADMIN — ENOXAPARIN SODIUM 40 MG: 100 INJECTION SUBCUTANEOUS at 08:08

## 2024-01-07 RX ADMIN — PANTOPRAZOLE SODIUM 40 MG: 40 INJECTION, POWDER, FOR SOLUTION INTRAVENOUS at 08:09

## 2024-01-07 RX ADMIN — AMPICILLIN SODIUM, SULBACTAM SODIUM 3000 MG: 2; 1 INJECTION, POWDER, FOR SOLUTION INTRAMUSCULAR; INTRAVENOUS at 06:51

## 2024-01-07 RX ADMIN — SODIUM CHLORIDE, PRESERVATIVE FREE 5 ML: 5 INJECTION INTRAVENOUS at 22:23

## 2024-01-07 RX ADMIN — AMPICILLIN SODIUM, SULBACTAM SODIUM 3000 MG: 2; 1 INJECTION, POWDER, FOR SOLUTION INTRAMUSCULAR; INTRAVENOUS at 18:36

## 2024-01-07 RX ADMIN — AMPICILLIN SODIUM, SULBACTAM SODIUM 3000 MG: 2; 1 INJECTION, POWDER, FOR SOLUTION INTRAMUSCULAR; INTRAVENOUS at 00:44

## 2024-01-07 RX ADMIN — WATER 40 MG: 1 INJECTION INTRAMUSCULAR; INTRAVENOUS; SUBCUTANEOUS at 05:37

## 2024-01-07 RX ADMIN — PROPOFOL 25 MCG/KG/MIN: 10 INJECTION, EMULSION INTRAVENOUS at 02:42

## 2024-01-07 ASSESSMENT — PAIN SCALES - GENERAL
PAINLEVEL_OUTOF10: 0

## 2024-01-07 ASSESSMENT — PULMONARY FUNCTION TESTS
PIF_VALUE: 17
PIF_VALUE: 10
PIF_VALUE: 18
PIF_VALUE: 19
PIF_VALUE: 14
PIF_VALUE: 16
PIF_VALUE: 15

## 2024-01-07 NOTE — PLAN OF CARE
Patient intubated and sedated, will still move extremities to pain, but not following commands at this time. cEEG neg so far.   No changes to plan of care at this time. Please call with any questions or exam changes.    AUDRA Aggarwal - CNP   Neurology & Neurocritical Care   1/6/2024 8:14 PM    ICU Patients:   Neurocritical Care Line: [phone]  [employer]: [facility] Neurocritical Care

## 2024-01-07 NOTE — PROGRESS NOTES
Called lab 2nd time to ff up blood draws for morning labs with reply that they will come later. Morning nurse informed.     MRSA nares resulted + per [facility] lab. RN margie informed.

## 2024-01-07 NOTE — PROGRESS NOTES
ICU Progress Note    Admit Date: 1/5/2024  Day: 3  Vent Day: None  IV Access:Peripheral  IV Fluids:None  Vasopressors:None                Antibiotics: None  Diet: Diet NPO    CC: seizure    Interval history:   No acute events overnight. FiO2 stable at 30%. Has had oral secretions, but no significant ETT secretions. Withdraws to non-painful stimuli. Continues to have cEEG monitoring, no noted seizure activity. Vital signs overall stable, will attempt SBT today.    HPI:   Jose Rodríguez is a 23 y.o. 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     Pt lives at a group home and was found this AM to be seizing. Was given 5 mg versed en route to [place] ED, but continued seizing. At the ED, pt hypertensive and tachycardic and arrived at [facility]. Started on versed and propofol drip, and given loading dose of keppra at the ED. Had episode of vomiting with concern for aspiration, received 1x dose cefepime 2g and vancomycin 1.25 g. Pt was intubated and transferred to [facility] for further neurological monitoring given availability of cEEG. Recently COVID positive.     Prior hx significant for recent admission for seizure disorder (10/29-11/2) in the setting of sepsis from likely pneumonia (suspected community-acquired). Neurology was consulted during the admission and pt was decreased in depakote to 250 mg BID (originally 500 mg BID) and increased in keppra to 1000 mg BID (originally 800 mg BID) based on blood levels. Mother refused EEG during the admission. Pt has not been seen by Neurology since discharge from that admission; was instructed to follow-up with outpatient  neurologist. Was previously seen by Neurology at [facility] (last seen on 10/3/22). Prior to the admission, pt's mother denies any known seizure activity for 10 years and that he has been compliant with his home depakote and keppra. Of note, pt was

## 2024-01-07 NOTE — PROGRESS NOTES
Patient has been successfully weaned from Mechanical Ventilation.  RSBI before extubation was 26 with EtCO2 of 27 and SpO2 of 100 on 30% FiO2.  Patient extubated and placed on 2 liters/min via nasal cannula.  Post extubation SpO2 is 98% with HR  95 bpm and RR 21 breaths/min.  Patient had mild cough that was non-productive.  Extubation Well tolerated by patient..

## 2024-01-07 NOTE — PROCEDURES
CONTINUOUS VIDEO ELECTROENCEPHALOGRAM (cvEEG) REPORT    Identifying Information:  Name: Jose Rodríguez  MRN: [id number]  : 2000  Attending Physician: Phyllis Marquez MD  Interpreting Physician: Jan Castorena MD  Reporting Physician: JAN CASTORENA MD,     Clinical History:  Jose Rodríguez is an 23 y.o. 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     Seizures (HCC)     Wheelchair dependent        Current Medications:    balsum peru-castor oil   Topical BID    pantoprazole  40 mg IntraVENous Daily    sodium chloride flush  5-40 mL IntraVENous 2 times per day    enoxaparin  40 mg SubCUTAneous Daily    methylPREDNISolone  40 mg IntraVENous Q12H    levETIRAcetam  1,000 mg IntraVENous Q12H    ampicillin-sulbactam  3,000 mg IntraVENous Q6H    valproate (DEPACON) 250 mg in sodium chloride 0.9 % 100 mL IVPB  250 mg IntraVENous Q8H       cEEG start date & time: 2024 at 7 am  cEEG end date & time: 2024 at 7 am    Indication:  cEEG was initiated for monitoring and localization of seizures as the etiology of the altered mental status. It was available for review at the bedside as well as via remote access for the entire period of monitoring.    Technical Summary:  32 channels of continuous EEG and video were recorded in a digital format on a patient who is reported to be encephalopathic during the recording.     The background rhythm consists of 1-3 Hz activity in the delta frequency range. Excess beta activity seen at times. No well formed PDR, no state changes seen    The recording is remarkable for the presence of:   Frontal intermittent rhythmic delta activity (FIRDA) seen     During the recording:   Video was reviewed intermittently at times when significant amounts of EMG artifact were present.    The

## 2024-01-07 NOTE — PROGRESS NOTES
Labs not drawn yet this AM. RN attempted but unable. Lab called and stated he is on their list and they will come as soon as possible.

## 2024-01-07 NOTE — PROGRESS NOTES
MECHANICAL VENTILATION WEANING PROTOCOL    PRE-TRIAL PATIENT ASSESSMENT - COMPLETED AT 1145    Ventilatory Assessment:    PARAMETER CRITERIA FOR WEANING   Spontaneous Cough:  Yes    Sputum Characteristics:          SPONTANEOUS COUGH With small to moderate  Amount of secretions   FiO2 : 29 % FIO2 less than or equal to 50%     PEEP less than or equal to 8   Progressive Mobility Protocol  No     ABG:  Lab Results   Component Value Date/Time    PHART 7.459 01/06/2024 02:14 PM    VQM3XAR 33.0 01/06/2024 02:14 PM    PO2ART 127.7 01/06/2024 02:14 PM    F8XLUFBI 99 01/06/2024 02:14 PM    RHD4IPP 23.4 01/06/2024 02:14 PM    BEART 0 01/06/2024 02:14 PM     HGB/WBC:  Lab Results   Component Value Date/Time    HGB 12.2 01/07/2024 06:28 AM    WBC 10.1 01/07/2024 06:28 AM        Vital Signs:    PARAMETER CRITERIA FOR WEANING Meets Criteria   Pulse: 62 Within patient's normal limits / stable Yes   Respirations: 17 Less than or equal to 30 Yes   BP: 134/89 Within patient's normal limits / minimal pressors (Hemodynamically Stable) Yes   SpO2: 98 % Greater than or equal to 90% Yes   End Tidal CO2/tcpCO2: 26 (%) Within patient's normal limits Yes   Temp: 98 °F (36.7 °C) Less than 38.5oC / 101.3oF Yes     [x]    Based on this assessment and the  Ventilator Weaning Protocol, this patient  IS being placed on a Spontaneous Breathing Trial (SBT) at this time.  []    Based on this assessment and the  Ventilator Weaning Protocol, this patient  IS NOT being placed on a Spontaneous Breathing Trial (SBT) at this time.  []    Patient  IS NOT being placed on a Spontaneous Breathing Trial (SBT) at this time because of factors not previously addressed.  Those factors include      Vital Capacity (VC) = 0.310    Maximum Inspiratory Force (MIF) = -12    SBT - Initiated at  1145    Ventilator Settings:  CPAP - 5 cmH2O, PS - 10 cmH2O(if using settings other than CPAP 5/PS 8, please explain reasons for settings here):  per Dr Parrish     1 Minute SBT  Respiratory Parameters:   VE: 7.2 L   RR: 16 b/m   VT: 445 mL (average VT = VE/RR)   RSBI: 38 (RR/VT in liters)   ETCO2: 28 cmH2O   SPO2: 99 %   If on sedation, amount and type none    [x]   RR is less than 35, RSBI is less than 100, patient's vitals signs are stable, and patient is in no apparent distress; therefore, patient is being left on SBT for up to 1 hour.  []   RR is greater than 35, RSBI is greater than 100, VS's are unstable, or patient is in distress; therefore, patient is being placed back on previous settings.      SBT - Concluded at  ***.    Weaning Parameters/VS's at conclusion of SBT:   VE: *** L   RR: *** b/m   VT: *** mL (average VT = VE/RR)   RSBI: *** (RR/VT in liters)   ETCO2: *** cmH2O   SPO2: *** %    If on sedation, amount and type ***    []   Patient tolerated SBT for full 60 minutes with acceptable weaning parameters and vital signs and showed no signs or distress.  []   Patient tolerated SBT for full 60 minutes, but had unacceptable weaning parameters or vital signs, and/or signs of distress.  []   Patient was unable to tolerate SBT for 60 minutes and was placed back on previous settings.            COMMENTS:

## 2024-01-07 NOTE — PROGRESS NOTES
V2.0    [facility] [place]      Name:  Jose Rodríguez /Age/Sex: 2000  (23 y.o. male)   MRN & CSN:  [id number] & [id number] Encounter Date/Time: 2024 9:01 AM EST   Location:  [facility]- PCP: No primary care provider on file.     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  Patient was intubated for airway protection.  Was admitted with status epilepticus with aspiration pneumonia.    Plan:     Seizures  -Neurocritical care consulted, consult appreciated  -Continue IV Keppra  -Continue current Depakote dose 3 times daily.  Depakote level low on admission  -Continue propofol drip, hold prior to extubation  -EEG noted, no seizures  -Continue neurocheck  -Ammonia level mildly elevated  -Intubated for airway protection.  Vent management per pulmonology.  Possible extubation today     Aspiration pneumonia with mucous plugging  -Start Unasyn d3  -S/p bronchoscopy on 2024.  Bronchoscopy culture pending, no organism  -CT chest shows mucous plugging with resultant middle and lower lobe with airspace disease on admission  -Procalcitonin elevated  -MRSA swab is positive and patient was recently admitted to the hospital but given his BAL culture is no organisms and patient is improving with Unasyn we will hold off vancomycin     Sepsis -present on admission-slowly improving, WBC trending down  -With tachypnea leukocytosis and tachycardia  -IV fluids  -Blood cultures NGTD        Recent COVID infection  -Diagnosed on 10/27  -Positive on admission     Developmental elevated NP partial trisomy  -Lives in a group home at baseline alert does not walk    Goals of care discussed with sister.  Patient is a full code    Diet Diet NPO   DVT Prophylaxis [x] Lovenox, []  Heparin, [] SCDs,  question aspiration FINDINGS: Chest: Mediastinum: Heart size is normal. Aorta and pulmonary arteries are normal. Superior mediastinum is normal. No lymph node enlargement within the chest. The esophagus tapers normally. Lungs/pleura: No endotracheal tube is positioned within the proximal aspect of the right mainstem bronchus.  Recommend retraction 3 cm.  There is peripheral mucoid impaction within the right lower lobar bronchus and to a lesser degree the bronchus intermedius with dense opacification in volume loss in the right middle and lower lobes.  Scattered airspace opacities are also seen in the right upper lobe with additional central airway opacification.  Left lung clear. Soft Tissues/Bones: Advanced S-shaped scoliosis of the thoracolumbar spine with Arguello rods identified from prior surgical fixation.  No acute skeletal finding in the chest. Abdomen/Pelvis: Organs: The liver, gallbladder, biliary ducts, pancreas and spleen are normal. Kidneys and adrenal glands are normal. GI/Bowel: Enteric tube is appropriately positioned in the lumen of the stomach.  Duodenum and small bowel are normal.  The colon is normal. Pelvis: Durham catheter in the lumen of the bladder.  Mucosal thickening and enhancement of the bladder.  Stable cystic change in the prostate gland.  No enlargement. Peritoneum/Retroperitoneum: The aorta tapers normally.  No lymph node enlargement. Bones/Soft Tissues: Scoliosis of the thoracolumbar spine is once again noted. No acute skeletal finding.     1. Endotracheal tube extends into the right mainstem bronchus.  Retraction 3 cm recommended. 2. Mucoid impaction within the airways in the right lung with significant atelectasis of the right middle and lower lobes and scattered airspace changes in the upper lobe. 3. Bladder mucosal thickening may indicate underlying cystitis.  A Durham catheter is present. 4. Scoliosis of the thoracolumbar spine with prior surgical change that is stable.     CT

## 2024-01-07 NOTE — PROGRESS NOTES
Patients neuro status stable; opens eyes spontaneously, does not follow commands, intermittently focuses eyes on nurse. No seizures, no call from corticare; EEG leads in place. On Propofol 25 mg, with involuntary movts but not aggressive.   Will continue to monitor pt closely.

## 2024-01-07 NOTE — PLAN OF CARE
oxygen saturation probe site  4.  Every 4-6 hours:  If on nasal continuous positive airway pressure, respiratory therapy assess nares and determine need for appliance change or resting period.  Outcome: Progressing

## 2024-01-07 NOTE — PROGRESS NOTES
Neurocritical Care Progress Note    Patient: Jose Rodríguez MRN: [id number]    YOB: 2000  Age: 23 y.o.  Sex: male   Unit: [facility] ICU TOWER Room/Bed: 4506/4506-01 Location: [facility] Date: 1/7/2024  Date of Admission: 1/5/2024  3:16 PM  Admitting Physician: CYNTHIA CALABRESE    Primary Care Physician: No primary care provider on file.          LOS: 2 days      ASSESSMENT & RECOMMENDATIONS     Assessment  23 M with PMH of seizure disorder who presented with SE in the setting of COVID. Infection appeared to be a trigger for his SZ breakthrough. The last breakthrough SZ he had at the time he had PNA  His VPA is subtherapeutic 43 (  BID) during admission, which could also be the contributor  EEG negative for seizure, + FIRDA,   Ammonia 67 and VPA 78    Recommendations  Continue Keppra 1000BID  Continue 250 TID  Daily ammonia and VPA levels, LFT tomorrow   Will consider to switch VPA and start L carnitine if ammonia level continues to trend up. VPA associated hyperammonemia is define as ammonia > 80   DC EEG   Neuro q4       SUBJECTIVE     Chart reviewed, events noted, pt seen & examined. No acute events overnight. Afebrile.     Review of Systems  No changes since pt last seen other than those noted above.    PFSH  No change since the original history and note.     OBJECTIVE     Patient Vitals for the past 24 hrs:   BP Temp Temp src Pulse Resp SpO2 Weight   01/07/24 1000 (!) 150/96 -- -- 69 16 -- --   01/07/24 0900 132/80 -- -- 76 (!) 0 -- --   01/07/24 0845 -- -- -- -- -- 98 % --   01/07/24 0805 (!) 143/100 -- -- 76 19 98 % --   01/07/24 0800 -- 98 °F (36.7 °C) Axillary 83 17 98 % --   01/07/24 0700 -- -- -- (!) 117 20 93 % --   01/07/24 0615 -- -- -- 82 22 98 % --   01/07/24 0600 123/87 -- -- 82 21 98 % 38 kg (83 lb 12.4 oz)   01/07/24 0545 -- -- -- 86 26 98 % --   01/07/24 0530 -- -- -- 88 22 97 % --   01/07/24 0515 -- -- -- (!) 104 19 -- --   01/07/24

## 2024-01-08 LAB
ALBUMIN SERPL-MCNC: 4 G/DL (ref 3.4–5)
ALP SERPL-CCNC: 76 U/L (ref 40–129)
ALT SERPL-CCNC: 12 U/L (ref 10–40)
AMMONIA PLAS-SCNC: 42 UMOL/L (ref 16–60)
ANION GAP SERPL CALCULATED.3IONS-SCNC: 17 MMOL/L (ref 3–16)
AST SERPL-CCNC: 27 U/L (ref 15–37)
BASOPHILS # BLD: 0 K/UL (ref 0–0.2)
BASOPHILS NFR BLD: 0.2 %
BILIRUB DIRECT SERPL-MCNC: <0.2 MG/DL (ref 0–0.3)
BILIRUB INDIRECT SERPL-MCNC: NORMAL MG/DL (ref 0–1)
BILIRUB SERPL-MCNC: 0.3 MG/DL (ref 0–1)
BUN SERPL-MCNC: 12 MG/DL (ref 7–20)
CALCIUM SERPL-MCNC: 9.7 MG/DL (ref 8.3–10.6)
CHLORIDE SERPL-SCNC: 100 MMOL/L (ref 99–110)
CO2 SERPL-SCNC: 22 MMOL/L (ref 21–32)
CREAT SERPL-MCNC: <0.5 MG/DL (ref 0.9–1.3)
DEPRECATED RDW RBC AUTO: 13.1 % (ref 12.4–15.4)
EOSINOPHIL # BLD: 0 K/UL (ref 0–0.6)
EOSINOPHIL NFR BLD: 0 %
GFR SERPLBLD CREATININE-BSD FMLA CKD-EPI: >60 ML/MIN/{1.73_M2}
GLUCOSE BLD-MCNC: 165 MG/DL (ref 70–99)
GLUCOSE BLD-MCNC: 178 MG/DL (ref 70–99)
GLUCOSE BLD-MCNC: 59 MG/DL (ref 70–99)
GLUCOSE BLD-MCNC: 65 MG/DL (ref 70–99)
GLUCOSE BLD-MCNC: 73 MG/DL (ref 70–99)
GLUCOSE SERPL-MCNC: 69 MG/DL (ref 70–99)
HCT VFR BLD AUTO: 35.7 % (ref 40.5–52.5)
HGB BLD-MCNC: 12.3 G/DL (ref 13.5–17.5)
LYMPHOCYTES # BLD: 1.1 K/UL (ref 1–5.1)
LYMPHOCYTES NFR BLD: 8.7 %
MCH RBC QN AUTO: 31.3 PG (ref 26–34)
MCHC RBC AUTO-ENTMCNC: 34.4 G/DL (ref 31–36)
MCV RBC AUTO: 90.8 FL (ref 80–100)
MONOCYTES # BLD: 1.3 K/UL (ref 0–1.3)
MONOCYTES NFR BLD: 10.4 %
NEUTROPHILS # BLD: 9.8 K/UL (ref 1.7–7.7)
NEUTROPHILS NFR BLD: 80.7 %
PERFORMED ON: ABNORMAL
PERFORMED ON: NORMAL
PHOSPHATE SERPL-MCNC: 3.1 MG/DL (ref 2.5–4.9)
PLATELET # BLD AUTO: 248 K/UL (ref 135–450)
PMV BLD AUTO: 9.2 FL (ref 5–10.5)
POTASSIUM SERPL-SCNC: 3.3 MMOL/L (ref 3.5–5.1)
PROT SERPL-MCNC: 7.4 G/DL (ref 6.4–8.2)
RBC # BLD AUTO: 3.93 M/UL (ref 4.2–5.9)
SODIUM SERPL-SCNC: 139 MMOL/L (ref 136–145)
VALPROATE SERPL-MCNC: 73.6 UG/ML (ref 50–100)
WBC # BLD AUTO: 12.1 K/UL (ref 4–11)

## 2024-01-08 PROCEDURE — 94761 N-INVAS EAR/PLS OXIMETRY MLT: CPT

## 2024-01-08 PROCEDURE — 6370000000 HC RX 637 (ALT 250 FOR IP)

## 2024-01-08 PROCEDURE — 80076 HEPATIC FUNCTION PANEL: CPT

## 2024-01-08 PROCEDURE — 2700000000 HC OXYGEN THERAPY PER DAY

## 2024-01-08 PROCEDURE — 36415 COLL VENOUS BLD VENIPUNCTURE: CPT

## 2024-01-08 PROCEDURE — 6360000002 HC RX W HCPCS

## 2024-01-08 PROCEDURE — 85025 COMPLETE CBC W/AUTO DIFF WBC: CPT

## 2024-01-08 PROCEDURE — 2500000003 HC RX 250 WO HCPCS

## 2024-01-08 PROCEDURE — 6360000002 HC RX W HCPCS: Performed by: STUDENT IN AN ORGANIZED HEALTH CARE EDUCATION/TRAINING PROGRAM

## 2024-01-08 PROCEDURE — 2580000003 HC RX 258: Performed by: INTERNAL MEDICINE

## 2024-01-08 PROCEDURE — 82140 ASSAY OF AMMONIA: CPT

## 2024-01-08 PROCEDURE — 2580000003 HC RX 258

## 2024-01-08 PROCEDURE — 80164 ASSAY DIPROPYLACETIC ACD TOT: CPT

## 2024-01-08 PROCEDURE — 6360000002 HC RX W HCPCS: Performed by: INTERNAL MEDICINE

## 2024-01-08 PROCEDURE — C9113 INJ PANTOPRAZOLE SODIUM, VIA: HCPCS

## 2024-01-08 PROCEDURE — 80069 RENAL FUNCTION PANEL: CPT

## 2024-01-08 PROCEDURE — 99233 SBSQ HOSP IP/OBS HIGH 50: CPT | Performed by: INTERNAL MEDICINE

## 2024-01-08 PROCEDURE — 1200000000 HC SEMI PRIVATE

## 2024-01-08 RX ORDER — POTASSIUM CHLORIDE 7.45 MG/ML
10 INJECTION INTRAVENOUS
Status: DISCONTINUED | OUTPATIENT
Start: 2024-01-08 | End: 2024-01-08

## 2024-01-08 RX ADMIN — VALPROATE SODIUM 250 MG: 100 INJECTION, SOLUTION INTRAVENOUS at 15:22

## 2024-01-08 RX ADMIN — Medication: at 09:02

## 2024-01-08 RX ADMIN — POTASSIUM BICARBONATE 20 MEQ: 782 TABLET, EFFERVESCENT ORAL at 08:22

## 2024-01-08 RX ADMIN — ENOXAPARIN SODIUM 40 MG: 100 INJECTION SUBCUTANEOUS at 09:02

## 2024-01-08 RX ADMIN — AMPICILLIN SODIUM, SULBACTAM SODIUM 3000 MG: 2; 1 INJECTION, POWDER, FOR SOLUTION INTRAMUSCULAR; INTRAVENOUS at 12:22

## 2024-01-08 RX ADMIN — VALPROATE SODIUM 250 MG: 100 INJECTION, SOLUTION INTRAVENOUS at 05:56

## 2024-01-08 RX ADMIN — LEVETIRACETAM 1000 MG: 500 INJECTION, SOLUTION, CONCENTRATE INTRAVENOUS at 05:39

## 2024-01-08 RX ADMIN — DEXTROSE MONOHYDRATE 125 ML: 100 INJECTION, SOLUTION INTRAVENOUS at 15:09

## 2024-01-08 RX ADMIN — AMPICILLIN SODIUM, SULBACTAM SODIUM 3000 MG: 2; 1 INJECTION, POWDER, FOR SOLUTION INTRAMUSCULAR; INTRAVENOUS at 07:12

## 2024-01-08 RX ADMIN — SODIUM CHLORIDE, PRESERVATIVE FREE 10 ML: 5 INJECTION INTRAVENOUS at 08:08

## 2024-01-08 RX ADMIN — AMPICILLIN SODIUM, SULBACTAM SODIUM 3000 MG: 2; 1 INJECTION, POWDER, FOR SOLUTION INTRAMUSCULAR; INTRAVENOUS at 18:31

## 2024-01-08 RX ADMIN — AMPICILLIN SODIUM, SULBACTAM SODIUM 3000 MG: 2; 1 INJECTION, POWDER, FOR SOLUTION INTRAMUSCULAR; INTRAVENOUS at 00:47

## 2024-01-08 RX ADMIN — SODIUM CHLORIDE, PRESERVATIVE FREE 10 ML: 5 INJECTION INTRAVENOUS at 21:22

## 2024-01-08 RX ADMIN — LEVETIRACETAM 1000 MG: 500 INJECTION, SOLUTION, CONCENTRATE INTRAVENOUS at 17:04

## 2024-01-08 RX ADMIN — DEXTROSE MONOHYDRATE 125 ML: 100 INJECTION, SOLUTION INTRAVENOUS at 12:02

## 2024-01-08 RX ADMIN — VALPROATE SODIUM 250 MG: 100 INJECTION, SOLUTION INTRAVENOUS at 22:56

## 2024-01-08 RX ADMIN — VANCOMYCIN HYDROCHLORIDE 750 MG: 10 INJECTION, POWDER, LYOPHILIZED, FOR SOLUTION INTRAVENOUS at 13:08

## 2024-01-08 RX ADMIN — PANTOPRAZOLE SODIUM 40 MG: 40 INJECTION, POWDER, FOR SOLUTION INTRAVENOUS at 08:08

## 2024-01-08 RX ADMIN — Medication: at 21:21

## 2024-01-08 ASSESSMENT — PAIN SCALES - GENERAL
PAINLEVEL_OUTOF10: 0

## 2024-01-08 NOTE — CONSULTS
Clinical Pharmacy Consult Note    Vancomycin - Management by Pharmacy    Consult Date(s): 1/8/2024 12:27 PM  Consulting Provider(s): Dr. Phyllis Marquez    Assessment / Plan  MRSA Pneumonia - Vancomycin  Concurrent Antimicrobials: Unasyn 3 g IV q6h (Day #4 of 7)  Day of Vanc Therapy / Ordered Duration: Day #1 of 7  Current Dosing Method: Intermittent Dosing by Levels  Therapeutic Goal: Trough ~ 15 mg/L  Current Dose / Plan:   SCr is most likely at pt's baseline. However, pt is non-ambulatory at baseline so CrCl likely overestimate of pt's actual renal function  Strict I/Os not yet documented to include in assessment at this time  Will plan to order vancomycin 750 mg IV x1 dose to be given today followed by intermittent dosing of vancomycin until ability to clear drug can be better estimated   Intermittent dosing placeholder added to MAR   Will plan to obtain random level tomorrow AM (1/9, ordered) prior to re-dosing vancomycin to ensure pt does not accumulate drug  Will continue to monitor clinical condition and make adjustments to regimen as appropriate    Thank you for consulting pharmacy!    Yoselin Mireles, PharmD, [facility]  Clinical Pharmacy Specialist  Wireless: [phone]  1/8/2024 12:28 PM      Interval update:  Initiation    Subjective/Objective: Jose Rodríguez is a 23 y.o. 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    Pharmacy is consulted to dose vancomycin.    Ht Readings from Last 1 Encounters:   01/06/24 1.346 m (4' 5\")     Wt Readings from Last 1 Encounters:   01/08/24 38 kg (83 lb 12.4 oz)     Current & Prior Antimicrobial Regimen(s):  Unasyn 3 g IV q6h (1/5-current)  Vancomycin IV PTD  Intermittent dosing (1/8-current)    Vancomycin Level(s) / Doses:  Date Time Dose Type of Level / Level Interpretation   1/9 0600 750 mg IV x1 Random =            Note: Serum levels collected for AUC-based

## 2024-01-08 NOTE — PROGRESS NOTES
Attempted to give pt a small spoon each of vanilla pudding, chocolate pudding and apple sauce. Each was rejected, AEB pt pushing RN away and turning his head away.   Notified crit care team.

## 2024-01-08 NOTE — PROGRESS NOTES
V2.0    [facility] [place]      Name:  Jose Rodríguez /Age/Sex: 2000  (23 y.o. male)   MRN & CSN:  [id number] & [id number] Encounter Date/Time: 2024 9:01 AM EST   Location:  [facility]/4506-01 PCP: No primary care provider on file.     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  Patient was intubated for airway protection.  Was admitted with status epilepticus with aspiration pneumonia.  Underwent bronchoscopy on admission.  Extubated on 2024.  Neurology consulted.  On Keppra and on a higher dose of Depakote as Depakote value was low on admission.    Plan:     Seizures-extubated on 2024, speech-language pathology to evaluate.  Possibly switch seizure medications to p.o. if able to tolerate  -Neurocritical care consulted, consult appreciated  -Continue IV Keppra  -Continue current Depakote dose 3 times daily.  Depakote level low on admission  -Continue propofol drip, hold prior to extubation  -EEG noted, no seizures  -Continue neurocheck  -Ammonia level mildly elevated, but stable       Aspiration and MRSA pneumonia with mucous plugging,   -Start Unasyn d4  -S/p bronchoscopy on 2024.  Bronchoscopy culture positive for MRSA  -CT chest shows mucous plugging with resultant middle and lower lobe with airspace disease on admission  -Procalcitonin elevated patient  -Vancomycin added as bronchoscopic culture positive for MRSA.  Pharmacy to dose vancomycin     Sepsis -present on admission-slowly improving, WBC trending down  -With tachypnea leukocytosis and tachycardia  -Blood cultures NGTD        Recent COVID infection  -Diagnosed on 10/27  -Positive on admission     Developmental elevated NP partial trisomy  -Lives in a group home at baseline alert, minimally verbal    ALKPHOS  --  76       Lipids: No results found for: \"CHOL\", \"HDL\", \"TRIG\"  Hemoglobin A1C:   Lab Results   Component Value Date/Time    LABA1C 5.0 11/01/2023 05:44 AM     TSH: No results found for: \"TSH\"  Troponin: No results found for: \"TROPONINT\"  Lactic Acid:   Recent Labs     01/05/24  1749 01/05/24  2339   LACTA 2.1* 1.3       BNP: No results for input(s): \"PROBNP\" in the last 72 hours.  UA:  Lab Results   Component Value Date/Time    NITRU Negative 01/05/2024 07:48 AM    COLORU Straw 01/05/2024 07:48 AM    PHUR 7.0 01/05/2024 07:48 AM    PHUR 7.0 01/05/2024 07:48 AM    WBCUA 0-2 01/05/2024 07:48 AM    RBCUA 3-4 01/05/2024 07:48 AM    CLARITYU Clear 01/05/2024 07:48 AM    SPECGRAV 1.020 01/05/2024 07:48 AM    LEUKOCYTESUR Negative 01/05/2024 07:48 AM    UROBILINOGEN 0.2 01/05/2024 07:48 AM    BILIRUBINUR Negative 01/05/2024 07:48 AM    BLOODU TRACE-INTACT 01/05/2024 07:48 AM    GLUCOSEU 100 01/05/2024 07:48 AM    KETUA Negative 01/05/2024 07:48 AM     Urine Cultures: No results found for: \"LABURIN\"  Blood Cultures:   Lab Results   Component Value Date/Time    BC  01/05/2024 05:49 PM     No Growth to date.  Any change in status will be called.     Lab Results   Component Value Date/Time    BLOODCULT2  01/05/2024 05:49 PM     No Growth to date.  Any change in status will be called.     Organism:   Lab Results   Component Value Date/Time    ORG Staph aureus MRSA 01/05/2024 06:13 PM         Electronically signed by Phyllis Marquez MD on 1/8/2024 at 7:53 AM

## 2024-01-08 NOTE — PROGRESS NOTES
NEUROCRITICAL CARE PROGRESS NOTE       Patient Name: Jose Rodríguez YOB: 2000   Sex: Male Age: 23 yrs     CC / Reason for Consult: breakthrough seizure    Changes over last 24 hours:   -Now off cvEEG  -No further seizure activity  -Ammonia 42 and VPA level 73.6 this morning    ROS: Unable to provide as he is non verbal.    ASSESSMENT & RECOMMENDATIONS   Assessment:  -Jose is a 23 year old male with seizure disorder who presented to the ED with breakthrough seizure requiring intubation in the setting of recent COVID infection which is the likely provoking factor. VPA level at admission was 43, Keppra level >100. The last time he had breakthrough seizure was in October of last year when he had pneumonia. Prior to that, he had been seizure free for about 5 years.     -Prior to this admission his anti seizure medication regimen consisted of Keppra 800mg BID and VPA 500mg BID however during his hospitalization in October,  his Ammonia levels were found to be elevated so his VPA was decreased to 250 BID and his Keppra was increased to 1000mg BID and he has remained on this regimen since. His seizures medications were managed by Dr. Taya Guzman at [facility] (last seen 10/3/22) however he had been instructed to follow up with a Neurologist as UC.     Plan:  -Continue Keppra 1000mg BID  -Continue  TID (increased from BID)  -Establish with Neurologist at   -Seizure precautions   -We will sign off at this time. Please do not hesitate to reach out with any questions.    AUDRA Infante - CNP   Neurocritical Care   1/8/2024 10:20 AM  [employer]: [facility] Neurocritical Care  [phone]    HISTORY   Interval History:     No further seizure activity.    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     Seizures (HCC)     Wheelchair dependent       Allergies No Known Allergies    Diet Diet NPO   Isolation Contact, Contact, Droplet Plus     CURRENT SCHEDULED MEDICATIONS   Inpatient Medications     balsum peru-castor oil, , Topical, BID    pantoprazole, 40 mg, IntraVENous, Daily    sodium chloride flush, 5-40 mL, IntraVENous, 2 times per day    enoxaparin, 40 mg, SubCUTAneous, Daily    levETIRAcetam, 1,000 mg, IntraVENous, Q12H    ampicillin-sulbactam, 3,000 mg, IntraVENous, Q6H    valproate (DEPACON) 250 mg in sodium chloride 0.9 % 100 mL IVPB, 250 mg, IntraVENous, Q8H   Infusions    sodium chloride        Antibiotics   Recent Abx Admin                     ampicillin-sulbactam (UNASYN) 3,000 mg in sodium chloride 0.9 % 100 mL IVPB (mini-bag) (mg) 3,000 mg New Bag 01/08/24 0712     3,000 mg New Bag  0047     3,000 mg New Bag 01/07/24 1836     3,000 mg New Bag  1344                      LABS   Metabolic Panel Recent Labs     01/06/24  0530 01/06/24  1339 01/07/24  0628 01/07/24  1118 01/08/24  0406   NA  --  137 138  --  139   K  --  3.8 3.8  --  3.3*   CL  --  102 105  --  100   CO2  --  20* 18*  --  22   BUN  --  5* 13  --  12   CREATININE  --  <0.5* <0.5*  --  <0.5*   GLUCOSE  --  115* 110*  --  69*   CALCIUM  --  9.4 9.5  --  9.7   LABALBU  --  3.5 3.4  --  4.0   PHOS  --  2.3* 3.1  --  3.1   MG  --  1.80  --   --   --    ALKPHOS  --   --   --   --  76   ALT  --   --   --   --  12   AST  --   --   --   --  27   AMMONIA 47  --   --  67* 42      CBC / Coags Recent Labs     01/06/24  0519 01/07/24  0628 01/08/24  0406   WBC 11.4* 10.1 12.1*   RBC 4.22 3.92* 3.93*   HGB 12.8* 12.2* 12.3*   HCT 38.5* 35.7* 35.7*    202 248      Other No results for input(s): \"LABA1C\", \"LDLCALC\", \"TRIG\", \"TSH\", \"WDLVWAQH44\", \"FOLATE\", \"LABSALI\", \"COVID19\" in the last 72 hours.  Recent Labs     01/05/24  2339 01/06/24  0512 01/08/24  0406   VALPROATE  --    < > 73.6   LACTA 1.3  --   --     < > = values in this interval not displayed.        DIAGNOSTICS   IMAGES:  Images personally reviewed and agree w/

## 2024-01-08 NOTE — PROGRESS NOTES
ICU Progress Note    Admit Date: 1/5/2024  Day: 4  Vent Day: None  IV Access:Peripheral  IV Fluids:None  Vasopressors:None                Antibiotics: None  Diet: Diet NPO    CC: seizure    Interval history:   No acute events overnight. Extubated yesterday with good tolerance. At bedside, pt satting at 95% on NC 2 L, able to be downtitrated to 1 L with ease. No noted seizure activity overnight. Sleeping soundly on examination.    HPI:   Jose Rodríguez is a 23 y.o. 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     Pt lives at a group home and was found this AM to be seizing. Was given 5 mg versed en route to [place] ED, but continued seizing. At the ED, pt hypertensive and tachycardic and arrived at [facility]. Started on versed and propofol drip, and given loading dose of keppra at the ED. Had episode of vomiting with concern for aspiration, received 1x dose cefepime 2g and vancomycin 1.25 g. Pt was intubated and transferred to [facility] for further neurological monitoring given availability of cEEG. Recently COVID positive.     Prior hx significant for recent admission for seizure disorder (10/29-11/2) in the setting of sepsis from likely pneumonia (suspected community-acquired). Neurology was consulted during the admission and pt was decreased in depakote to 250 mg BID (originally 500 mg BID) and increased in keppra to 1000 mg BID (originally 800 mg BID) based on blood levels. Mother refused EEG during the admission. Pt has not been seen by Neurology since discharge from that admission; was instructed to follow-up with outpatient  neurologist. Was previously seen by Neurology at [facility] (last seen on 10/3/22). Prior to the admission, pt's mother denies any known seizure activity for 10 years and that he has been compliant with his home depakote and keppra. Of note, pt was recently transferred to a group  health home in August 2023 as his mother has been physically unable to meet all of his needs at this point.    Medications:     Scheduled Meds:   balsum peru-castor oil   Topical BID    pantoprazole  40 mg IntraVENous Daily    sodium chloride flush  5-40 mL IntraVENous 2 times per day    enoxaparin  40 mg SubCUTAneous Daily    levETIRAcetam  1,000 mg IntraVENous Q12H    ampicillin-sulbactam  3,000 mg IntraVENous Q6H    valproate (DEPACON) 250 mg in sodium chloride 0.9 % 100 mL IVPB  250 mg IntraVENous Q8H     Continuous Infusions:   sodium chloride      propofol Stopped (01/07/24 1147)     PRN Meds:dextrose bolus **OR** dextrose bolus, LORazepam, sodium chloride flush, sodium chloride, ondansetron **OR** ondansetron, polyethylene glycol, acetaminophen **OR** acetaminophen    Objective:   Vitals:   T-max:  Patient Vitals for the past 8 hrs:   BP Temp Temp src Pulse Resp SpO2 Weight   01/08/24 0800 122/77 97.9 °F (36.6 °C) Axillary 93 29 -- --   01/08/24 0700 119/67 -- -- 81 27 96 % --   01/08/24 0601 -- -- -- -- -- -- 38 kg (83 lb 12.4 oz)   01/08/24 0600 (!) 124/103 -- -- 86 17 100 % --   01/08/24 0500 127/87 -- -- 80 (!) 32 100 % --   01/08/24 0400 -- 98 °F (36.7 °C) Axillary (!) 117 28 100 % --   01/08/24 0300 (!) 136/90 -- -- 93 27 97 % --   01/08/24 0200 (!) 112/96 -- -- 90 29 96 % --   01/08/24 0100 (!) 161/108 -- -- (!) 110 25 100 % --         Intake/Output Summary (Last 24 hours) at 1/8/2024 0820  Last data filed at 1/8/2024 0601  Gross per 24 hour   Intake 157 ml   Output 0 ml   Net 157 ml         Review of Systems  Pt nonverbal    Physical Exam  Constitutional:       Comments: Sleeping soundly   Cardiovascular:      Rate and Rhythm: Normal rate and regular rhythm.      Pulses: Normal pulses.      Heart sounds: Normal heart sounds.   Pulmonary:      Effort: Pulmonary effort is normal     Snoring present, heard in all lung fields equally  Abdominal:      General: Abdomen is flat. Bowel sounds are normal.

## 2024-01-08 NOTE — CARE COORDINATION
Case Management Assessment  Initial Evaluation    Date/Time of Evaluation: 1/8/2024 11:18 AM  Assessment Completed by: Hayley Montes RN    If patient is discharged prior to next notation, then this note serves as note for discharge by case management.    Patient Name: Jose Rodríguez                   YOB: 2000  Diagnosis: Status epilepticus (HCC) [G40.901]                   Date / Time: 1/5/2024  3:16 PM    Patient Admission Status: Inpatient   Readmission Risk (Low < 19, Mod (19-27), High > 27): Readmission Risk Score: 15.4    Current PCP: No primary care provider on file.  PCP verified by CM? Yes    Chart Reviewed: Yes      History Provided by: Medical Record, Child/Family (mother Aimee)  Patient Orientation: Alert and Oriented    Patient Cognition: Alert    Hospitalization in the last 30 days (Readmission):  No    If yes, Readmission Assessment in CM Navigator will be completed.    Advance Directives:      Code Status: Full Code   Patient's Primary Decision Maker is: Named in Scanned ACP Document (mother is legal guardian)    Primary Decision Maker: Aimee Delacruz - Parent, Legal Guardian - [phone]    Discharge Planning:    Patient lives with: Alone Type of Home: Group Home ([facility])  Primary Care Giver: Other (Comment) (group home resident)  Patient Support Systems include: Family Members, Parent   Current Financial resources: Medicare  Current community resources: Transportation, Other (Comment) (group home resident)  Current services prior to admission: Other (Comment) ([facility]. Mother is legal guardian)            Type of Home Care services:  None    ADLS  Prior functional level: Assistance with the following:, Housework, Shopping, Cooking  Current functional level: Other (see comment) (TBD pending PTOTSL;P)    Family can provide assistance at DC: No  Would you like Case Management to discuss the discharge plan with any other family

## 2024-01-08 NOTE — PLAN OF CARE
Problem: Discharge Planning  Goal: Discharge to home or other facility with appropriate resources  Outcome: Progressing     Problem: Safety - Medical Restraint  Goal: Remains free of injury from restraints (Restraint for Interference with Medical Device)  Description: INTERVENTIONS:  1. Determine that other, less restrictive measures have been tried or would not be effective before applying the restraint  2. Evaluate the patient's condition at the time of restraint application  3. Inform patient/family regarding the reason for restraint  4. Q2H: Monitor safety, psychosocial status, comfort, nutrition and hydration  Outcome: Progressing  Flowsheets  Taken 1/8/2024 0000  Remains free of injury from restraints (restraint for interference with medical device): Every 2 hours: Monitor safety, psychosocial status, comfort, nutrition and hydration  Taken 1/7/2024 2200  Remains free of injury from restraints (restraint for interference with medical device): Every 2 hours: Monitor safety, psychosocial status, comfort, nutrition and hydration  Taken 1/7/2024 2000  Remains free of injury from restraints (restraint for interference with medical device): Every 2 hours: Monitor safety, psychosocial status, comfort, nutrition and hydration     Problem: Neurosensory - Adult  Goal: Achieves stable or improved neurological status  Outcome: Progressing  Flowsheets (Taken 1/7/2024 2000)  Achieves stable or improved neurological status:   Assess for and report changes in neurological status   Initiate measures to prevent increased intracranial pressure   Maintain blood pressure and fluid volume within ordered parameters to optimize cerebral perfusion and minimize risk of hemorrhage   Monitor temperature, glucose, and sodium. Initiate appropriate interventions as ordered  Goal: Absence of seizures  Outcome: Progressing  Flowsheets (Taken 1/7/2024 2000)  Absence of seizures:   Monitor for seizure activity.  If seizure occurs, document

## 2024-01-08 NOTE — PROGRESS NOTES
Speech Therapy   Assessment attempt    Patient was attempted to be seen by Speech Therapy Department this date for bedside swallowing evaluation. Patient was unable to be seen due to turning head away from PO trials and becoming increasingly agitated as the session progressed. Recommended RN to continue to trial ice chips and to page SLP if pt becomes more receptive to assessment.  RN, Ashley, was in agreement.    Shantell Javed MA, CCC-SLP [id number]  Speech-Language Pathologist  Pager  [phone]

## 2024-01-08 NOTE — PROGRESS NOTES
Comprehensive Nutrition Assessment    RECOMMENDATIONS:  PO Diet: Pureed diet w/ 1:1 assist feeding  If unwilling to take PO plan for TF (recs provided)  Nutrition Education: Education not appropriate     NUTRITION ASSESSMENT:   Nutritional summary & status: Pt remains NPO, unwilling to participate w/ SLP for swallow eval. Per family pt eats a pureed diet at facility, plan to remove NGT and allow him to eat pureed (SLP tested water which mother states pt will not drink). If pt does not eat pureed diet or s/s of aspiration occur plan to start TF. 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 Currently hypokalemic, being repleted. All other lytes WNL.   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    MALNUTRITION ASSESSMENT  Context of Malnutrition: Chronic Illness   Malnutrition Status: Moderate malnutrition  Findings of the 6 clinical characteristics of malnutrition (Minimum of 2 out of 6 clinical characteristics is required to make the diagnosis of moderate or severe Protein Calorie Malnutrition based on AND/ASPEN Guidelines):  Energy Intake:  Mild decrease in energy intake (Comment)  Weight Loss:  No significant weight loss     Body Fat Loss:  Mild body fat loss Buccal region   Muscle Mass Loss:  Mild muscle mass loss Clavicles (pectoralis & deltoids), Temples (temporalis)  Fluid Accumulation:  Mild      NUTRITION DIAGNOSIS   Inadequate oral intake related to cognitive or neurological impairment as evidenced by NPO or clear liquid status due to medical condition    Nutrition Monitoring and Evaluation:   Food/Nutrient Intake Outcomes:  Enteral Nutrition Intake/Tolerance, Food and Nutrient Intake  Physical Signs/Symptoms Outcomes:  Biochemical Data, Nutrition Focused Physical Findings, Weight, Chewing or Swallowing, Hemodynamic Status     OBJECTIVE DATA: Significant to nutrition assessment  Nutrition  Related Findings: K 3.3. +BM 1/8.  Wounds: None  Nutrition Goals: Initiate nutrition support, Initiate PO diet     CURRENT NUTRITION THERAPIES  ADULT DIET; Dysphagia - Pureed  Current Tube Feeding (TF) Orders:  Feeding Route: Nasogastric  Formula: Standard with Fiber  Schedule: Continuous  Additives/Modulars: None  Water Flushes: 30 mL q4h  Goal TF & Flush Orders Provides: Jevity 1.5 @ 30 mL/hr to provide: 720 mL TV, 1080 kcal, 46 g/pro and 547 mL FW  PO Intake: NPO   PO Supplement Intake:NPO  Additional Sources of Calories/IVF:N/A     COMPARATIVE STANDARDS  Energy (kcal):  800-1000 (20-25)     Protein (g):  57-68 (1.5-1.8)       Fluid (ml/day):  1 mL/kcal or per MD    ANTHROPOMETRICS  Current Height: 134.6 cm (4' 5\")  Current Weight - Scale: 38 kg (83 lb 12.4 oz)    Admission weight: 38 kg (83 lb 12.4 oz)    The patient will be monitored per nutrition standards of care. Consult dietitian if additional nutrition interventions are needed prior to RD reassessment.     Melany Espinoza, YVETTE, LD  [place]:  [phone]

## 2024-01-08 NOTE — PLAN OF CARE
Problem: Discharge Planning  Goal: Discharge to home or other facility with appropriate resources  1/8/2024 1349 by Ashley Camejo  Outcome: Progressing  1/8/2024 0052 by Jane uCmmings RN  Outcome: Progressing     Problem: Safety - Medical Restraint  Goal: Remains free of injury from restraints (Restraint for Interference with Medical Device)  Description: INTERVENTIONS:  1. Determine that other, less restrictive measures have been tried or would not be effective before applying the restraint  2. Evaluate the patient's condition at the time of restraint application  3. Inform patient/family regarding the reason for restraint  4. Q2H: Monitor safety, psychosocial status, comfort, nutrition and hydration  1/8/2024 1349 by Ashley Camejo  Outcome: Progressing  Flowsheets  Taken 1/8/2024 0600 by Jane Cummings RN  Remains free of injury from restraints (restraint for interference with medical device): Every 2 hours: Monitor safety, psychosocial status, comfort, nutrition and hydration  Taken 1/8/2024 0400 by Jane Cummings RN  Remains free of injury from restraints (restraint for interference with medical device): Every 2 hours: Monitor safety, psychosocial status, comfort, nutrition and hydration  Taken 1/8/2024 0200 by Jane Cummings RN  Remains free of injury from restraints (restraint for interference with medical device): Every 2 hours: Monitor safety, psychosocial status, comfort, nutrition and hydration  1/8/2024 0052 by Jane Cummings RN  Outcome: Progressing  Flowsheets  Taken 1/8/2024 0000 by Jane Cummings RN  Remains free of injury from restraints (restraint for interference with medical device): Every 2 hours: Monitor safety, psychosocial status, comfort, nutrition and hydration  Taken 1/7/2024 2200 by Jane Cummings RN  Remains free of injury from restraints (restraint for interference with medical device): Every 2 hours: Monitor safety, psychosocial status, comfort, nutrition and hydration  Taken  of any seizure activity  Goal: Achieves maximal functionality and self care  Outcome: Progressing     Problem: Respiratory - Adult  Goal: Achieves optimal ventilation and oxygenation  1/8/2024 1349 by Ashley Camejo  Outcome: Progressing  1/8/2024 0052 by Jane Cummings RN  Outcome: Progressing     Problem: Skin/Tissue Integrity - Adult  Goal: Skin integrity remains intact  1/8/2024 1349 by Ashley Camejo  Outcome: Progressing  1/8/2024 0052 by Jane Cummings RN  Outcome: Progressing  Flowsheets (Taken 1/7/2024 2327)  Skin Integrity Remains Intact:   Monitor for areas of redness and/or skin breakdown   Assess vascular access sites hourly   Every 4-6 hours minimum: Change oxygen saturation probe site  Goal: Incisions, wounds, or drain sites healing without S/S of infection  1/8/2024 1349 by Ashlye Camejo  Outcome: Progressing  1/8/2024 0052 by Jane Cummings RN  Outcome: Progressing  Flowsheets (Taken 1/7/2024 2327)  Incisions, Wounds, or Drain Sites Healing Without Sign and Symptoms of Infection:   TWICE DAILY: Assess and document skin integrity   ADMISSION and DAILY: Assess and document risk factors for pressure ulcer development  Goal: Oral mucous membranes remain intact  1/8/2024 1349 by Ashley Camejo  Outcome: Progressing  1/8/2024 0052 by Jane Cummings RN  Outcome: Progressing  Flowsheets (Taken 1/7/2024 2327)  Oral Mucous Membranes Remain Intact:   Assess oral mucosa and hygiene practices   Implement preventative oral hygiene regimen   Implement oral medicated treatments as ordered     Problem: Genitourinary - Adult  Goal: Absence of urinary retention  1/8/2024 1349 by Ashley Camejo  Outcome: Progressing  1/8/2024 0052 by Jane Cummings, RN  Outcome: Progressing  Goal: Urinary catheter remains patent  Outcome: Progressing     Problem: Infection - Adult  Goal: Absence of infection at discharge  1/8/2024 1349 by Ashley Camejo  Outcome: Progressing  1/8/2024 0052 by Jane Cummings,

## 2024-01-08 NOTE — PROGRESS NOTES
Sister at bedside, brought a sippy cup and some Ensures for pt. He drank most of an ensure and a little water. D/W crit care team re: removing NG to make it easier for pt to self-feed (his preferred method at this point). They're en route.  TF still trickling at this time.

## 2024-01-08 NOTE — PLAN OF CARE
Patient extubated. Alert, nonverbal at baseline. Moving all extremities with no noticeable focal deficits. cEEG off.  No changes to plan of care at this time. Please call with any questions or exam changes.    AUDRA Aggarwal - CNP   Neurology & Neurocritical Care   1/7/2024 9:31 PM    ICU Patients:   Neurocritical Care Line: [phone]  [employer]: [facility] Neurocritical [facility]

## 2024-01-08 NOTE — PROGRESS NOTES
NG tube removed per Dr Kaba.  Pt drinking Ensure and water in sippy cup.   Sleeve placed over L arm to protect PIVs  Restraints removed.

## 2024-01-08 NOTE — PROGRESS NOTES
Pt's mother called for update.  She said pt will not drink water or eat ice.  He likes vanilla Boost/ensure, Strawberry yogurt and pudding. He usually drinks from a sippy cup.    Paged ST to discuss with them re: risks/benefits of trying non-water options as initial texture for swallow eval. Concerns of aspiration pneumonia but hard to tell if this was caused during seizure or is an actual swallowing issue.

## 2024-01-08 NOTE — PROGRESS NOTES
Pt still refusing anything offered to him to eat. Lunch tray with pureed bananas and pureed peaches each offered to him, followed by a vanilla Ensure shake. Pt turns head away from each item. Leaving the tray in the room for now for family to attempt.   Dextrose given around 1220 per hypoglycemia protocol. Continuing to monitor BG.

## 2024-01-09 LAB
ALBUMIN SERPL-MCNC: 3.3 G/DL (ref 3.4–5)
ANION GAP SERPL CALCULATED.3IONS-SCNC: 16 MMOL/L (ref 3–16)
BACTERIA BLD CULT ORG #2: NORMAL
BACTERIA BLD CULT ORG #2: NORMAL
BACTERIA BLD CULT: NORMAL
BACTERIA BLD CULT: NORMAL
BACTERIA SPEC RESP CULT: ABNORMAL
BACTERIA SPEC RESP CULT: ABNORMAL
BASOPHILS # BLD: 0 K/UL (ref 0–0.2)
BASOPHILS NFR BLD: 0.1 %
BUN SERPL-MCNC: 10 MG/DL (ref 7–20)
CALCIUM SERPL-MCNC: 9.7 MG/DL (ref 8.3–10.6)
CHLORIDE SERPL-SCNC: 99 MMOL/L (ref 99–110)
CO2 SERPL-SCNC: 17 MMOL/L (ref 21–32)
CREAT SERPL-MCNC: <0.5 MG/DL (ref 0.9–1.3)
DEPRECATED RDW RBC AUTO: 13.4 % (ref 12.4–15.4)
EOSINOPHIL # BLD: 0 K/UL (ref 0–0.6)
EOSINOPHIL NFR BLD: 0.3 %
GFR SERPLBLD CREATININE-BSD FMLA CKD-EPI: >60 ML/MIN/{1.73_M2}
GLUCOSE BLD-MCNC: 82 MG/DL (ref 70–99)
GLUCOSE BLD-MCNC: 90 MG/DL (ref 70–99)
GLUCOSE SERPL-MCNC: 80 MG/DL (ref 70–99)
GRAM STN SPEC: ABNORMAL
HCT VFR BLD AUTO: 35.7 % (ref 40.5–52.5)
HGB BLD-MCNC: 12.6 G/DL (ref 13.5–17.5)
LYMPHOCYTES # BLD: 1.4 K/UL (ref 1–5.1)
LYMPHOCYTES NFR BLD: 18.2 %
MCH RBC QN AUTO: 32.1 PG (ref 26–34)
MCHC RBC AUTO-ENTMCNC: 35.4 G/DL (ref 31–36)
MCV RBC AUTO: 90.7 FL (ref 80–100)
MONOCYTES # BLD: 0.9 K/UL (ref 0–1.3)
MONOCYTES NFR BLD: 11.9 %
NEUTROPHILS # BLD: 5.3 K/UL (ref 1.7–7.7)
NEUTROPHILS NFR BLD: 69.5 %
ORGANISM: ABNORMAL
PERFORMED ON: NORMAL
PERFORMED ON: NORMAL
PHOSPHATE SERPL-MCNC: 3.7 MG/DL (ref 2.5–4.9)
PLATELET # BLD AUTO: ABNORMAL K/UL (ref 135–450)
PLATELET BLD QL SMEAR: ABNORMAL
PMV BLD AUTO: ABNORMAL FL (ref 5–10.5)
POTASSIUM SERPL-SCNC: 4.3 MMOL/L (ref 3.5–5.1)
RBC # BLD AUTO: 3.94 M/UL (ref 4.2–5.9)
RBC MORPH BLD: NORMAL
SODIUM SERPL-SCNC: 132 MMOL/L (ref 136–145)
VANCOMYCIN SERPL-MCNC: <4 UG/ML
WBC # BLD AUTO: 7.7 K/UL (ref 4–11)

## 2024-01-09 PROCEDURE — 2580000003 HC RX 258: Performed by: INTERNAL MEDICINE

## 2024-01-09 PROCEDURE — C9113 INJ PANTOPRAZOLE SODIUM, VIA: HCPCS

## 2024-01-09 PROCEDURE — 2580000003 HC RX 258

## 2024-01-09 PROCEDURE — 80069 RENAL FUNCTION PANEL: CPT

## 2024-01-09 PROCEDURE — 1200000000 HC SEMI PRIVATE

## 2024-01-09 PROCEDURE — 85025 COMPLETE CBC W/AUTO DIFF WBC: CPT

## 2024-01-09 PROCEDURE — 6360000002 HC RX W HCPCS

## 2024-01-09 PROCEDURE — 36415 COLL VENOUS BLD VENIPUNCTURE: CPT

## 2024-01-09 PROCEDURE — 6360000002 HC RX W HCPCS: Performed by: INTERNAL MEDICINE

## 2024-01-09 PROCEDURE — 2500000003 HC RX 250 WO HCPCS

## 2024-01-09 PROCEDURE — 92610 EVALUATE SWALLOWING FUNCTION: CPT

## 2024-01-09 PROCEDURE — 80202 ASSAY OF VANCOMYCIN: CPT

## 2024-01-09 PROCEDURE — 6360000002 HC RX W HCPCS: Performed by: STUDENT IN AN ORGANIZED HEALTH CARE EDUCATION/TRAINING PROGRAM

## 2024-01-09 RX ORDER — ENOXAPARIN SODIUM 100 MG/ML
30 INJECTION SUBCUTANEOUS DAILY
Status: DISCONTINUED | OUTPATIENT
Start: 2024-01-10 | End: 2024-01-13 | Stop reason: HOSPADM

## 2024-01-09 RX ORDER — DIVALPROEX SODIUM 125 MG/1
250 CAPSULE, COATED PELLETS ORAL EVERY 8 HOURS SCHEDULED
Status: DISCONTINUED | OUTPATIENT
Start: 2024-01-09 | End: 2024-01-09

## 2024-01-09 RX ORDER — LEVETIRACETAM 500 MG/1
1000 TABLET ORAL 2 TIMES DAILY
Status: DISCONTINUED | OUTPATIENT
Start: 2024-01-09 | End: 2024-01-09

## 2024-01-09 RX ORDER — AMOXICILLIN AND CLAVULANATE POTASSIUM 875; 125 MG/1; MG/1
1 TABLET, FILM COATED ORAL EVERY 12 HOURS SCHEDULED
Status: DISPENSED | OUTPATIENT
Start: 2024-01-09 | End: 2024-01-12

## 2024-01-09 RX ORDER — DIVALPROEX SODIUM 125 MG/1
125 CAPSULE, COATED PELLETS ORAL EVERY 8 HOURS SCHEDULED
Status: DISCONTINUED | OUTPATIENT
Start: 2024-01-09 | End: 2024-01-09

## 2024-01-09 RX ORDER — LEVETIRACETAM 500 MG/5ML
1000 INJECTION, SOLUTION, CONCENTRATE INTRAVENOUS EVERY 12 HOURS
Status: DISCONTINUED | OUTPATIENT
Start: 2024-01-09 | End: 2024-01-13 | Stop reason: HOSPADM

## 2024-01-09 RX ORDER — VALPROIC ACID 250 MG/1
250 CAPSULE, LIQUID FILLED ORAL 3 TIMES DAILY
Status: DISCONTINUED | OUTPATIENT
Start: 2024-01-09 | End: 2024-01-09

## 2024-01-09 RX ADMIN — LEVETIRACETAM 1000 MG: 500 INJECTION, SOLUTION, CONCENTRATE INTRAVENOUS at 20:52

## 2024-01-09 RX ADMIN — VANCOMYCIN HYDROCHLORIDE 1250 MG: 10 INJECTION, POWDER, LYOPHILIZED, FOR SOLUTION INTRAVENOUS at 09:02

## 2024-01-09 RX ADMIN — AMPICILLIN SODIUM, SULBACTAM SODIUM 3000 MG: 2; 1 INJECTION, POWDER, FOR SOLUTION INTRAMUSCULAR; INTRAVENOUS at 06:33

## 2024-01-09 RX ADMIN — VALPROATE SODIUM 250 MG: 100 INJECTION, SOLUTION INTRAVENOUS at 14:11

## 2024-01-09 RX ADMIN — VALPROATE SODIUM 250 MG: 100 INJECTION, SOLUTION INTRAVENOUS at 23:20

## 2024-01-09 RX ADMIN — PANTOPRAZOLE SODIUM 40 MG: 40 INJECTION, POWDER, FOR SOLUTION INTRAVENOUS at 08:14

## 2024-01-09 RX ADMIN — VALPROATE SODIUM 250 MG: 100 INJECTION, SOLUTION INTRAVENOUS at 07:27

## 2024-01-09 RX ADMIN — SODIUM CHLORIDE, PRESERVATIVE FREE 10 ML: 5 INJECTION INTRAVENOUS at 08:14

## 2024-01-09 RX ADMIN — ENOXAPARIN SODIUM 40 MG: 100 INJECTION SUBCUTANEOUS at 08:14

## 2024-01-09 RX ADMIN — AMPICILLIN SODIUM, SULBACTAM SODIUM 3000 MG: 2; 1 INJECTION, POWDER, FOR SOLUTION INTRAMUSCULAR; INTRAVENOUS at 12:31

## 2024-01-09 RX ADMIN — Medication: at 20:52

## 2024-01-09 RX ADMIN — LEVETIRACETAM 1000 MG: 500 INJECTION, SOLUTION, CONCENTRATE INTRAVENOUS at 06:28

## 2024-01-09 RX ADMIN — Medication: at 08:14

## 2024-01-09 RX ADMIN — AMPICILLIN SODIUM, SULBACTAM SODIUM 3000 MG: 2; 1 INJECTION, POWDER, FOR SOLUTION INTRAMUSCULAR; INTRAVENOUS at 01:32

## 2024-01-09 ASSESSMENT — PAIN SCALES - GENERAL: PAINLEVEL_OUTOF10: 0

## 2024-01-09 NOTE — PROGRESS NOTES
Clinical Pharmacy Consult Note    Pharmacy was consulted by  to dose MRSA Pneumonia  for a vancomycin.    We will sign off of the case, as vancomycin has since been discontinued. Please consider consulting Pharmacy again if vancomycin is re-started.    Thank you for allowing us to participate in the care of this patient.    Piper Martin, PharmD  1/9/2024

## 2024-01-09 NOTE — PLAN OF CARE
Problem: Discharge Planning  Goal: Discharge to home or other facility with appropriate resources  1/8/2024 2226 by Clarissa Samano RN  Outcome: Progressing  Flowsheets (Taken 1/8/2024 2000)  Discharge to home or other facility with appropriate resources:   Identify barriers to discharge with patient and caregiver   Identify discharge learning needs (meds, wound care, etc)  1/8/2024 1349 by Ashley Camejo  Outcome: Progressing     Problem: Safety - Medical Restraint  Goal: Remains free of injury from restraints (Restraint for Interference with Medical Device)  Description: INTERVENTIONS:  1. Determine that other, less restrictive measures have been tried or would not be effective before applying the restraint  2. Evaluate the patient's condition at the time of restraint application  3. Inform patient/family regarding the reason for restraint  4. Q2H: Monitor safety, psychosocial status, comfort, nutrition and hydration  1/8/2024 2226 by Clarissa Samano RN  Outcome: Progressing  1/8/2024 1349 by Ashley Camejo  Outcome: Progressing  Flowsheets  Taken 1/8/2024 0600 by Jane Cummings RN  Remains free of injury from restraints (restraint for interference with medical device): Every 2 hours: Monitor safety, psychosocial status, comfort, nutrition and hydration  Taken 1/8/2024 0400 by Jane Cummings RN  Remains free of injury from restraints (restraint for interference with medical device): Every 2 hours: Monitor safety, psychosocial status, comfort, nutrition and hydration  Taken 1/8/2024 0200 by Jane Cummings RN  Remains free of injury from restraints (restraint for interference with medical device): Every 2 hours: Monitor safety, psychosocial status, comfort, nutrition and hydration     Problem: Neurosensory - Adult  Goal: Achieves stable or improved neurological status  1/8/2024 2226 by Clarissa Samano RN  Outcome: Progressing  Flowsheets (Taken 1/8/2024 2000)  Achieves stable or improved neurological

## 2024-01-09 NOTE — PROGRESS NOTES
Patient nonverbal at baseline. VSS on room air. Patient seen by SLP today and plan to continue on pureed diet and ensures. Voiding well via incontinence briefs. Currently on bedrest, requires wheelchair at baseline. Mother, Aimee and sister, Sudha updated today about plan of care.     Electronically signed by Geneva Bass RN on 1/9/2024 at 6:15 PM

## 2024-01-09 NOTE — PLAN OF CARE
Intervention: Speech Evaluation/treatment  SLP completed evaluation. Please refer to notes in EMR.

## 2024-01-09 NOTE — PROGRESS NOTES
Speech Therapy   attempt    Patient was attempted to be seen by Speech Therapy Department this date for bedside swallowing evaluation. Spoke with RN earlier this date who reported pt performs better for family members and typically drinks from a sippy cup. RN reports no concerns re:swallowing when drinking from a sippy cup.  Have checked on pt several times this morning but pt has been sleeping and no family has been present. .  Speech Therapy will re-attempt to see patient if/when patient is appropriate and available and as time permits    Shantell Javed MA, CCC-SLP [id number]  Speech-Language Pathologist  Pager  [phone]

## 2024-01-09 NOTE — PROGRESS NOTES
Speech Language Pathology  Facility/Department:[facility] ICU  Bedside Swallowing Evaluation  Name: Jose Rodríguez  : 2000  MRN: [id number]                                                         Patient Diagnosis(es):   Patient Active Problem List    Diagnosis Date Noted    Status epilepticus (HCC) 2024    Acute respiratory failure with hypoxia ([facility]) 2024    Mucoid impaction of bronchi 2024    Atelectasis of left lung 2024    Acute bronchitis 2024    Seizure ([facility]) 10/30/2023    Fever 10/30/2023    Sepsis ([facility]) 10/30/2023    Developmental delay 10/30/2023    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    History of Present Illness  Per MD notes:  Jose Rodríguez is a 23 y.o. male who presents for evaluation of seizure, unresponsiveness.  Please see Dr. Luna's note for the initial management.  Patient reportedly had a 20-minute seizure from EMS.  Patient arrived unresponsive and was given 5 mg Versed and route.  He is recently COVID-positive.  He does have history of seizure disorder.  He was hospitalized in November secondary to seizure disorder.  He is currently on Depakote and Keppra.  As far as known, patient has been compliant with his medication.  Reportedly seizures have been well-controlled over the past few years.  He has full code.  Patient was intubated upon arrival.       Imaging  XR CHEST PORTABLE   Final Result      Tubes and lines as described.      No acute cardiopulmonary findings.         XR CHEST PORTABLE

## 2024-01-09 NOTE — PLAN OF CARE
Problem: Discharge Planning  Goal: Discharge to home or other facility with appropriate resources  1/9/2024 0803 by Geneva Bass, RN  Outcome: Progressing   Pt's family involved in discharge planning. Barriers to discharge to be discussed with patient's family and case management. Discharge learning needs identified.  Case management following plan of care.    Problem: Safety - Adult  Goal: Free from fall injury  1/9/2024 0803 by Geneva Bass, RN  Outcome: Progressing   All fall precautions in place. Bed locked and in lowest position with alarm on. Overbed table and personal belonings within reach. Call light within reach and patient instructed to use call light for assistance. Non-skid socks on.    Problem: Pain  Goal: Verbalizes/displays adequate comfort level or baseline comfort level  1/9/2024 0803 by Geneva Bass, RN  Outcome: Progressing  Patient unable to verbalize pain goal, pain being monitored utilizing appropriate pain scale and managed per MAR. Being treated with PRN pain medication, rest, and frequent repositioning with pillow support for comfort and pressure relief. Pt reports some relief from pain with above interventions.

## 2024-01-09 NOTE — PROGRESS NOTES
Recommend retraction 3 cm.  There is peripheral mucoid impaction within the right lower lobar bronchus and to a lesser degree the bronchus intermedius with dense opacification in volume loss in the right middle and lower lobes.  Scattered airspace opacities are also seen in the right upper lobe with additional central airway opacification.  Left lung clear. Soft Tissues/Bones: Advanced S-shaped scoliosis of the thoracolumbar spine with Arguello rods identified from prior surgical fixation.  No acute skeletal finding in the chest. Abdomen/Pelvis: Organs: The liver, gallbladder, biliary ducts, pancreas and spleen are normal. Kidneys and adrenal glands are normal. GI/Bowel: Enteric tube is appropriately positioned in the lumen of the stomach.  Duodenum and small bowel are normal.  The colon is normal. Pelvis: Durham catheter in the lumen of the bladder.  Mucosal thickening and enhancement of the bladder.  Stable cystic change in the prostate gland.  No enlargement. Peritoneum/Retroperitoneum: The aorta tapers normally.  No lymph node enlargement. Bones/Soft Tissues: Scoliosis of the thoracolumbar spine is once again noted. No acute skeletal finding.     1. Endotracheal tube extends into the right mainstem bronchus.  Retraction 3 cm recommended. 2. Mucoid impaction within the airways in the right lung with significant atelectasis of the right middle and lower lobes and scattered airspace changes in the upper lobe. 3. Bladder mucosal thickening may indicate underlying cystitis.  A Durham catheter is present. 4. Scoliosis of the thoracolumbar spine with prior surgical change that is stable.     CT CERVICAL SPINE WO CONTRAST    Result Date: 1/5/2024  EXAMINATION: CT OF THE CERVICAL SPINE WITHOUT CONTRAST 1/5/2024 8:25 am TECHNIQUE: CT of the cervical spine was performed without the administration of intravenous contrast. Multiplanar reformatted images are provided for review. Automated exposure control, iterative  demonstrate no acute abnormality. SINUSES: The visualized paranasal sinuses and mastoid air cells demonstrate no acute abnormality. SOFT TISSUES/SKULL:  No acute abnormality of the visualized skull or soft tissues.     No acute intracranial abnormality.     XR CHEST PORTABLE    Result Date: 1/5/2024  EXAMINATION: ONE XRAY VIEW OF THE CHEST 1/5/2024 7:14 am COMPARISON: 12/27/2023 radiograph HISTORY: ORDERING SYSTEM PROVIDED HISTORY: ETT placement TECHNOLOGIST PROVIDED HISTORY: Reason for exam:->ETT placement Reason for Exam: ett tube placement and ng tube placement FINDINGS: Appropriate positioning of enteric and endotracheal tubes.  There is significant atelectasis in the right lung with dense opacification in the mid and lower zone.  Mediastinal shift towards the right has also developed in association.  Left lung clear.  Stable surgical changes in the thoracolumbar spine.     Appropriate positioning of supportive devices.  Diffuse airspace changes in association with volume loss in the right lung.       CBC:   Recent Labs     01/07/24  0628 01/08/24  0406 01/09/24  0620   WBC 10.1 12.1* 7.7   HGB 12.2* 12.3* 12.6*    248 see below     BMP:    Recent Labs     01/07/24  0628 01/08/24  0406 01/09/24  0620    139 132*   K 3.8 3.3* 4.3    100 99   CO2 18* 22 17*   BUN 13 12 10   CREATININE <0.5* <0.5* <0.5*   GLUCOSE 110* 69* 80     Hepatic:   Recent Labs     01/08/24  0406   AST 27   ALT 12   BILITOT 0.3   ALKPHOS 76     Lipids: No results found for: \"CHOL\", \"HDL\", \"TRIG\"  Hemoglobin A1C:   Lab Results   Component Value Date/Time    LABA1C 5.0 11/01/2023 05:44 AM     TSH: No results found for: \"TSH\"  Troponin: No results found for: \"TROPONINT\"  Lactic Acid:   No results for input(s): \"LACTA\" in the last 72 hours.  BNP: No results for input(s): \"PROBNP\" in the last 72 hours.  UA:  Lab Results   Component Value Date/Time    NITRU Negative 01/05/2024 07:48 AM    COLORU Straw 01/05/2024 07:48 AM    PHUR

## 2024-01-09 NOTE — PROGRESS NOTES
Clinical Pharmacy Consult Note    Vancomycin - Management by Pharmacy    Consult Date(s): 1/9/2024 7:47 AM  Consulting Provider(s): Dr. Phyllis Marquez    Assessment / Plan  MRSA Pneumonia - Vancomycin  Concurrent Antimicrobials: Unasyn (Day #5 of 7)  Day of Vanc Therapy / Ordered Duration: Day #2 of 7  Current Dosing Method: Intermittent Dosing --> Bayesian Guided AUC Dosing  Therapeutic Goal: Trough --> -600 mg/L*hr  Current Dose / Plan:   SCr remains as < 0.5 mg/dL, stable and at baseline.  Patient is non-ambulatory at baseline; will monitor closely as CrCL likely overestimate of actual renal function.  UOP not precisely documented.    Patient received a loading dose of vancomycin 750 mg IV x1 yesterday.    Level today = < 4 mg/L.    Given renal function is stable, and patient's prior experience with vancomycin, will begin a scheduled regimen today with 1250mg IV q12h.    Regimen predicts an AUC of ~493 mg/L*hr with trough ~8.2 mg/L.    Will plan to obtain a repeat level ~2 days, or sooner if clinically indicated.  Patient appears to be clinically improved.  However, BAL culture with MRSA, S to vanc with ROQUE = 2.  May consider changing to Linezolid 600mg IV q12h given high risk of failure.    Will continue to monitor clinical condition and make adjustments to regimen as appropriate    Thank you for consulting pharmacy!  Edel AlexanderD., BCPS   1/9/2024 2:41 PM  Wireless: [phone]        Interval update:  Patient remains afebrile and HDS.  Currently on room air.  Downgraded yesterday to floor.        Subjective/Objective: Jose Rodríguez is a 23 y.o. 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    Pharmacy is consulted to dose vancomycin.    Patient has prior experience with vancomycin in Oct 2023.  Weight at that time was 40kg, with stable SCr < 0.5 mg/dL.  Patient was placed on

## 2024-01-09 NOTE — PROGRESS NOTES
Pharmacist Review and Automatic Dose Adjustment of Prophylactic Enoxaparin    The reviewing pharmacist has made an adjustment to the ordered enoxaparin dose per the approved [facility] protocol and table as defined below.    Plan / Rationale: Based upon the patient's weight and renal function, the ordered dose of enoxaparin 40 mg SQ daily has been converted to enoxaparin 30 mg SQ daily.    Thank you,    Edel AlexanderD., BCPS   1/9/2024 2:57 PM  Wireless: [phone]        Jose Rodríguez is a 23 y.o. male.     Recent Labs     01/07/24  0628 01/08/24  0406 01/09/24  0620   CREATININE <0.5* <0.5* <0.5*       Estimated Creatinine Clearance: 118 mL/min (based on SCr of 0.5 mg/dL).    Recent Labs     01/08/24  0406 01/09/24  0620   HGB 12.3* 12.6*   HCT 35.7* 35.7*    see below     No results for input(s): \"INR\" in the last 72 hours.    Height:   Ht Readings from Last 1 Encounters:   01/06/24 1.346 m (4' 5\")     Weight:  Wt Readings from Last 1 Encounters:   01/09/24 36.6 kg (80 lb 11 oz)

## 2024-01-10 LAB
ALBUMIN SERPL-MCNC: 3.7 G/DL (ref 3.4–5)
ANION GAP SERPL CALCULATED.3IONS-SCNC: 11 MMOL/L (ref 3–16)
BASOPHILS # BLD: 0 K/UL (ref 0–0.2)
BASOPHILS NFR BLD: 0.1 %
BUN SERPL-MCNC: 12 MG/DL (ref 7–20)
CALCIUM SERPL-MCNC: 10.2 MG/DL (ref 8.3–10.6)
CHLORIDE SERPL-SCNC: 98 MMOL/L (ref 99–110)
CO2 SERPL-SCNC: 25 MMOL/L (ref 21–32)
CREAT SERPL-MCNC: <0.5 MG/DL (ref 0.9–1.3)
DEPRECATED RDW RBC AUTO: 13.5 % (ref 12.4–15.4)
EOSINOPHIL # BLD: 0.1 K/UL (ref 0–0.6)
EOSINOPHIL NFR BLD: 0.9 %
GFR SERPLBLD CREATININE-BSD FMLA CKD-EPI: >60 ML/MIN/{1.73_M2}
GLUCOSE SERPL-MCNC: 121 MG/DL (ref 70–99)
HCT VFR BLD AUTO: 35.1 % (ref 40.5–52.5)
HGB BLD-MCNC: 12.4 G/DL (ref 13.5–17.5)
LYMPHOCYTES # BLD: 1.6 K/UL (ref 1–5.1)
LYMPHOCYTES NFR BLD: 18.3 %
MCH RBC QN AUTO: 31.4 PG (ref 26–34)
MCHC RBC AUTO-ENTMCNC: 35.3 G/DL (ref 31–36)
MCV RBC AUTO: 89.2 FL (ref 80–100)
MONOCYTES # BLD: 0.9 K/UL (ref 0–1.3)
MONOCYTES NFR BLD: 10 %
NEUTROPHILS # BLD: 6 K/UL (ref 1.7–7.7)
NEUTROPHILS NFR BLD: 70.7 %
PHOSPHATE SERPL-MCNC: 3.1 MG/DL (ref 2.5–4.9)
PLATELET # BLD AUTO: 308 K/UL (ref 135–450)
PMV BLD AUTO: 8.3 FL (ref 5–10.5)
POTASSIUM SERPL-SCNC: 3.6 MMOL/L (ref 3.5–5.1)
RBC # BLD AUTO: 3.94 M/UL (ref 4.2–5.9)
SODIUM SERPL-SCNC: 134 MMOL/L (ref 136–145)
WBC # BLD AUTO: 8.5 K/UL (ref 4–11)

## 2024-01-10 PROCEDURE — C9113 INJ PANTOPRAZOLE SODIUM, VIA: HCPCS

## 2024-01-10 PROCEDURE — 2500000003 HC RX 250 WO HCPCS

## 2024-01-10 PROCEDURE — 36415 COLL VENOUS BLD VENIPUNCTURE: CPT

## 2024-01-10 PROCEDURE — 6360000002 HC RX W HCPCS

## 2024-01-10 PROCEDURE — 85025 COMPLETE CBC W/AUTO DIFF WBC: CPT

## 2024-01-10 PROCEDURE — 80069 RENAL FUNCTION PANEL: CPT

## 2024-01-10 PROCEDURE — 2580000003 HC RX 258

## 2024-01-10 PROCEDURE — 1200000000 HC SEMI PRIVATE

## 2024-01-10 PROCEDURE — 2580000003 HC RX 258: Performed by: STUDENT IN AN ORGANIZED HEALTH CARE EDUCATION/TRAINING PROGRAM

## 2024-01-10 PROCEDURE — 6360000002 HC RX W HCPCS: Performed by: STUDENT IN AN ORGANIZED HEALTH CARE EDUCATION/TRAINING PROGRAM

## 2024-01-10 RX ORDER — AMOXICILLIN AND CLAVULANATE POTASSIUM 875; 125 MG/1; MG/1
1 TABLET, FILM COATED ORAL EVERY 12 HOURS SCHEDULED
Qty: 5 TABLET | Refills: 0 | Status: CANCELLED
Start: 2024-01-10 | End: 2024-01-13

## 2024-01-10 RX ADMIN — ENOXAPARIN SODIUM 30 MG: 100 INJECTION SUBCUTANEOUS at 09:01

## 2024-01-10 RX ADMIN — SODIUM CHLORIDE, PRESERVATIVE FREE 10 ML: 5 INJECTION INTRAVENOUS at 09:00

## 2024-01-10 RX ADMIN — VALPROATE SODIUM 250 MG: 100 INJECTION, SOLUTION INTRAVENOUS at 13:39

## 2024-01-10 RX ADMIN — Medication: at 09:00

## 2024-01-10 RX ADMIN — PANTOPRAZOLE SODIUM 40 MG: 40 INJECTION, POWDER, FOR SOLUTION INTRAVENOUS at 09:00

## 2024-01-10 RX ADMIN — VALPROATE SODIUM 250 MG: 100 INJECTION, SOLUTION INTRAVENOUS at 05:58

## 2024-01-10 RX ADMIN — VALPROATE SODIUM 250 MG: 100 INJECTION, SOLUTION INTRAVENOUS at 21:17

## 2024-01-10 RX ADMIN — LEVETIRACETAM 1000 MG: 500 INJECTION, SOLUTION, CONCENTRATE INTRAVENOUS at 09:00

## 2024-01-10 RX ADMIN — AMPICILLIN SODIUM AND SULBACTAM SODIUM 3000 MG: 2; 1 INJECTION, POWDER, FOR SOLUTION INTRAMUSCULAR; INTRAVENOUS at 22:49

## 2024-01-10 RX ADMIN — Medication: at 21:15

## 2024-01-10 RX ADMIN — LEVETIRACETAM 1000 MG: 500 INJECTION, SOLUTION, CONCENTRATE INTRAVENOUS at 21:14

## 2024-01-10 RX ADMIN — AMPICILLIN SODIUM AND SULBACTAM SODIUM 3000 MG: 2; 1 INJECTION, POWDER, FOR SOLUTION INTRAMUSCULAR; INTRAVENOUS at 17:01

## 2024-01-10 NOTE — PROGRESS NOTES
Speech-Language Pathology    Attempted to see patient for dysphagia therapy, however pt declining PO (turning head, pushing it away, etc.). Will re-attempt as able/appropriate.    Ceci Ramirez, SLP, [id number]  Ph. # [phone]

## 2024-01-10 NOTE — PROGRESS NOTES
Physician Progress Note      PATIENT:               SOLO CARRENO  CSN #:                  [id number]  :                       2000  ADMIT DATE:       2024 3:16 PM  DISCH DATE:  RESPONDING  PROVIDER #:        Moe Robles MD          QUERY TEXT:    Patient admitted with Sepsis, aspiration pneumonia, covid infection. Noted   documentation of intubation for airway protection in the H&P and acute   respiratory failure in the bronchoscopy note. Please indicate one of the   following and document in the medical record:    The medical record reflects the following:  Risk Factors: 24 yo w/ developmental delay, covid infection, aspiration   pneumonia, seizure  Clinical Indicators: Per Bronchoscopy note:   Acute respiratory failure. Per   the H&P:  Currently intubated for airway protection. Per ICU: Intubated for   airway protection & respiratory distress.  Treatment: Intubated at [facility] and transferred to [facility].  Options provided:  -- Intubated for Acute Respiratory Failure as evidenced by, Please document   evidence.  -- Intubated for airway protection only, Acute Respiratory Failure ruled out   after study  -- Other - I will add my own diagnosis  -- Disagree - Not applicable / Not valid  -- Disagree - Clinically unable to determine / Unknown  -- Refer to Clinical Documentation Reviewer    PROVIDER RESPONSE TEXT:    This patient was intubated for airway protection only, Acute Respiratory   Failure has been ruled out after study.    Query created by: Berta Stuart on 2024 7:43 AM      Electronically signed by:  Moe Robles MD 1/10/2024 1:51 PM

## 2024-01-10 NOTE — CARE COORDINATION
CM is following for discharge planning. Reached out to antony Rodríguez's mother/legal guardian, Aimee, to explain the barrier to discharge. SLP tried to work with him again today but he is not very cooperative. Mr. Rodríguez needs to be able to take his antisizure medications PO. He continues to have dysphagia.Meds are still being given IV. Aimee expressed understanding.    When the pt is ready for DC, CM is to contact Erica Rodriguez at his group home [phone]. There is a shuttle bus available with the pt's wheelchair that can transport.    CM will touch base with Aimee tomorrow regarding status and the plan of care.    Hayley Montes, RN  [phone]

## 2024-01-10 NOTE — PROGRESS NOTES
day      Infusions:    sodium chloride       PRN Meds: dextrose bolus, 125 mL, PRN   Or  dextrose bolus, 250 mL, PRN  LORazepam, 4 mg, Q5 Min PRN  sodium chloride flush, 5-40 mL, PRN  sodium chloride, , PRN  ondansetron, 4 mg, Q8H PRN   Or  ondansetron, 4 mg, Q6H PRN  polyethylene glycol, 17 g, Daily PRN  acetaminophen, 650 mg, Q6H PRN   Or  acetaminophen, 650 mg, Q6H PRN        Labs and Imaging   XR CHEST PORTABLE    Result Date: 1/5/2024  Portable chest HISTORY: Short of breath COMPARISON: January 5, 2024 FINDINGS: Endotracheal tube and enteric tube are evident. Cardiomegaly is noted, stable. Scoliosis hardware noted. No consolidation. No pleural effusion or pneumothorax.     No acute disease. Electronically signed by Rm Michael, DO    CT CHEST ABDOMEN PELVIS W CONTRAST Additional Contrast? None    Result Date: 1/5/2024  EXAMINATION: CT OF THE CHEST, ABDOMEN, AND PELVIS WITH CONTRAST 1/5/2024 8:25 am TECHNIQUE: CT of the chest, abdomen and pelvis was performed with the administration of intravenous contrast. Multiplanar reformatted images are provided for review. Automated exposure control, iterative reconstruction, and/or weight based adjustment of the mA/kV was utilized to reduce the radiation dose to as low as reasonably achievable. COMPARISON: 10/30/2023 CT HISTORY: ORDERING SYSTEM PROVIDED HISTORY: AMS, seizure, question aspiration TECHNOLOGIST PROVIDED HISTORY: Reason for exam:->AMS, seizure, question aspiration Additional Contrast?->None Decision Support Exception - unselect if not a suspected or confirmed emergency medical condition->Emergency Medical Condition (MA) Reason for Exam: AMS, seizure, question aspiration FINDINGS: Chest: Mediastinum: Heart size is normal. Aorta and pulmonary arteries are normal. Superior mediastinum is normal. No lymph node enlargement within the chest. The esophagus tapers normally. Lungs/pleura: No endotracheal tube is positioned within the proximal aspect of the right  AM    PHUR 7.0 01/05/2024 07:48 AM    PHUR 7.0 01/05/2024 07:48 AM    WBCUA 0-2 01/05/2024 07:48 AM    RBCUA 3-4 01/05/2024 07:48 AM    CLARITYU Clear 01/05/2024 07:48 AM    SPECGRAV 1.020 01/05/2024 07:48 AM    LEUKOCYTESUR Negative 01/05/2024 07:48 AM    UROBILINOGEN 0.2 01/05/2024 07:48 AM    BILIRUBINUR Negative 01/05/2024 07:48 AM    BLOODU TRACE-INTACT 01/05/2024 07:48 AM    GLUCOSEU 100 01/05/2024 07:48 AM    KETUA Negative 01/05/2024 07:48 AM     Urine Cultures: No results found for: \"LABURIN\"  Blood Cultures:   Lab Results   Component Value Date/Time    BC No Growth after 4 days of incubation. 01/05/2024 05:49 PM     Lab Results   Component Value Date/Time    BLOODCULT2 No Growth after 4 days of incubation. 01/05/2024 05:49 PM     Organism:   Lab Results   Component Value Date/Time    ORG Staph aureus MRSA 01/05/2024 06:13 PM         Electronically signed by Moe Robles MD on 1/10/2024 at 3:34 PM

## 2024-01-10 NOTE — PLAN OF CARE
Problem: Discharge Planning  Goal: Discharge to home or other facility with appropriate resources  Outcome: Progressing  Flowsheets (Taken 1/9/2024 2000)  Discharge to home or other facility with appropriate resources:   Identify barriers to discharge with patient and caregiver   Identify discharge learning needs (meds, wound care, etc)     Problem: Safety - Medical Restraint  Goal: Remains free of injury from restraints (Restraint for Interference with Medical Device)  Description: INTERVENTIONS:  1. Determine that other, less restrictive measures have been tried or would not be effective before applying the restraint  2. Evaluate the patient's condition at the time of restraint application  3. Inform patient/family regarding the reason for restraint  4. Q2H: Monitor safety, psychosocial status, comfort, nutrition and hydration  Outcome: Progressing     Problem: Neurosensory - Adult  Goal: Achieves stable or improved neurological status  Outcome: Progressing  Flowsheets (Taken 1/9/2024 2000)  Achieves stable or improved neurological status:   Assess for and report changes in neurological status   Initiate measures to prevent increased intracranial pressure   Maintain blood pressure and fluid volume within ordered parameters to optimize cerebral perfusion and minimize risk of hemorrhage   Monitor temperature, glucose, and sodium. Initiate appropriate interventions as ordered  Goal: Absence of seizures  Outcome: Progressing  Flowsheets (Taken 1/9/2024 2000)  Absence of seizures:   Monitor for seizure activity.  If seizure occurs, document type and location of movements and any associated apnea   If seizure occurs, turn head to side and suction secretions as needed  Goal: Remains free of injury related to seizures activity  Outcome: Progressing  Flowsheets (Taken 1/9/2024 2000)  Remains free of injury related to seizure activity:   Maintain airway, patient safety  and administer oxygen as ordered   Monitor patient  assessment     Problem: Nutrition Deficit:  Goal: Optimize nutritional status  Outcome: Progressing

## 2024-01-10 NOTE — PLAN OF CARE
Problem: Discharge Planning  Goal: Discharge to home or other facility with appropriate resources  1/10/2024 1220 by Shirlene Valdivia RN  Outcome: Progressing  Flowsheets (Taken 1/9/2024 2000 by Clarissa Samano RN)  Discharge to home or other facility with appropriate resources:   Identify barriers to discharge with patient and caregiver   Identify discharge learning needs (meds, wound care, etc)  1/10/2024 0307 by Clarissa Samano RN  Outcome: Progressing  Flowsheets (Taken 1/9/2024 2000)  Discharge to home or other facility with appropriate resources:   Identify barriers to discharge with patient and caregiver   Identify discharge learning needs (meds, wound care, etc)     Problem: Neurosensory - Adult  Goal: Achieves stable or improved neurological status  1/10/2024 1220 by Shirlene Valdivia RN  Outcome: Progressing  Flowsheets (Taken 1/9/2024 2000 by Clarissa Samano RN)  Achieves stable or improved neurological status:   Assess for and report changes in neurological status   Initiate measures to prevent increased intracranial pressure   Maintain blood pressure and fluid volume within ordered parameters to optimize cerebral perfusion and minimize risk of hemorrhage   Monitor temperature, glucose, and sodium. Initiate appropriate interventions as ordered  1/10/2024 0307 by Clarissa Samano RN  Outcome: Progressing  Flowsheets (Taken 1/9/2024 2000)  Achieves stable or improved neurological status:   Assess for and report changes in neurological status   Initiate measures to prevent increased intracranial pressure   Maintain blood pressure and fluid volume within ordered parameters to optimize cerebral perfusion and minimize risk of hemorrhage   Monitor temperature, glucose, and sodium. Initiate appropriate interventions as ordered     Problem: Neurosensory - Adult  Goal: Absence of seizures  1/10/2024 1220 by Shirlene Valdivia RN  Outcome: Progressing  Flowsheets (Taken 1/9/2024 2000 by Clarissa Samano RN)  Absence  of seizures:   Monitor for seizure activity.  If seizure occurs, document type and location of movements and any associated apnea   If seizure occurs, turn head to side and suction secretions as needed  1/10/2024 0307 by Clarissa Samano RN  Outcome: Progressing  Flowsheets (Taken 1/9/2024 2000)  Absence of seizures:   Monitor for seizure activity.  If seizure occurs, document type and location of movements and any associated apnea   If seizure occurs, turn head to side and suction secretions as needed       Problem: Respiratory - Adult  Goal: Achieves optimal ventilation and oxygenation  1/10/2024 1220 by Shirlene Valdivia RN  Outcome: Progressing  Flowsheets (Taken 1/9/2024 2000 by Clarissa Samano RN)  Achieves optimal ventilation and oxygenation:   Assess for changes in respiratory status   Assess for changes in mentation and behavior  1/10/2024 0307 by Clarissa Samano RN  Outcome: Progressing  Flowsheets (Taken 1/9/2024 2000)  Achieves optimal ventilation and oxygenation:   Assess for changes in respiratory status   Assess for changes in mentation and behavior     Problem: Skin/Tissue Integrity - Adult  Goal: Skin integrity remains intact  1/10/2024 1220 by Shirlene Valdivia RN  Outcome: Progressing  Flowsheets (Taken 1/9/2024 2000 by Clarissa Samano RN)  Skin Integrity Remains Intact: Monitor for areas of redness and/or skin breakdown  1/10/2024 0307 by Clarissa Samano RN  Outcome: Progressing  Flowsheets (Taken 1/9/2024 2000)  Skin Integrity Remains Intact: Monitor for areas of redness and/or skin breakdown     Problem: Safety - Medical Restraint  Goal: Remains free of injury from restraints (Restraint for Interference with Medical Device)  Description: INTERVENTIONS:  1. Determine that other, less restrictive measures have been tried or would not be effective before applying the restraint  2. Evaluate the patient's condition at the time of restraint application  3. Inform patient/family regarding the reason

## 2024-01-10 NOTE — PROGRESS NOTES
Attempted to give patient pureed breakfast and oral med crushed in applesauce, he is unwilling to participate and will not open his mouth to eat. Supplement drink (Ensure) at bedside for patient. He is drinking this well.

## 2024-01-11 LAB
ALBUMIN SERPL-MCNC: 3.9 G/DL (ref 3.4–5)
ANION GAP SERPL CALCULATED.3IONS-SCNC: 9 MMOL/L (ref 3–16)
BASE EXCESS BLDA CALC-SCNC: 0 MMOL/L (ref -3–3)
BASOPHILS # BLD: 0 K/UL (ref 0–0.2)
BASOPHILS NFR BLD: 0.4 %
BUN SERPL-MCNC: 13 MG/DL (ref 7–20)
CALCIUM SERPL-MCNC: 10.3 MG/DL (ref 8.3–10.6)
CHLORIDE SERPL-SCNC: 97 MMOL/L (ref 99–110)
CO2 BLDA-SCNC: 25 MMOL/L
CO2 SERPL-SCNC: 28 MMOL/L (ref 21–32)
CREAT SERPL-MCNC: <0.5 MG/DL (ref 0.9–1.3)
DEPRECATED RDW RBC AUTO: 13.4 % (ref 12.4–15.4)
EOSINOPHIL # BLD: 0.1 K/UL (ref 0–0.6)
EOSINOPHIL NFR BLD: 0.9 %
GFR SERPLBLD CREATININE-BSD FMLA CKD-EPI: >60 ML/MIN/{1.73_M2}
GLUCOSE SERPL-MCNC: 117 MG/DL (ref 70–99)
HCO3 BLDA-SCNC: 23.9 MMOL/L (ref 21–29)
HCT VFR BLD AUTO: 37.7 % (ref 40.5–52.5)
HGB BLD-MCNC: 13.2 G/DL (ref 13.5–17.5)
LYMPHOCYTES # BLD: 1.2 K/UL (ref 1–5.1)
LYMPHOCYTES NFR BLD: 17 %
MCH RBC QN AUTO: 31.9 PG (ref 26–34)
MCHC RBC AUTO-ENTMCNC: 35 G/DL (ref 31–36)
MCV RBC AUTO: 91 FL (ref 80–100)
MONOCYTES # BLD: 0.6 K/UL (ref 0–1.3)
MONOCYTES NFR BLD: 8.7 %
NEUTROPHILS # BLD: 5.2 K/UL (ref 1.7–7.7)
NEUTROPHILS NFR BLD: 73 %
PCO2 BLDA: 35.7 MM HG (ref 35–45)
PERFORMED ON: ABNORMAL
PH BLDA: 7.43 [PH] (ref 7.35–7.45)
PHOSPHATE SERPL-MCNC: 3.3 MG/DL (ref 2.5–4.9)
PLATELET # BLD AUTO: 315 K/UL (ref 135–450)
PMV BLD AUTO: 8.1 FL (ref 5–10.5)
PO2 BLDA: 123.4 MM HG (ref 75–108)
POC SAMPLE TYPE: ABNORMAL
POTASSIUM SERPL-SCNC: 4.9 MMOL/L (ref 3.5–5.1)
RBC # BLD AUTO: 4.14 M/UL (ref 4.2–5.9)
SAO2 % BLDA: 99 % (ref 93–100)
SODIUM SERPL-SCNC: 134 MMOL/L (ref 136–145)
WBC # BLD AUTO: 7.2 K/UL (ref 4–11)

## 2024-01-11 PROCEDURE — 36415 COLL VENOUS BLD VENIPUNCTURE: CPT

## 2024-01-11 PROCEDURE — 6360000002 HC RX W HCPCS

## 2024-01-11 PROCEDURE — 85025 COMPLETE CBC W/AUTO DIFF WBC: CPT

## 2024-01-11 PROCEDURE — 80069 RENAL FUNCTION PANEL: CPT

## 2024-01-11 PROCEDURE — 6370000000 HC RX 637 (ALT 250 FOR IP): Performed by: STUDENT IN AN ORGANIZED HEALTH CARE EDUCATION/TRAINING PROGRAM

## 2024-01-11 PROCEDURE — 1200000000 HC SEMI PRIVATE

## 2024-01-11 PROCEDURE — 92526 ORAL FUNCTION THERAPY: CPT

## 2024-01-11 PROCEDURE — 2580000003 HC RX 258: Performed by: STUDENT IN AN ORGANIZED HEALTH CARE EDUCATION/TRAINING PROGRAM

## 2024-01-11 PROCEDURE — 2580000003 HC RX 258

## 2024-01-11 PROCEDURE — 6360000002 HC RX W HCPCS: Performed by: STUDENT IN AN ORGANIZED HEALTH CARE EDUCATION/TRAINING PROGRAM

## 2024-01-11 PROCEDURE — 2500000003 HC RX 250 WO HCPCS

## 2024-01-11 PROCEDURE — C9113 INJ PANTOPRAZOLE SODIUM, VIA: HCPCS

## 2024-01-11 RX ORDER — LEVETIRACETAM 100 MG/ML
1000 SOLUTION ORAL 2 TIMES DAILY
Status: DISCONTINUED | OUTPATIENT
Start: 2024-01-11 | End: 2024-01-13 | Stop reason: HOSPADM

## 2024-01-11 RX ORDER — DIVALPROEX SODIUM 125 MG/1
250 CAPSULE, COATED PELLETS ORAL EVERY 8 HOURS SCHEDULED
Status: DISCONTINUED | OUTPATIENT
Start: 2024-01-11 | End: 2024-01-13 | Stop reason: HOSPADM

## 2024-01-11 RX ADMIN — AMPICILLIN SODIUM AND SULBACTAM SODIUM 3000 MG: 2; 1 INJECTION, POWDER, FOR SOLUTION INTRAMUSCULAR; INTRAVENOUS at 21:59

## 2024-01-11 RX ADMIN — AMPICILLIN SODIUM AND SULBACTAM SODIUM 3000 MG: 2; 1 INJECTION, POWDER, FOR SOLUTION INTRAMUSCULAR; INTRAVENOUS at 10:09

## 2024-01-11 RX ADMIN — AMPICILLIN SODIUM AND SULBACTAM SODIUM 3000 MG: 2; 1 INJECTION, POWDER, FOR SOLUTION INTRAMUSCULAR; INTRAVENOUS at 18:50

## 2024-01-11 RX ADMIN — Medication: at 21:48

## 2024-01-11 RX ADMIN — ENOXAPARIN SODIUM 30 MG: 100 INJECTION SUBCUTANEOUS at 10:18

## 2024-01-11 RX ADMIN — LEVETIRACETAM 1000 MG: 500 SOLUTION ORAL at 21:48

## 2024-01-11 RX ADMIN — VALPROATE SODIUM 250 MG: 100 INJECTION, SOLUTION INTRAVENOUS at 05:28

## 2024-01-11 RX ADMIN — LEVETIRACETAM 1000 MG: 500 INJECTION, SOLUTION, CONCENTRATE INTRAVENOUS at 10:18

## 2024-01-11 RX ADMIN — Medication: at 10:19

## 2024-01-11 RX ADMIN — AMPICILLIN SODIUM AND SULBACTAM SODIUM 3000 MG: 2; 1 INJECTION, POWDER, FOR SOLUTION INTRAMUSCULAR; INTRAVENOUS at 04:51

## 2024-01-11 RX ADMIN — SODIUM CHLORIDE, PRESERVATIVE FREE 10 ML: 5 INJECTION INTRAVENOUS at 10:19

## 2024-01-11 RX ADMIN — PANTOPRAZOLE SODIUM 40 MG: 40 INJECTION, POWDER, FOR SOLUTION INTRAVENOUS at 10:18

## 2024-01-11 RX ADMIN — DIVALPROEX SODIUM 250 MG: 125 CAPSULE, COATED PELLETS ORAL at 15:14

## 2024-01-11 ASSESSMENT — PAIN SCALES - GENERAL
PAINLEVEL_OUTOF10: 0
PAINLEVEL_OUTOF10: 3
PAINLEVEL_OUTOF10: 0

## 2024-01-11 NOTE — PLAN OF CARE
Progressing  Flowsheets (Taken 1/10/2024 2000)  Verbalizes/displays adequate comfort level or baseline comfort level:   Encourage patient to monitor pain and request assistance   Assess pain using appropriate pain scale   Administer analgesics based on type and severity of pain and evaluate response   Implement non-pharmacological measures as appropriate and evaluate response     Problem: Skin/Tissue Integrity  Goal: Absence of new skin breakdown  Description: 1.  Monitor for areas of redness and/or skin breakdown  2.  Assess vascular access sites hourly  3.  Every 4-6 hours minimum:  Change oxygen saturation probe site  4.  Every 4-6 hours:  If on nasal continuous positive airway pressure, respiratory therapy assess nares and determine need for appliance change or resting period.  Outcome: Progressing     Problem: ABCDS Injury Assessment  Goal: Absence of physical injury  Outcome: Progressing  Flowsheets (Taken 1/10/2024 2000)  Absence of Physical Injury: Implement safety measures based on patient assessment     Problem: Nutrition Deficit:  Goal: Optimize nutritional status  Outcome: Progressing

## 2024-01-11 NOTE — PROGRESS NOTES
Comprehensive Nutrition Assessment    RECOMMENDATIONS:  PO Diet: Continue dysphagia - pureed diet  ONS: Continue Ensures TID (extra provided on unit)  Nutrition Education: Education not appropriate     NUTRITION ASSESSMENT:   Nutritional summary & status: Pt advanced to dysphagia - pureed diet, however pt is refusing meals. He has been drinking x3 Ensures daily w/ his sippy cup that his sister brought in. Per family he likely will refuse PO when a stranger is feeding him. He does usually take minimal PO but always drinks his Ensures, so while intakes are poor he does appear to be close to his baseline. Provided extra Ensures on unit for pt, RN aware. Plan to continue his baseline diet for pt comfort (no nutrition support).   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    MALNUTRITION ASSESSMENT  Context of Malnutrition: Chronic Illness   Malnutrition Status: Moderate malnutrition  Findings of the 6 clinical characteristics of malnutrition (Minimum of 2 out of 6 clinical characteristics is required to make the diagnosis of moderate or severe Protein Calorie Malnutrition based on AND/ASPEN Guidelines):  Energy Intake:  Mild decrease in energy intake (Comment)  Weight Loss:  No significant weight loss     Body Fat Loss:  Mild body fat loss Buccal region   Muscle Mass Loss:  Mild muscle mass loss Clavicles (pectoralis & deltoids), Temples (temporalis)  Fluid Accumulation:  Mild      NUTRITION DIAGNOSIS   Inadequate oral intake related to cognitive or neurological impairment as evidenced by intake 0-25%    Nutrition Monitoring and Evaluation:   Food/Nutrient Intake Outcomes:  Food and Nutrient Intake, Supplement Intake  Physical Signs/Symptoms Outcomes:  Biochemical Data, Nutrition Focused Physical Findings, Weight     OBJECTIVE DATA: Significant to nutrition assessment  Nutrition Related Findings: Na 134. . Hypoactive bowel sounds. +BM 1/10. +5L.  Wounds:  None  Nutrition Goals: Initiate nutrition support, Initiate PO diet     CURRENT NUTRITION THERAPIES  ADULT DIET; Dysphagia - Pureed  ADULT ORAL NUTRITION SUPPLEMENT; Breakfast, Lunch, Dinner; Standard High Calorie/High Protein Oral Supplement  PO Intake: 1-25%   PO Supplement Intake:%  Additional Sources of Calories/IVF:N/A     COMPARATIVE STANDARDS  Energy (kcal):  800-1000 (20-25)     Protein (g):  57-68 (1.5-1.8)       Fluid (ml/day):  1 mL/kcal or per MD    ANTHROPOMETRICS  Current Height: 134.6 cm (4' 5\")  Current Weight - Scale: 38.6 kg (85 lb 1.6 oz)    Admission weight: 38 kg (83 lb 12.4 oz)    The patient will be monitored per nutrition standards of care. Consult dietitian if additional nutrition interventions are needed prior to RD reassessment.     Melany Espinoza RD, LD  [place]:  [phone]

## 2024-01-11 NOTE — PROGRESS NOTES
Speech Language Pathology  Facility/Department:[facility]  Dysphagia treatment  Discharge   Name: Jose Rodríguez  : 2000  MRN: [id number]                                                         Patient Diagnosis(es):   Patient Active Problem List    Diagnosis Date Noted    Status epilepticus ([facility]) 2024    Acute respiratory failure with hypoxia ([facility]) 2024    Mucoid impaction of bronchi 2024    Atelectasis of left lung 2024    Acute bronchitis 2024    Seizure ([facility]) 10/30/2023    Fever 10/30/2023    Sepsis ([facility]) 10/30/2023    Developmental delay 10/30/2023    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    History of Present Illness  Per MD notes:  Jose Rodríguez is a 23 y.o. male who presents for evaluation of seizure, unresponsiveness.  Please see Dr. Luna's note for the initial management.  Patient reportedly had a 20-minute seizure from EMS.  Patient arrived unresponsive and was given 5 mg Versed and route.  He is recently COVID-positive.  He does have history of seizure disorder.  He was hospitalized in November secondary to seizure disorder.  He is currently on Depakote and Keppra.  As far as known, patient has been compliant with his medication.  Reportedly seizures have been well-controlled over the past few years.  He has full code.  Patient was intubated upon arrival.       Imaging  XR CHEST PORTABLE   Final Result      Tubes and lines as described.      No acute cardiopulmonary findings.         XR CHEST PORTABLE        Treatment:  Dysphagia Goals:  The pt will be seen  1-3 x to address the following goals:   Goal 1: Patient will tolerate least restrictive diet with adequate oral prep and without overt signs of aspiration or associated decline in respiratory status.  1/11- goal partially met-  per RN and chart review, pt tolerating current diet and lungs are clear.  On this date, pt only willing to take one sip of Ensure via sippy cup. Pt with delayed cough, but pt also noted to cough not when consuming food/drink.  Pt would not take additional trials of Ensure (attempted chocolate and vanilla) and would not take trials of vanilla pudding, despite max encouragement. Per RN on Tuesday, family reported that pt is back to his baseline. Because of this, will discharge from Speech Therapy       Education  1/9 and 1/11- pt educated to the purpose of the visit- no evidence of comprehension. No family present at this time       Total treatment time: 10 min dysphagia treatment     Plan: pt discharged from speech therapy as lungs are clear and pt is back to baseline per discussing with RN on 1/9.    Recommended Diet: pureed with thin by sippy cup  Medication administration: crushed in puree     Strategies:   Upright as much as possible  Liquids by sippy cup  1:1 assistance with meals  Discharge Recommendation: No further follow up indicated   Discussed with Ry HARRY   Needs met prior to leaving room, call light within reach    Shantell Javed MA, CCC-SLP [id number]  Speech-Language Pathologist  Pager  [phone]    This document will serve as a dc summary if pt dc prior to next visit

## 2024-01-11 NOTE — PROGRESS NOTES
V2.0    [facility] Progress Note      Name:  Jose Rodríguez /Age/Sex: 2000  (23 y.o. male)   MRN & CSN:  [id number] & [id number] Encounter Date/Time: 2024 9:01 AM EST   Location:  [facility]/4506-01 PCP: No primary care provider on file.     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  Patient was intubated for airway protection.  Was admitted with status epilepticus with aspiration pneumonia.  Underwent bronchoscopy on admission.  Extubated on 2024.  Neurology consulted.  On Keppra and on a higher dose of Depakote as Depakote value was low on admission.    Plan:     Seizure disorder  -Neurology consulted  -Continue Keppra 1000 mg bid and  mg tid  --converting again to PO form  -SLP evaluated, continue pureed diet        Aspiration and MRSA pneumonia with mucous plugging,   -Complete Unasyn today  -S/p bronchoscopy on 2024.  Bronchoscopy culture positive for MRSA  -CT chest shows mucous plugging with resultant middle and lower lobe with airspace disease on admission  -Procalcitonin elevated patient  -Stop vancomycin, present in BAL but thought to be colonizer and not contributing to PNA per Pulm/Crit service      Sepsis -present on admission-improving, WBC trending down  -With tachypnea leukocytosis and tachycardia  -Blood cultures NGTD        Recent COVID infection  -Diagnosed on 10/27  -Positive on admission     Developmental elevated NP partial trisomy  -Lives in a group home at baseline alert, minimally verbal does not walk    Goals of care discussed with sister.  Patient is a full code    Diet ADULT DIET; Dysphagia - Pureed  ADULT ORAL NUTRITION SUPPLEMENT; Breakfast, Lunch, Dinner; Standard High Calorie/High Protein Oral Supplement   DVT Prophylaxis [x]

## 2024-01-12 LAB
ALBUMIN SERPL-MCNC: 3.8 G/DL (ref 3.4–5)
ANION GAP SERPL CALCULATED.3IONS-SCNC: 15 MMOL/L (ref 3–16)
BASOPHILS # BLD: 0 K/UL (ref 0–0.2)
BASOPHILS NFR BLD: 0.3 %
BUN SERPL-MCNC: 11 MG/DL (ref 7–20)
CALCIUM SERPL-MCNC: 10.7 MG/DL (ref 8.3–10.6)
CHLORIDE SERPL-SCNC: 97 MMOL/L (ref 99–110)
CO2 SERPL-SCNC: 21 MMOL/L (ref 21–32)
CREAT SERPL-MCNC: <0.5 MG/DL (ref 0.9–1.3)
DEPRECATED RDW RBC AUTO: 13.8 % (ref 12.4–15.4)
EOSINOPHIL # BLD: 0.1 K/UL (ref 0–0.6)
EOSINOPHIL NFR BLD: 0.8 %
GFR SERPLBLD CREATININE-BSD FMLA CKD-EPI: >60 ML/MIN/{1.73_M2}
GLUCOSE SERPL-MCNC: 116 MG/DL (ref 70–99)
HCT VFR BLD AUTO: 38.6 % (ref 40.5–52.5)
HGB BLD-MCNC: 13.4 G/DL (ref 13.5–17.5)
LYMPHOCYTES # BLD: 1.4 K/UL (ref 1–5.1)
LYMPHOCYTES NFR BLD: 16.3 %
MCH RBC QN AUTO: 31.2 PG (ref 26–34)
MCHC RBC AUTO-ENTMCNC: 34.7 G/DL (ref 31–36)
MCV RBC AUTO: 90 FL (ref 80–100)
MONOCYTES # BLD: 0.9 K/UL (ref 0–1.3)
MONOCYTES NFR BLD: 10.2 %
NEUTROPHILS # BLD: 6 K/UL (ref 1.7–7.7)
NEUTROPHILS NFR BLD: 72.4 %
PHOSPHATE SERPL-MCNC: 3.9 MG/DL (ref 2.5–4.9)
PLATELET # BLD AUTO: 315 K/UL (ref 135–450)
PMV BLD AUTO: 8.2 FL (ref 5–10.5)
POTASSIUM SERPL-SCNC: 4.2 MMOL/L (ref 3.5–5.1)
RBC # BLD AUTO: 4.29 M/UL (ref 4.2–5.9)
SODIUM SERPL-SCNC: 133 MMOL/L (ref 136–145)
WBC # BLD AUTO: 8.3 K/UL (ref 4–11)

## 2024-01-12 PROCEDURE — 2580000003 HC RX 258

## 2024-01-12 PROCEDURE — 2500000003 HC RX 250 WO HCPCS: Performed by: STUDENT IN AN ORGANIZED HEALTH CARE EDUCATION/TRAINING PROGRAM

## 2024-01-12 PROCEDURE — 6360000002 HC RX W HCPCS: Performed by: STUDENT IN AN ORGANIZED HEALTH CARE EDUCATION/TRAINING PROGRAM

## 2024-01-12 PROCEDURE — 2580000003 HC RX 258: Performed by: STUDENT IN AN ORGANIZED HEALTH CARE EDUCATION/TRAINING PROGRAM

## 2024-01-12 PROCEDURE — C9113 INJ PANTOPRAZOLE SODIUM, VIA: HCPCS

## 2024-01-12 PROCEDURE — 2060000000 HC ICU INTERMEDIATE R&B

## 2024-01-12 PROCEDURE — 85025 COMPLETE CBC W/AUTO DIFF WBC: CPT

## 2024-01-12 PROCEDURE — 6370000000 HC RX 637 (ALT 250 FOR IP): Performed by: STUDENT IN AN ORGANIZED HEALTH CARE EDUCATION/TRAINING PROGRAM

## 2024-01-12 PROCEDURE — 80069 RENAL FUNCTION PANEL: CPT

## 2024-01-12 PROCEDURE — 6360000002 HC RX W HCPCS

## 2024-01-12 PROCEDURE — 36415 COLL VENOUS BLD VENIPUNCTURE: CPT

## 2024-01-12 RX ADMIN — SODIUM CHLORIDE, PRESERVATIVE FREE 10 ML: 5 INJECTION INTRAVENOUS at 09:24

## 2024-01-12 RX ADMIN — AMPICILLIN SODIUM AND SULBACTAM SODIUM 3000 MG: 2; 1 INJECTION, POWDER, FOR SOLUTION INTRAMUSCULAR; INTRAVENOUS at 03:51

## 2024-01-12 RX ADMIN — LEVETIRACETAM 1000 MG: 500 SOLUTION ORAL at 20:46

## 2024-01-12 RX ADMIN — VALPROATE SODIUM 250 MG: 100 INJECTION, SOLUTION INTRAVENOUS at 14:26

## 2024-01-12 RX ADMIN — ENOXAPARIN SODIUM 30 MG: 100 INJECTION SUBCUTANEOUS at 09:24

## 2024-01-12 RX ADMIN — AMPICILLIN SODIUM AND SULBACTAM SODIUM 3000 MG: 2; 1 INJECTION, POWDER, FOR SOLUTION INTRAMUSCULAR; INTRAVENOUS at 10:19

## 2024-01-12 RX ADMIN — LEVETIRACETAM 1000 MG: 500 SOLUTION ORAL at 09:23

## 2024-01-12 RX ADMIN — Medication: at 09:23

## 2024-01-12 RX ADMIN — Medication: at 20:45

## 2024-01-12 RX ADMIN — DIVALPROEX SODIUM 250 MG: 125 CAPSULE, COATED PELLETS ORAL at 14:26

## 2024-01-12 RX ADMIN — DIVALPROEX SODIUM 250 MG: 125 CAPSULE, COATED PELLETS ORAL at 20:45

## 2024-01-12 RX ADMIN — VALPROATE SODIUM 250 MG: 100 INJECTION, SOLUTION INTRAVENOUS at 20:48

## 2024-01-12 RX ADMIN — PANTOPRAZOLE SODIUM 40 MG: 40 INJECTION, POWDER, FOR SOLUTION INTRAVENOUS at 09:23

## 2024-01-12 ASSESSMENT — PAIN SCALES - GENERAL: PAINLEVEL_OUTOF10: 0

## 2024-01-12 NOTE — PROGRESS NOTES
Pt alert to self. No acute neuro changes this shift. Pt tolerating ensure drinks and very minimal solid food with assist. Pt took medication using chocolate pudding and prompting with assist. Pt voiding adequately via incontinence. Pt had 1 BM this shift. Pt on specialty mattress, pt frequently repositioned self in bed this shift. All fall precautions are in place, call light within pt reach.

## 2024-01-12 NOTE — PROGRESS NOTES
4 Eyes Skin Assessment     NAME:  Jose Rodríguez  YOB: 2000  MEDICAL RECORD NUMBER:  [id number]    The patient is being assessed for  Admission    I agree that at least one RN has performed a thorough Head to Toe Skin Assessment on the patient. ALL assessment sites listed below have been assessed.      Areas assessed by both nurses:    Head, Face, Ears, Shoulders, Back, Chest, Arms, Elbows, Hands, Sacrum. Buttock, Coccyx, Ischium, Legs. Feet and Heels, and Under Medical Devices     Scattered bruising.  Scrotum red/excoriated  Buttocks red/excoriated/blanchable  Scattered red rash          Does the Patient have a Wound? No noted wound(s)       Priyank Prevention initiated by RN: Yes  Wound Care Orders initiated by RN: No    Pressure Injury (Stage 3,4, Unstageable, DTI, NWPT, and Complex wounds) if present, place Wound referral order by RN under : No    New Ostomies, if present place, Ostomy referral order under : No     Nurse 1 eSignature: Electronically signed by Lacy Butt RN on 1/12/24 at 12:55 AM EST    **SHARE this note so that the co-signing nurse can place an eSignature**    Nurse 2 eSignature: Electronically signed by Aimee Monae RN on 1/12/24 at 12:58 AM EST

## 2024-01-12 NOTE — PROGRESS NOTES
Pt transferred off unit with all belongings to room 5511. Report given and pt received by Lacy HARRY.

## 2024-01-12 NOTE — PROGRESS NOTES
V2.0    [facility]      Name:  Jose Rodríguez /Age/Sex: 2000  (23 y.o. male)   MRN & CSN:  [id number] & [id number] Encounter Date/Time: 2024 9:01 AM EST   Location:  [facility] PCP: No primary care provider on file.     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  Patient was intubated for airway protection.  Was admitted with status epilepticus with aspiration pneumonia.  Underwent bronchoscopy on admission.  Extubated on 2024.  Neurology consulted.  On Keppra and on a higher dose of Depakote as Depakote value was low on admission.    Plan:     Seizure disorder  -Neurology consulted  -Continue Keppra 1000 mg bid and  mg tid  --converting again to PO form seems to be tolerating better  -SLP evaluated, continue pureed diet        Aspiration and MRSA pneumonia with mucous plugging,   -Complete Unasyn today  -S/p bronchoscopy on 2024.  Bronchoscopy culture positive for MRSA  -CT chest shows mucous plugging with resultant middle and lower lobe with airspace disease on admission  -Procalcitonin elevated patient  -Stop vancomycin, present in BAL but thought to be colonizer and not contributing to PNA per Pulm/Crit service      Sepsis -present on admission-improving, WBC trending down  -With tachypnea leukocytosis and tachycardia  -Blood cultures NGTD        Recent COVID infection  -Diagnosed on 10/27  -Positive on admission     Developmental elevated NP partial trisomy  -Lives in a group home at baseline alert, minimally verbal does not walk    Goals of care discussed with sister.  Patient is a full code    Diet ADULT DIET; Dysphagia - Pureed  ADULT ORAL NUTRITION SUPPLEMENT; Breakfast, Lunch, Dinner; Standard High Calorie/High Protein Oral  parenchymal attenuation is normal. No mass lesions on this noncontrast study. ORBITS: The visualized portion of the orbits demonstrate no acute abnormality. SINUSES: The visualized paranasal sinuses and mastoid air cells demonstrate no acute abnormality. SOFT TISSUES/SKULL:  No acute abnormality of the visualized skull or soft tissues.     No acute intracranial abnormality.     XR CHEST PORTABLE    Result Date: 1/5/2024  EXAMINATION: ONE XRAY VIEW OF THE CHEST 1/5/2024 7:14 am COMPARISON: 12/27/2023 radiograph HISTORY: ORDERING SYSTEM PROVIDED HISTORY: ETT placement TECHNOLOGIST PROVIDED HISTORY: Reason for exam:->ETT placement Reason for Exam: ett tube placement and ng tube placement FINDINGS: Appropriate positioning of enteric and endotracheal tubes.  There is significant atelectasis in the right lung with dense opacification in the mid and lower zone.  Mediastinal shift towards the right has also developed in association.  Left lung clear.  Stable surgical changes in the thoracolumbar spine.     Appropriate positioning of supportive devices.  Diffuse airspace changes in association with volume loss in the right lung.       CBC:   Recent Labs     01/10/24  0351 01/11/24  0349 01/12/24  0617   WBC 8.5 7.2 8.3   HGB 12.4* 13.2* 13.4*    315 315     BMP:    Recent Labs     01/10/24  0351 01/11/24  0349 01/12/24  0617   * 134* 133*   K 3.6 4.9 4.2   CL 98* 97* 97*   CO2 25 28 21   BUN 12 13 11   CREATININE <0.5* <0.5* <0.5*   GLUCOSE 121* 117* 116*     Hepatic:   No results for input(s): \"AST\", \"ALT\", \"ALB\", \"BILITOT\", \"ALKPHOS\" in the last 72 hours.    Lipids: No results found for: \"CHOL\", \"HDL\", \"TRIG\"  Hemoglobin A1C:   Lab Results   Component Value Date/Time    LABA1C 5.0 11/01/2023 05:44 AM     TSH: No results found for: \"TSH\"  Troponin: No results found for: \"TROPONINT\"  Lactic Acid:   No results for input(s): \"LACTA\" in the last 72 hours.  BNP: No results for input(s): \"PROBNP\" in the last 72

## 2024-01-12 NOTE — CARE COORDINATION
VERONICA spoke with Sunny at pt's group home [phone].  They will be available over the weekend if patient becomes ready for discharge.  Pamela's RN Jennifer also available over the weekend [phone].      VERONICA left voicemail for patient's mother legal guardian Aimee.     Melina Torres RN, BSN,   5T Ortho/Neuro   [phone]

## 2024-01-12 NOTE — PLAN OF CARE
Problem: Discharge Planning  Goal: Discharge to home or other facility with appropriate resources  Outcome: Progressing     Problem: Neurosensory - Adult  Goal: Achieves stable or improved neurological status  Outcome: Progressing  Flowsheets  Taken 1/11/2024 1600 by Ry Holliday RN  Achieves stable or improved neurological status: Assess for and report changes in neurological status  Taken 1/11/2024 1200 by Ry Holliday RN  Achieves stable or improved neurological status: Assess for and report changes in neurological status  Taken 1/11/2024 0800 by Ry Holliday RN  Achieves stable or improved neurological status: Assess for and report changes in neurological status     Problem: Neurosensory - Adult  Goal: Absence of seizures  Outcome: Progressing  Flowsheets  Taken 1/11/2024 1600 by Ry Holliday RN  Absence of seizures: Monitor for seizure activity.  If seizure occurs, document type and location of movements and any associated apnea  Taken 1/11/2024 1200 by Ry Holliday RN  Absence of seizures: Monitor for seizure activity.  If seizure occurs, document type and location of movements and any associated apnea  Taken 1/11/2024 0800 by Ry Holliday RN  Absence of seizures: Monitor for seizure activity.  If seizure occurs, document type and location of movements and any associated apnea     Problem: Neurosensory - Adult  Goal: Remains free of injury related to seizures activity  Outcome: Progressing  Flowsheets  Taken 1/11/2024 1600 by Ry Holliday RN  Remains free of injury related to seizure activity: Maintain airway, patient safety  and administer oxygen as ordered  Taken 1/11/2024 1200 by Ry Holliday RN  Remains free of injury related to seizure activity: Maintain airway, patient safety  and administer oxygen as ordered  Taken 1/11/2024 0800 by Ry Holliday RN  Remains free of injury related to seizure activity: Maintain airway, patient safety  and administer oxygen as ordered     Problem:  retention  Outcome: Progressing     Problem: Genitourinary - Adult  Goal: Urinary catheter remains patent  Outcome: Progressing     Problem: Infection - Adult  Goal: Absence of infection at discharge  Outcome: Progressing  Flowsheets  Taken 1/11/2024 0800 by Ry Holliday RN  Absence of infection at discharge: Assess and monitor for signs and symptoms of infection  Taken 1/11/2024 0752 by Ry Holliday RN  Absence of infection at discharge: Assess and monitor for signs and symptoms of infection     Problem: Infection - Adult  Goal: Absence of infection during hospitalization  Outcome: Progressing  Flowsheets  Taken 1/11/2024 0800 by Ry Holliday RN  Absence of infection during hospitalization: Assess and monitor for signs and symptoms of infection  Taken 1/11/2024 0752 by Ry Holliday RN  Absence of infection during hospitalization: Assess and monitor for signs and symptoms of infection     Problem: Infection - Adult  Goal: Absence of fever/infection during anticipated neutropenic period  Outcome: Progressing  Flowsheets  Taken 1/11/2024 0800 by Ry Holliday RN  Absence of fever/infection during anticipated neutropenic period: Monitor white blood cell count  Taken 1/11/2024 0752 by Ry Holliday RN  Absence of fever/infection during anticipated neutropenic period: Monitor white blood cell count     Problem: Safety - Adult  Goal: Free from fall injury  Outcome: Progressing     Problem: Pain  Goal: Verbalizes/displays adequate comfort level or baseline comfort level  Outcome: Progressing  Flowsheets (Taken 1/11/2024 0800 by Ry Holliday RN)  Verbalizes/displays adequate comfort level or baseline comfort level: Encourage patient to monitor pain and request assistance     Problem: Skin/Tissue Integrity  Goal: Absence of new skin breakdown  Description: 1.  Monitor for areas of redness and/or skin breakdown  2.  Assess vascular access sites hourly  3.  Every 4-6 hours minimum:  Change oxygen saturation probe

## 2024-01-12 NOTE — PLAN OF CARE
Problem: Discharge Planning  Goal: Discharge to home or other facility with appropriate resources  Outcome: Progressing   Pt involved in discharge planning. Barriers to discharge discussed with pt. Discharge learning needs identified. Discussed with pt any additional needed resources and transportation plans.      Problem: Safety - Adult  Goal: Free from fall injury  Outcome: Progressing   All fall precautions in place. Bed locked and in lowest position with alarm on. Overbed table and personal belonings within reach. Call light within reach and patient instructed to use call light for assistance. Non-skid socks on.

## 2024-01-13 VITALS
TEMPERATURE: 98.4 F | SYSTOLIC BLOOD PRESSURE: 152 MMHG | BODY MASS INDEX: 16.71 KG/M2 | DIASTOLIC BLOOD PRESSURE: 78 MMHG | HEART RATE: 98 BPM | WEIGHT: 85.1 LBS | HEIGHT: 60 IN | OXYGEN SATURATION: 94 % | RESPIRATION RATE: 16 BRPM

## 2024-01-13 LAB
ALBUMIN SERPL-MCNC: 4.2 G/DL (ref 3.4–5)
ANION GAP SERPL CALCULATED.3IONS-SCNC: 11 MMOL/L (ref 3–16)
BASOPHILS # BLD: 0 K/UL (ref 0–0.2)
BASOPHILS NFR BLD: 0.2 %
BUN SERPL-MCNC: 14 MG/DL (ref 7–20)
CALCIUM SERPL-MCNC: 10.7 MG/DL (ref 8.3–10.6)
CHLORIDE SERPL-SCNC: 96 MMOL/L (ref 99–110)
CO2 SERPL-SCNC: 28 MMOL/L (ref 21–32)
CREAT SERPL-MCNC: <0.5 MG/DL (ref 0.9–1.3)
DEPRECATED RDW RBC AUTO: 13.5 % (ref 12.4–15.4)
EOSINOPHIL # BLD: 0.1 K/UL (ref 0–0.6)
EOSINOPHIL NFR BLD: 0.9 %
GFR SERPLBLD CREATININE-BSD FMLA CKD-EPI: >60 ML/MIN/{1.73_M2}
GLUCOSE SERPL-MCNC: 106 MG/DL (ref 70–99)
HCT VFR BLD AUTO: 40.7 % (ref 40.5–52.5)
HGB BLD-MCNC: 13.9 G/DL (ref 13.5–17.5)
LYMPHOCYTES # BLD: 1.8 K/UL (ref 1–5.1)
LYMPHOCYTES NFR BLD: 21.5 %
MCH RBC QN AUTO: 30.7 PG (ref 26–34)
MCHC RBC AUTO-ENTMCNC: 34.1 G/DL (ref 31–36)
MCV RBC AUTO: 90.2 FL (ref 80–100)
MONOCYTES # BLD: 0.9 K/UL (ref 0–1.3)
MONOCYTES NFR BLD: 11.2 %
NEUTROPHILS # BLD: 5.5 K/UL (ref 1.7–7.7)
NEUTROPHILS NFR BLD: 66.2 %
PHOSPHATE SERPL-MCNC: 3.8 MG/DL (ref 2.5–4.9)
PLATELET # BLD AUTO: 335 K/UL (ref 135–450)
PMV BLD AUTO: 7.6 FL (ref 5–10.5)
POTASSIUM SERPL-SCNC: 4.9 MMOL/L (ref 3.5–5.1)
RBC # BLD AUTO: 4.52 M/UL (ref 4.2–5.9)
SODIUM SERPL-SCNC: 135 MMOL/L (ref 136–145)
WBC # BLD AUTO: 8.3 K/UL (ref 4–11)

## 2024-01-13 PROCEDURE — 6370000000 HC RX 637 (ALT 250 FOR IP): Performed by: STUDENT IN AN ORGANIZED HEALTH CARE EDUCATION/TRAINING PROGRAM

## 2024-01-13 PROCEDURE — 36415 COLL VENOUS BLD VENIPUNCTURE: CPT

## 2024-01-13 PROCEDURE — C9113 INJ PANTOPRAZOLE SODIUM, VIA: HCPCS

## 2024-01-13 PROCEDURE — 2580000003 HC RX 258: Performed by: STUDENT IN AN ORGANIZED HEALTH CARE EDUCATION/TRAINING PROGRAM

## 2024-01-13 PROCEDURE — 85025 COMPLETE CBC W/AUTO DIFF WBC: CPT

## 2024-01-13 PROCEDURE — 2500000003 HC RX 250 WO HCPCS: Performed by: STUDENT IN AN ORGANIZED HEALTH CARE EDUCATION/TRAINING PROGRAM

## 2024-01-13 PROCEDURE — 6360000002 HC RX W HCPCS

## 2024-01-13 PROCEDURE — 80069 RENAL FUNCTION PANEL: CPT

## 2024-01-13 RX ORDER — DIVALPROEX SODIUM 125 MG/1
250 CAPSULE, COATED PELLETS ORAL 3 TIMES DAILY
Qty: 180 CAPSULE | Refills: 0
Start: 2024-01-13 | End: 2024-02-12

## 2024-01-13 RX ORDER — PANTOPRAZOLE SODIUM 20 MG/1
40 TABLET, DELAYED RELEASE ORAL DAILY
Qty: 60 TABLET | Refills: 0
Start: 2024-01-13 | End: 2024-02-12

## 2024-01-13 RX ORDER — LEVETIRACETAM 100 MG/ML
1000 SOLUTION ORAL 2 TIMES DAILY
Qty: 600 ML | Refills: 0
Start: 2024-01-13 | End: 2024-02-12

## 2024-01-13 RX ADMIN — Medication: at 09:45

## 2024-01-13 RX ADMIN — VALPROATE SODIUM 250 MG: 100 INJECTION, SOLUTION INTRAVENOUS at 14:41

## 2024-01-13 RX ADMIN — PANTOPRAZOLE SODIUM 40 MG: 40 INJECTION, POWDER, FOR SOLUTION INTRAVENOUS at 09:46

## 2024-01-13 RX ADMIN — ENOXAPARIN SODIUM 30 MG: 100 INJECTION SUBCUTANEOUS at 09:46

## 2024-01-13 RX ADMIN — VALPROATE SODIUM 250 MG: 100 INJECTION, SOLUTION INTRAVENOUS at 05:35

## 2024-01-13 RX ADMIN — DIVALPROEX SODIUM 250 MG: 125 CAPSULE, COATED PELLETS ORAL at 05:36

## 2024-01-13 RX ADMIN — DIVALPROEX SODIUM 250 MG: 125 CAPSULE, COATED PELLETS ORAL at 14:13

## 2024-01-13 RX ADMIN — LEVETIRACETAM 1000 MG: 500 SOLUTION ORAL at 09:46

## 2024-01-13 NOTE — PROGRESS NOTES
Pt is alert. Unable to determine orientation due to pt non verbal.Oral keppra given PO, no issues. Pt did not eat any breakfast, attempted to feed him 1:1 multiple times. Did drink 100% of ensure. Spoke with mother on phone, will notify if pt is going to discharge. No signs of pain or discomfort. Pt turns self in bed. Incontinent of bowel and bladder. Seizure precautions in place. All safety measures in place call device within reach

## 2024-01-13 NOTE — PROGRESS NOTES
Pt discharged back to facility, paperwork sent with facility transportation, Sunny. Mother notified of pt being discharged, reviewed discharge instructions with her via phone. Denied any further questions. All belongings sent with pt, PIV x2 removed.

## 2024-01-13 NOTE — DISCHARGE INSTR - COC
Continuity of Care Form    Patient Name: Jose Rodríguez   :  2000  MRN:  [id number]    Admit date:  2024  Discharge date:  ***    Code Status Order: Full Code   Advance Directives:     Admitting Physician:  Ben Gross MD  PCP: No primary care provider on file.    Discharging Nurse: ***  Discharging Hospital Unit/Room#: 5511/5511-01  Discharging Unit Phone Number: ***    Emergency Contact:   Extended Emergency Contact Information  Primary Emergency Contact: Aimee Delacruz  Home Phone: [phone]  Mobile Phone: [phone]  Relation: Parent   needed? No  Secondary Emergency Contact: Sudha Rodríguez  Home Phone: [phone]  Work Phone: [phone]  Mobile Phone: [phone]  Relation: Brother/Sister   needed? No    Past Surgical History:  Past Surgical History:   Procedure Laterality Date    CARDIAC SURGERY      SPINAL FIXATION SURGERY         Immunization History:     There is no immunization history on file for this patient.    Active Problems:  Patient Active Problem List   Diagnosis Code    Seizure ([facility]) R56.9    Fever R50.9    Sepsis ([facility]) A41.9    Developmental delay R62.50    Hx of myocarditis Z86.79    Status epilepticus ([facility]) G40.901    Acute respiratory failure with hypoxia ([facility]) J96.01    Mucoid impaction of bronchi T17.500A    Atelectasis of left lung J98.11    Acute bronchitis J20.9       Isolation/Infection:   Isolation            Contact          Patient Infection Status       Infection Onset Added Last Indicated Last Indicated By Review Planned Expiration Resolved Resolved By    MRSA 24 MRSA DNA Probe, Nasal        Resolved    COVID-19 23 COVID-19 & Influenza Combo   24 Aiden Cote, KAMRYN                       Nurse Assessment:  Last Vital Signs: BP (!) 142/86   Pulse (!) 104   Temp 98 °F (36.7 °C) (Axillary)   Resp 18   Ht 1.346 m (4' 5\")   Wt 38.6 kg (85 lb 1.6 oz)   SpO2 95%   BMI

## 2024-01-13 NOTE — PROGRESS NOTES
Pt alert to self only, VSS RA. Pt tolerating ensure drinks with assist. Patient taking oral medications well with yogurt. Voiding via incontinence briefs, one small formed BM this shift.   All standard fall and safety precautions remain in place. Plan of care continues.

## 2024-01-13 NOTE — PLAN OF CARE
Problem: Discharge Planning  Goal: Discharge to home or other facility with appropriate resources  1/13/2024 1040 by Margo Sood, RN  Outcome: Progressing  Flowsheets (Taken 1/13/2024 1040)  Discharge to home or other facility with appropriate resources:   Identify barriers to discharge with patient and caregiver   Arrange for needed discharge resources and transportation as appropriate  Note: Nutrition a barrier to discharge, encourage fluids 1:1 feeds      Problem: Neurosensory - Adult  Goal: Remains free of injury related to seizures activity  1/13/2024 1040 by Margo Sood, RN  Outcome: Progressing  Flowsheets (Taken 1/13/2024 1040)  Remains free of injury related to seizure activity:   Maintain airway, patient safety  and administer oxygen as ordered   Monitor patient for seizure activity, document and report duration and description of seizure to Licensed Independent Practitioner  Note: Seizure precautions in place

## 2024-01-13 NOTE — PLAN OF CARE
Problem: Discharge Planning  Goal: Discharge to home or other facility with appropriate resources  1/13/2024 1249 by Margo Sood RN  Outcome: Completed  1/13/2024 1040 by Margo Sood RN  Outcome: Progressing  Flowsheets (Taken 1/13/2024 1040)  Discharge to home or other facility with appropriate resources:   Identify barriers to discharge with patient and caregiver   Arrange for needed discharge resources and transportation as appropriate  Note: Nutrition a barrier to discharge, encourage fluids 1:1 feeds   1/12/2024 2323 by Katalina Harper RN  Outcome: Progressing     Problem: Neurosensory - Adult  Goal: Achieves stable or improved neurological status  1/13/2024 1249 by Margo Sood RN  Outcome: Completed  1/12/2024 2323 by Katalina Harper RN  Outcome: Progressing  Goal: Absence of seizures  1/13/2024 1249 by Margo Sood RN  Outcome: Completed  1/12/2024 2323 by Katalina Harper RN  Outcome: Progressing  Goal: Remains free of injury related to seizures activity  1/13/2024 1249 by Margo Sood RN  Outcome: Completed  1/13/2024 1040 by Margo Sood RN  Outcome: Progressing  Flowsheets (Taken 1/13/2024 1040)  Remains free of injury related to seizure activity:   Maintain airway, patient safety  and administer oxygen as ordered   Monitor patient for seizure activity, document and report duration and description of seizure to Licensed Independent Practitioner  Note: Seizure precautions in place   1/12/2024 2323 by Katalina Harper RN  Outcome: Progressing  Goal: Achieves maximal functionality and self care  1/13/2024 1249 by Margo Sood RN  Outcome: Completed  1/12/2024 2323 by Katalina Harper RN  Outcome: Progressing     Problem: Respiratory - Adult  Goal: Achieves optimal ventilation and oxygenation  1/13/2024 1249 by Margo Sood RN  Outcome: Completed  1/12/2024 2323 by Katalina Harper RN  Outcome: Progressing     Problem: Skin/Tissue Integrity - Adult  Goal: Skin

## 2024-01-13 NOTE — DISCHARGE SUMMARY
V2.0  Discharge Summary    Name:  Jose Rodríguez /Age/Sex: 2000 (23 y.o. 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 Patient was intubated for airway protection. Was admitted with status epilepticus with aspiration pneumonia. Underwent bronchoscopy on admission. Extubated on 2024. Neurology consulted. Continued on Keppra and Depakote increased from bid to tid as Depakote value was low on admission. Seizure free after admission. Completed Unasyn course of PNA and was stable on room air. PO tolerance gradually improved and discharged once adequately tolerating oral AEDs.    The patient expressed appropriate understanding of, and agreement with the discharge recommendations, medications, and plan.     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 within 2 weeks  Diet:  pureed diet    Activity: activity as tolerated  Disposition: Discharged to:   []Home, []C, [x]SNF, []Acute Rehab, []Hospice   Condition on discharge: Stable  Labs and Tests to be Followed up as an outpatient by PCP or Specialist: N/A    Discharge Medications:        Medication List        CHANGE how you take these medications      divalproex 125 MG DR capsule  Commonly known as: DEPAKOTE SPRINKLE  Take 2 capsules by mouth in the morning, at noon, and at bedtime  What changed: when to  AM

## 2024-01-13 NOTE — PLAN OF CARE
Problem: Pain  Goal: Verbalizes/displays adequate comfort level or baseline comfort level  1/12/2024 2323 by Katalina Harper, RN  Outcome: Progressing     Problem: Skin/Tissue Integrity  Goal: Absence of new skin breakdown  Description: 1.  Monitor for areas of redness and/or skin breakdown  2.  Assess vascular access sites hourly  3.  Every 4-6 hours minimum:  Change oxygen saturation probe site  4.  Every 4-6 hours:  If on nasal continuous positive airway pressure, respiratory therapy assess nares and determine need for appliance change or resting period.  1/12/2024 2323 by Katalina Harper, RN  Outcome: Progressing     Problem: ABCDS Injury Assessment  Goal: Absence of physical injury  1/12/2024 2323 by Katalina Harper, RN  Outcome: Progressing     Problem: Nutrition Deficit:  Goal: Optimize nutritional status  1/12/2024 2323 by Katalina Harper, RN  Outcome: Progressing

## 2024-01-13 NOTE — PLAN OF CARE
Problem: Discharge Planning  Goal: Discharge to home or other facility with appropriate resources  1/12/2024 2323 by Katalina Harper, RN  Outcome: Progressing          Problem: Safety - Adult  Goal: Free from fall injury  1/12/2024 2323 by Katalina Harper, RN  Outcome: Progressing  All fall precautions in place. Bed locked and in lowest position with alarm on. Overbed table and personal belonings within reach. Call light within reach and patient instructed to use call light for assistance. Non-skid socks on.

## 2024-03-28 ENCOUNTER — APPOINTMENT (OUTPATIENT)
Dept: GENERAL RADIOLOGY | Age: 24
DRG: 871 | End: 2024-03-28
Payer: MEDICARE

## 2024-03-28 ENCOUNTER — HOSPITAL ENCOUNTER (INPATIENT)
Age: 24
LOS: 2 days | Discharge: HOME | DRG: 871 | End: 2024-03-31
Attending: EMERGENCY MEDICINE | Admitting: FAMILY MEDICINE
Payer: MEDICARE

## 2024-03-28 DIAGNOSIS — D72.825 BANDEMIA: ICD-10-CM

## 2024-03-28 DIAGNOSIS — U07.1 COVID-19: Primary | ICD-10-CM

## 2024-03-28 DIAGNOSIS — J11.1 INFLUENZA WITH RESPIRATORY MANIFESTATION OTHER THAN PNEUMONIA: ICD-10-CM

## 2024-03-28 DIAGNOSIS — E87.20 LACTIC ACIDOSIS: ICD-10-CM

## 2024-03-28 DIAGNOSIS — R00.0 TACHYCARDIA: ICD-10-CM

## 2024-03-28 LAB
ALBUMIN SERPL-MCNC: 4.2 G/DL (ref 3.4–5)
ALBUMIN/GLOB SERPL: 1.3 {RATIO} (ref 1.1–2.2)
ALP SERPL-CCNC: 67 U/L (ref 40–129)
ALT SERPL-CCNC: 15 U/L (ref 10–40)
ANION GAP SERPL CALCULATED.3IONS-SCNC: 16 MMOL/L (ref 3–16)
ANISOCYTOSIS BLD QL SMEAR: ABNORMAL
AST SERPL-CCNC: 27 U/L (ref 15–37)
BASOPHILS # BLD: 0 K/UL (ref 0–0.2)
BASOPHILS NFR BLD: 0 %
BILIRUB SERPL-MCNC: 0.3 MG/DL (ref 0–1)
BUN SERPL-MCNC: 21 MG/DL (ref 7–20)
CALCIUM SERPL-MCNC: 10 MG/DL (ref 8.3–10.6)
CHLORIDE SERPL-SCNC: 101 MMOL/L (ref 99–110)
CO2 SERPL-SCNC: 17 MMOL/L (ref 21–32)
CREAT SERPL-MCNC: <0.5 MG/DL (ref 0.9–1.3)
DEPRECATED RDW RBC AUTO: 14.3 % (ref 12.4–15.4)
EOSINOPHIL # BLD: 0 K/UL (ref 0–0.6)
EOSINOPHIL NFR BLD: 0 %
FLUAV RNA RESP QL NAA+PROBE: DETECTED
FLUBV RNA RESP QL NAA+PROBE: NOT DETECTED
GFR SERPLBLD CREATININE-BSD FMLA CKD-EPI: >90 ML/MIN/{1.73_M2}
GLUCOSE SERPL-MCNC: 100 MG/DL (ref 70–99)
HCT VFR BLD AUTO: 42.2 % (ref 40.5–52.5)
HGB BLD-MCNC: 13.9 G/DL (ref 13.5–17.5)
LACTATE BLDV-SCNC: 2.7 MMOL/L (ref 0.4–1.9)
LYMPHOCYTES # BLD: 1 K/UL (ref 1–5.1)
LYMPHOCYTES NFR BLD: 11 %
MCH RBC QN AUTO: 31.1 PG (ref 26–34)
MCHC RBC AUTO-ENTMCNC: 32.8 G/DL (ref 31–36)
MCV RBC AUTO: 94.7 FL (ref 80–100)
METAMYELOCYTES NFR BLD MANUAL: 1 %
MONOCYTES # BLD: 1 K/UL (ref 0–1.3)
MONOCYTES NFR BLD: 12 %
NEUTROPHILS # BLD: 6.7 K/UL (ref 1.7–7.7)
NEUTROPHILS NFR BLD: 42 %
NEUTS BAND NFR BLD MANUAL: 34 % (ref 0–7)
PATH INTERP BLD-IMP: YES
PLATELET # BLD AUTO: 103 K/UL (ref 135–450)
PLATELET BLD QL SMEAR: ABNORMAL
PMV BLD AUTO: 9.1 FL (ref 5–10.5)
POTASSIUM SERPL-SCNC: 4.1 MMOL/L (ref 3.5–5.1)
PROT SERPL-MCNC: 7.5 G/DL (ref 6.4–8.2)
RBC # BLD AUTO: 4.46 M/UL (ref 4.2–5.9)
SARS-COV-2 RNA RESP QL NAA+PROBE: DETECTED
SLIDE REVIEW: ABNORMAL
SODIUM SERPL-SCNC: 134 MMOL/L (ref 136–145)
WBC # BLD AUTO: 8.7 K/UL (ref 4–11)

## 2024-03-28 PROCEDURE — 87040 BLOOD CULTURE FOR BACTERIA: CPT

## 2024-03-28 PROCEDURE — 96360 HYDRATION IV INFUSION INIT: CPT

## 2024-03-28 PROCEDURE — 6370000000 HC RX 637 (ALT 250 FOR IP): Performed by: NURSE PRACTITIONER

## 2024-03-28 PROCEDURE — 85025 COMPLETE CBC W/AUTO DIFF WBC: CPT

## 2024-03-28 PROCEDURE — 96361 HYDRATE IV INFUSION ADD-ON: CPT

## 2024-03-28 PROCEDURE — 99285 EMERGENCY DEPT VISIT HI MDM: CPT

## 2024-03-28 PROCEDURE — 87636 SARSCOV2 & INF A&B AMP PRB: CPT

## 2024-03-28 PROCEDURE — 6370000000 HC RX 637 (ALT 250 FOR IP): Performed by: EMERGENCY MEDICINE

## 2024-03-28 PROCEDURE — 2580000003 HC RX 258: Performed by: NURSE PRACTITIONER

## 2024-03-28 PROCEDURE — 80053 COMPREHEN METABOLIC PANEL: CPT

## 2024-03-28 PROCEDURE — 83605 ASSAY OF LACTIC ACID: CPT

## 2024-03-28 PROCEDURE — 71045 X-RAY EXAM CHEST 1 VIEW: CPT

## 2024-03-28 RX ORDER — 0.9 % SODIUM CHLORIDE 0.9 %
1000 INTRAVENOUS SOLUTION INTRAVENOUS ONCE
Status: COMPLETED | OUTPATIENT
Start: 2024-03-28 | End: 2024-03-28

## 2024-03-28 RX ORDER — IBUPROFEN 400 MG/1
400 TABLET ORAL ONCE
Status: DISCONTINUED | OUTPATIENT
Start: 2024-03-28 | End: 2024-03-28

## 2024-03-28 RX ORDER — OSELTAMIVIR PHOSPHATE 6 MG/ML
75 FOR SUSPENSION ORAL ONCE
Status: COMPLETED | OUTPATIENT
Start: 2024-03-28 | End: 2024-03-28

## 2024-03-28 RX ADMIN — OSELTAMIVIR PHOSPHATE 75 MG: 6 POWDER, FOR SUSPENSION ORAL at 23:55

## 2024-03-28 RX ADMIN — SODIUM CHLORIDE 1000 ML: 9 INJECTION, SOLUTION INTRAVENOUS at 21:56

## 2024-03-28 RX ADMIN — IBUPROFEN 200 MG: 100 SUSPENSION ORAL at 23:55

## 2024-03-28 ASSESSMENT — LIFESTYLE VARIABLES
HOW MANY STANDARD DRINKS CONTAINING ALCOHOL DO YOU HAVE ON A TYPICAL DAY: PATIENT DOES NOT DRINK
HOW OFTEN DO YOU HAVE A DRINK CONTAINING ALCOHOL: NEVER

## 2024-03-29 PROBLEM — D69.6 THROMBOCYTOPENIA (HCC): Status: ACTIVE | Noted: 2024-03-29

## 2024-03-29 PROBLEM — U07.1 COVID-19 VIRUS INFECTION: Status: ACTIVE | Noted: 2024-03-29

## 2024-03-29 PROBLEM — R79.89 ELEVATED LACTIC ACID LEVEL: Status: ACTIVE | Noted: 2024-03-29

## 2024-03-29 PROBLEM — R79.82 ELEVATED C-REACTIVE PROTEIN (CRP): Status: ACTIVE | Noted: 2024-03-29

## 2024-03-29 PROBLEM — E87.20 METABOLIC ACIDEMIA: Status: ACTIVE | Noted: 2024-03-29

## 2024-03-29 PROBLEM — J10.1 INFLUENZA A: Status: ACTIVE | Noted: 2024-03-29

## 2024-03-29 LAB
25(OH)D3 SERPL-MCNC: 57.2 NG/ML
ANION GAP SERPL CALCULATED.3IONS-SCNC: 17 MMOL/L (ref 3–16)
BASOPHILS # BLD: 0 K/UL (ref 0–0.2)
BASOPHILS NFR BLD: 0.2 %
BUN SERPL-MCNC: 13 MG/DL (ref 7–20)
CALCIUM SERPL-MCNC: 9.9 MG/DL (ref 8.3–10.6)
CHLORIDE SERPL-SCNC: 100 MMOL/L (ref 99–110)
CO2 SERPL-SCNC: 15 MMOL/L (ref 21–32)
CREAT SERPL-MCNC: <0.5 MG/DL (ref 0.9–1.3)
CRP SERPL-MCNC: 85.8 MG/L (ref 0–5.1)
DEPRECATED RDW RBC AUTO: 14.2 % (ref 12.4–15.4)
EOSINOPHIL # BLD: 0 K/UL (ref 0–0.6)
EOSINOPHIL NFR BLD: 0.1 %
GFR SERPLBLD CREATININE-BSD FMLA CKD-EPI: >90 ML/MIN/{1.73_M2}
GLUCOSE SERPL-MCNC: 121 MG/DL (ref 70–99)
HCT VFR BLD AUTO: 42.3 % (ref 40.5–52.5)
HGB BLD-MCNC: 14.2 G/DL (ref 13.5–17.5)
LACTATE BLDV-SCNC: 1.4 MMOL/L (ref 0.4–2)
LACTATE BLDV-SCNC: 2.3 MMOL/L (ref 0.4–1.9)
LACTATE BLDV-SCNC: 2.4 MMOL/L (ref 0.4–1.9)
LYMPHOCYTES # BLD: 0.3 K/UL (ref 1–5.1)
LYMPHOCYTES NFR BLD: 4.6 %
MCH RBC QN AUTO: 30.9 PG (ref 26–34)
MCHC RBC AUTO-ENTMCNC: 33.7 G/DL (ref 31–36)
MCV RBC AUTO: 91.7 FL (ref 80–100)
MONOCYTES # BLD: 0.9 K/UL (ref 0–1.3)
MONOCYTES NFR BLD: 12.1 %
NEUTROPHILS # BLD: 6.3 K/UL (ref 1.7–7.7)
NEUTROPHILS NFR BLD: 83 %
PATH INTERP BLD-IMP: NO
PLATELET # BLD AUTO: 82 K/UL (ref 135–450)
PLATELET BLD QL SMEAR: ABNORMAL
PMV BLD AUTO: 11.2 FL (ref 5–10.5)
POTASSIUM SERPL-SCNC: 4.1 MMOL/L (ref 3.5–5.1)
RBC # BLD AUTO: 4.61 M/UL (ref 4.2–5.9)
RBC MORPH BLD: NORMAL
SLIDE REVIEW: ABNORMAL
SODIUM SERPL-SCNC: 132 MMOL/L (ref 136–145)
WBC # BLD AUTO: 7.6 K/UL (ref 4–11)

## 2024-03-29 PROCEDURE — 82306 VITAMIN D 25 HYDROXY: CPT

## 2024-03-29 PROCEDURE — 2580000003 HC RX 258: Performed by: FAMILY MEDICINE

## 2024-03-29 PROCEDURE — 6370000000 HC RX 637 (ALT 250 FOR IP): Performed by: STUDENT IN AN ORGANIZED HEALTH CARE EDUCATION/TRAINING PROGRAM

## 2024-03-29 PROCEDURE — 36415 COLL VENOUS BLD VENIPUNCTURE: CPT

## 2024-03-29 PROCEDURE — 86140 C-REACTIVE PROTEIN: CPT

## 2024-03-29 PROCEDURE — 85025 COMPLETE CBC W/AUTO DIFF WBC: CPT

## 2024-03-29 PROCEDURE — 80048 BASIC METABOLIC PNL TOTAL CA: CPT

## 2024-03-29 PROCEDURE — C9113 INJ PANTOPRAZOLE SODIUM, VIA: HCPCS | Performed by: FAMILY MEDICINE

## 2024-03-29 PROCEDURE — 99223 1ST HOSP IP/OBS HIGH 75: CPT | Performed by: INTERNAL MEDICINE

## 2024-03-29 PROCEDURE — 2580000003 HC RX 258: Performed by: STUDENT IN AN ORGANIZED HEALTH CARE EDUCATION/TRAINING PROGRAM

## 2024-03-29 PROCEDURE — 2580000003 HC RX 258: Performed by: INTERNAL MEDICINE

## 2024-03-29 PROCEDURE — 6360000002 HC RX W HCPCS: Performed by: STUDENT IN AN ORGANIZED HEALTH CARE EDUCATION/TRAINING PROGRAM

## 2024-03-29 PROCEDURE — 6370000000 HC RX 637 (ALT 250 FOR IP): Performed by: FAMILY MEDICINE

## 2024-03-29 PROCEDURE — 2060000000 HC ICU INTERMEDIATE R&B

## 2024-03-29 PROCEDURE — 6360000002 HC RX W HCPCS: Performed by: NURSE PRACTITIONER

## 2024-03-29 PROCEDURE — 2500000003 HC RX 250 WO HCPCS: Performed by: NURSE PRACTITIONER

## 2024-03-29 PROCEDURE — 83605 ASSAY OF LACTIC ACID: CPT

## 2024-03-29 PROCEDURE — 2580000003 HC RX 258: Performed by: NURSE PRACTITIONER

## 2024-03-29 PROCEDURE — 6360000002 HC RX W HCPCS: Performed by: FAMILY MEDICINE

## 2024-03-29 RX ORDER — KETOROLAC TROMETHAMINE 30 MG/ML
30 INJECTION, SOLUTION INTRAMUSCULAR; INTRAVENOUS ONCE
Status: COMPLETED | OUTPATIENT
Start: 2024-03-29 | End: 2024-03-29

## 2024-03-29 RX ORDER — PANTOPRAZOLE SODIUM 40 MG/10ML
40 INJECTION, POWDER, LYOPHILIZED, FOR SOLUTION INTRAVENOUS DAILY
Status: DISCONTINUED | OUTPATIENT
Start: 2024-03-29 | End: 2024-03-31 | Stop reason: HOSPADM

## 2024-03-29 RX ORDER — ACETAMINOPHEN 325 MG/1
650 TABLET ORAL EVERY 6 HOURS PRN
Status: DISCONTINUED | OUTPATIENT
Start: 2024-03-29 | End: 2024-03-31 | Stop reason: HOSPADM

## 2024-03-29 RX ORDER — ONDANSETRON 2 MG/ML
4 INJECTION INTRAMUSCULAR; INTRAVENOUS EVERY 6 HOURS PRN
Status: DISCONTINUED | OUTPATIENT
Start: 2024-03-29 | End: 2024-03-31 | Stop reason: HOSPADM

## 2024-03-29 RX ORDER — DEXTROSE AND SODIUM CHLORIDE 5; .9 G/100ML; G/100ML
INJECTION, SOLUTION INTRAVENOUS CONTINUOUS
Status: DISCONTINUED | OUTPATIENT
Start: 2024-03-29 | End: 2024-03-29

## 2024-03-29 RX ORDER — ACETAMINOPHEN 650 MG/1
650 SUPPOSITORY RECTAL EVERY 6 HOURS PRN
Status: DISCONTINUED | OUTPATIENT
Start: 2024-03-29 | End: 2024-03-31 | Stop reason: HOSPADM

## 2024-03-29 RX ORDER — DEXAMETHASONE SODIUM PHOSPHATE 10 MG/ML
6 INJECTION INTRAMUSCULAR; INTRAVENOUS DAILY
Status: DISCONTINUED | OUTPATIENT
Start: 2024-03-29 | End: 2024-03-29

## 2024-03-29 RX ORDER — DIVALPROEX SODIUM 125 MG/1
500 CAPSULE, COATED PELLETS ORAL EVERY 12 HOURS SCHEDULED
Status: DISCONTINUED | OUTPATIENT
Start: 2024-03-29 | End: 2024-03-31 | Stop reason: HOSPADM

## 2024-03-29 RX ORDER — OSELTAMIVIR PHOSPHATE 75 MG/1
75 CAPSULE ORAL 2 TIMES DAILY
Status: DISCONTINUED | OUTPATIENT
Start: 2024-03-29 | End: 2024-03-31 | Stop reason: HOSPADM

## 2024-03-29 RX ORDER — POLYETHYLENE GLYCOL 3350 17 G/17G
17 POWDER, FOR SOLUTION ORAL DAILY PRN
Status: DISCONTINUED | OUTPATIENT
Start: 2024-03-29 | End: 2024-03-31 | Stop reason: HOSPADM

## 2024-03-29 RX ORDER — 0.9 % SODIUM CHLORIDE 0.9 %
500 INTRAVENOUS SOLUTION INTRAVENOUS ONCE
Status: COMPLETED | OUTPATIENT
Start: 2024-03-29 | End: 2024-03-29

## 2024-03-29 RX ORDER — METOPROLOL TARTRATE 1 MG/ML
5 INJECTION, SOLUTION INTRAVENOUS ONCE
Status: COMPLETED | OUTPATIENT
Start: 2024-03-29 | End: 2024-03-29

## 2024-03-29 RX ORDER — SODIUM CHLORIDE 9 MG/ML
INJECTION, SOLUTION INTRAVENOUS PRN
Status: DISCONTINUED | OUTPATIENT
Start: 2024-03-29 | End: 2024-03-31 | Stop reason: HOSPADM

## 2024-03-29 RX ORDER — ONDANSETRON 4 MG/1
4 TABLET, ORALLY DISINTEGRATING ORAL EVERY 8 HOURS PRN
Status: DISCONTINUED | OUTPATIENT
Start: 2024-03-29 | End: 2024-03-31 | Stop reason: HOSPADM

## 2024-03-29 RX ORDER — SODIUM CHLORIDE 9 MG/ML
INJECTION, SOLUTION INTRAVENOUS CONTINUOUS
Status: DISCONTINUED | OUTPATIENT
Start: 2024-03-29 | End: 2024-03-29

## 2024-03-29 RX ORDER — SODIUM CHLORIDE, SODIUM GLUCONATE, SODIUM ACETATE, POTASSIUM CHLORIDE AND MAGNESIUM CHLORIDE 526; 502; 368; 37; 30 MG/100ML; MG/100ML; MG/100ML; MG/100ML; MG/100ML
100 INJECTION, SOLUTION INTRAVENOUS CONTINUOUS
Status: DISCONTINUED | OUTPATIENT
Start: 2024-03-29 | End: 2024-03-30

## 2024-03-29 RX ORDER — DEXAMETHASONE SODIUM PHOSPHATE 10 MG/ML
6 INJECTION INTRAMUSCULAR; INTRAVENOUS EVERY 24 HOURS
Status: DISCONTINUED | OUTPATIENT
Start: 2024-03-29 | End: 2024-03-29

## 2024-03-29 RX ORDER — SODIUM CHLORIDE 0.9 % (FLUSH) 0.9 %
5-40 SYRINGE (ML) INJECTION EVERY 12 HOURS SCHEDULED
Status: DISCONTINUED | OUTPATIENT
Start: 2024-03-29 | End: 2024-03-31 | Stop reason: HOSPADM

## 2024-03-29 RX ORDER — SODIUM CHLORIDE 0.9 % (FLUSH) 0.9 %
5-40 SYRINGE (ML) INJECTION PRN
Status: DISCONTINUED | OUTPATIENT
Start: 2024-03-29 | End: 2024-03-31 | Stop reason: HOSPADM

## 2024-03-29 RX ORDER — ENOXAPARIN SODIUM 100 MG/ML
30 INJECTION SUBCUTANEOUS DAILY
Status: DISCONTINUED | OUTPATIENT
Start: 2024-03-29 | End: 2024-03-29

## 2024-03-29 RX ADMIN — PANTOPRAZOLE SODIUM 40 MG: 40 INJECTION, POWDER, FOR SOLUTION INTRAVENOUS at 09:16

## 2024-03-29 RX ADMIN — Medication 10 ML: at 09:17

## 2024-03-29 RX ADMIN — KETOROLAC TROMETHAMINE 30 MG: 30 INJECTION INTRAMUSCULAR; INTRAVENOUS at 03:47

## 2024-03-29 RX ADMIN — DIVALPROEX SODIUM 500 MG: 125 CAPSULE, COATED PELLETS ORAL at 11:52

## 2024-03-29 RX ADMIN — SODIUM CHLORIDE: 9 INJECTION, SOLUTION INTRAVENOUS at 09:06

## 2024-03-29 RX ADMIN — SODIUM CHLORIDE, SODIUM GLUCONATE, SODIUM ACETATE, POTASSIUM CHLORIDE AND MAGNESIUM CHLORIDE 100 ML/HR: 526; 502; 368; 37; 30 INJECTION, SOLUTION INTRAVENOUS at 14:32

## 2024-03-29 RX ADMIN — ACETAMINOPHEN 650 MG: 650 SUPPOSITORY RECTAL at 02:54

## 2024-03-29 RX ADMIN — LEVETIRACETAM 1000 MG: 100 INJECTION, SOLUTION INTRAVENOUS at 22:33

## 2024-03-29 RX ADMIN — LEVETIRACETAM 1000 MG: 100 INJECTION, SOLUTION INTRAVENOUS at 09:35

## 2024-03-29 RX ADMIN — LEVETIRACETAM 1000 MG: 100 INJECTION, SOLUTION INTRAVENOUS at 05:50

## 2024-03-29 RX ADMIN — DIVALPROEX SODIUM 500 MG: 125 CAPSULE, COATED PELLETS ORAL at 22:33

## 2024-03-29 RX ADMIN — Medication 10 ML: at 22:33

## 2024-03-29 RX ADMIN — OSELTAMIVIR PHOSPHATE 75 MG: 75 CAPSULE ORAL at 11:52

## 2024-03-29 RX ADMIN — OSELTAMIVIR PHOSPHATE 75 MG: 75 CAPSULE ORAL at 22:33

## 2024-03-29 RX ADMIN — SODIUM CHLORIDE 500 ML: 9 INJECTION, SOLUTION INTRAVENOUS at 03:47

## 2024-03-29 RX ADMIN — METOPROLOL TARTRATE 5 MG: 1 INJECTION, SOLUTION INTRAVENOUS at 03:49

## 2024-03-29 RX ADMIN — DEXAMETHASONE SODIUM PHOSPHATE 6 MG: 10 INJECTION INTRAMUSCULAR; INTRAVENOUS at 09:17

## 2024-03-29 NOTE — ED NOTES
Mr. Rodríguez is a 23 y.o. male who had concerns including Fever (Pt has developmental disorder, in wheelchair. Caretaker reports fever of 102.3. Tylenol given at 1830. ).    Chief Complaint   Patient presents with    Fever     Pt has developmental disorder, in wheelchair. Caretaker reports fever of 102.3. Tylenol given at 1830.        He is being admitted for:    COVID-19 virus infection    His ED problem list included:    1. COVID-19    2. Influenza with respiratory manifestation other than pneumonia    3. Lactic acidosis    4. Tachycardia    5. 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     Seizures (HCC)     Wheelchair dependent        Past Surgical History:   Procedure Laterality Date    CARDIAC SURGERY      SPINAL FIXATION SURGERY         His recent abnormal labs were:    Labs Reviewed   COVID-19 & INFLUENZA COMBO - Abnormal; Notable for the following components:       Result Value    SARS-CoV-2 RNA, RT PCR DETECTED (*)     INFLUENZA A DETECTED (*)     All other components within normal limits   CBC WITH AUTO DIFFERENTIAL - Abnormal; Notable for the following components:    Platelets 103 (*)     Bands Relative 34 (*)     Metamyelocytes Relative 1 (*)     Anisocytosis Occasional (*)     All other components within normal limits   COMPREHENSIVE METABOLIC PANEL W/ REFLEX TO MG FOR LOW K - Abnormal; Notable for the following components:    Sodium 134 (*)     CO2 17 (*)     Glucose 100 (*)     BUN 21 (*)     Creatinine <0.5 (*)     All other components within normal limits   LACTATE, SEPSIS - Abnormal; Notable for the following components:    Lactic Acid, Sepsis 2.7 (*)     All other components within normal limits   LACTATE, SEPSIS - Abnormal; Notable for the following components:    Lactic Acid, Sepsis 2.3 (*)     All other components within normal limits   CULTURE, BLOOD 1   CULTURE, BLOOD 2   LACTATE, SEPSIS

## 2024-03-29 NOTE — PROGRESS NOTES
4 Eyes Skin Assessment     NAME:  Jose Rodríguez  YOB: 2000  MEDICAL RECORD NUMBER:  [id number]    The patient is being assessed for  Admission    I agree that at least one RN has performed a thorough Head to Toe Skin Assessment on the patient. ALL assessment sites listed below have been assessed.      Areas assessed by both nurses:    Head, Face, Ears, Shoulders, Back, Chest, Arms, Elbows, Hands, Sacrum. Buttock, Coccyx, Ischium, and Legs. Feet and Heels        Does the Patient have a Wound? No noted wound(s)       Priyank Prevention initiated by RN: Yes  Wound Care Orders initiated by RN: No    Pressure Injury (Stage 3,4, Unstageable, DTI, NWPT, and Complex wounds) if present, place Wound referral order by RN under : No    New Ostomies, if present place, Ostomy referral order under : No     Nurse 1 eSignature: Electronically signed by Leanna Moore RN on 3/29/24 at 7:04 AM EDT    **SHARE this note so that the co-signing nurse can place an eSignature**    Nurse 2 eSignature: Electronically signed by Patricio Becerra RN on 3/29/24 at 7:28 AM EDT

## 2024-03-29 NOTE — PROGRESS NOTES
Pt alert. RN unable to assess orientation as patient is non-verbal at baseline. VSS. Shift assessment completed and charted.

## 2024-03-29 NOTE — PROGRESS NOTES
Admitted by my colleague earlier this morning.  Patient seen and examined.      Nonverbal on exam.    On physical exam looks uncomfortable.  Diaphoretic.  Otherwise equal breath sounds bilaterally without any rhonchi or wheezing.  S1 and S2 without any murmurs rubs or gallops.  Does not follow commands but noted to be moving all extremities spontaneously.    COVID-19 infection.  Still having fevers.  Has lactic acidosis.  Was given IV fluid boluses now will place on continuous IV fluids and trend lactic acid.  Likely has lots of losses due to fever and thus we will continue aggressive IV fluid resuscitation.  Diet as tolerated

## 2024-03-29 NOTE — ED PROVIDER NOTES
[facility]  ED  EMERGENCY DEPARTMENT ENCOUNTER      Pt Name: Jose Rodríguez  MRN: [id number]  Birthdate 2000  Date of evaluation: 3/28/2024  Provider: AUDRA Morris CNP  PCP: No primary care provider on file.  Note Started: 9:16 PM EDT 3/28/24     I have seen and evaluated this patient with my supervising physician Alex Taylor MD.      CHIEF COMPLAINT       Chief Complaint   Patient presents with    Fever     Pt has developmental disorder, in wheelchair. Caretaker reports fever of 102.3. Tylenol given at 1830.        HISTORY OF PRESENT ILLNESS: 1 or more Elements     History From: Caregiver  Limitations to history : Cognitive deficit    Jose Rodríguez is a 23 y.o. male who presents to ER with fever. Patient here with care taker. She reports fever and congested chest. The nurse at work was worried about the amount of wheezing in his chest. Cough is bad. Temperature of 102.3. This all just started today. He did get tylenol at 6:30 pm. He lives in a group home. Is wheelchair bound. Denies vomiting or diarrhea.     Nursing Notes were all reviewed and agreed with or any disagreements were addressed in the HPI.    REVIEW OF SYSTEMS :      Review of Systems    Positives and Pertinent negatives as per HPI.     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

## 2024-03-29 NOTE — H&P
Hospital Medicine History & Physical      Date of Admission: 3/28/2024    Date of Service:  Pt seen/examined on 3/29/2024    [x]Admitted to Inpatient with expected LOS greater than two midnights due to medical therapy.  []Placed in Observation status.    Chief Admission Complaint: Fever and tachycardia    Presenting Admission History:      23 y.o. male with past medical history of developmental delay, lives in the group home and was brought to the ER by caregiver due to having high fevers and chest congestion.  Patient was also found to have audible wheezes.  Temperature was as high as 102.3.  Patient is not able to provide any history due to developmental delay.  Workup in the ER showed positive COVID infection and influenza.    Assessment/Plan:    COVID-19 pneumonia  Influenza A  History of seizure disorder  GERD    Plan  COVID-19 pneumonia: Will start oral Decadron due to elevated inflammatory markers.  Place IV hydration.  Tylenol as needed for fevers.  Consult pulmonology for further recommendations.    Influenza A: Patient has been given Tamiflu in the ER, will continue for 5 days.    History of seizure disorder: Resume antiepileptics.    GERD: Resume Protonix.      Discussed management and the need for Hospitalization of the patient w/ the Emergency Department Provider.        Physical Exam Performed:      /79   Pulse (!) 128   Temp (!) 101 °F (38.3 °C) (Rectal)   Resp (!) 32   Ht 1.346 m (4' 5\")   Wt 44.2 kg (97 lb 7.1 oz) Comment: inlcudes seizure precautions as bed not zerod with seizure precautions  SpO2 97%   BMI 24.39 kg/m²     General appearance:  No apparent distress, appears stated age and cooperative.  HEENT:  Pupils equal, round, and reactive to light. Conjunctivae/corneas clear.  Respiratory:  Normal respiratory effort. Clear to auscultation, bilaterally without Rales/Wheezes/Rhonchi.  Cardiovascular:  Regular rate and rhythm with normal S1/S2 without murmurs, rubs or

## 2024-03-30 LAB
ANION GAP SERPL CALCULATED.3IONS-SCNC: 15 MMOL/L (ref 3–16)
BASOPHILS # BLD: 0 K/UL (ref 0–0.2)
BASOPHILS NFR BLD: 0.3 %
BUN SERPL-MCNC: 7 MG/DL (ref 7–20)
CALCIUM SERPL-MCNC: 7.8 MG/DL (ref 8.3–10.6)
CHLORIDE SERPL-SCNC: 97 MMOL/L (ref 99–110)
CO2 SERPL-SCNC: 18 MMOL/L (ref 21–32)
CREAT SERPL-MCNC: <0.5 MG/DL (ref 0.9–1.3)
DAT POLY-SP REAG RBC QL: NORMAL
DEPRECATED RDW RBC AUTO: 13.8 % (ref 12.4–15.4)
EOSINOPHIL # BLD: 0 K/UL (ref 0–0.6)
EOSINOPHIL NFR BLD: 0 %
GFR SERPLBLD CREATININE-BSD FMLA CKD-EPI: >90 ML/MIN/{1.73_M2}
GLUCOSE SERPL-MCNC: 120 MG/DL (ref 70–99)
HCT VFR BLD AUTO: 30.8 % (ref 40.5–52.5)
HGB BLD-MCNC: 10.5 G/DL (ref 13.5–17.5)
LYMPHOCYTES # BLD: 0.9 K/UL (ref 1–5.1)
LYMPHOCYTES NFR BLD: 10.8 %
MCH RBC QN AUTO: 31.4 PG (ref 26–34)
MCHC RBC AUTO-ENTMCNC: 34.1 G/DL (ref 31–36)
MCV RBC AUTO: 91.9 FL (ref 80–100)
MONOCYTES # BLD: 0.6 K/UL (ref 0–1.3)
MONOCYTES NFR BLD: 7.5 %
NEUTROPHILS # BLD: 6.6 K/UL (ref 1.7–7.7)
NEUTROPHILS NFR BLD: 81.4 %
PLATELET # BLD AUTO: 78 K/UL (ref 135–450)
PMV BLD AUTO: 8.6 FL (ref 5–10.5)
POTASSIUM SERPL-SCNC: 3.6 MMOL/L (ref 3.5–5.1)
RBC # BLD AUTO: 3.35 M/UL (ref 4.2–5.9)
SODIUM SERPL-SCNC: 130 MMOL/L (ref 136–145)
WBC # BLD AUTO: 8.1 K/UL (ref 4–11)

## 2024-03-30 PROCEDURE — 83010 ASSAY OF HAPTOGLOBIN QUANT: CPT

## 2024-03-30 PROCEDURE — 99232 SBSQ HOSP IP/OBS MODERATE 35: CPT | Performed by: INTERNAL MEDICINE

## 2024-03-30 PROCEDURE — C9113 INJ PANTOPRAZOLE SODIUM, VIA: HCPCS | Performed by: FAMILY MEDICINE

## 2024-03-30 PROCEDURE — 6360000002 HC RX W HCPCS: Performed by: STUDENT IN AN ORGANIZED HEALTH CARE EDUCATION/TRAINING PROGRAM

## 2024-03-30 PROCEDURE — 6370000000 HC RX 637 (ALT 250 FOR IP): Performed by: FAMILY MEDICINE

## 2024-03-30 PROCEDURE — 2060000000 HC ICU INTERMEDIATE R&B

## 2024-03-30 PROCEDURE — 6370000000 HC RX 637 (ALT 250 FOR IP): Performed by: STUDENT IN AN ORGANIZED HEALTH CARE EDUCATION/TRAINING PROGRAM

## 2024-03-30 PROCEDURE — 2580000003 HC RX 258: Performed by: INTERNAL MEDICINE

## 2024-03-30 PROCEDURE — 80048 BASIC METABOLIC PNL TOTAL CA: CPT

## 2024-03-30 PROCEDURE — 85025 COMPLETE CBC W/AUTO DIFF WBC: CPT

## 2024-03-30 PROCEDURE — 2500000003 HC RX 250 WO HCPCS: Performed by: INTERNAL MEDICINE

## 2024-03-30 PROCEDURE — 83540 ASSAY OF IRON: CPT

## 2024-03-30 PROCEDURE — 36415 COLL VENOUS BLD VENIPUNCTURE: CPT

## 2024-03-30 PROCEDURE — 2580000003 HC RX 258: Performed by: STUDENT IN AN ORGANIZED HEALTH CARE EDUCATION/TRAINING PROGRAM

## 2024-03-30 PROCEDURE — 2580000003 HC RX 258: Performed by: FAMILY MEDICINE

## 2024-03-30 PROCEDURE — 6360000002 HC RX W HCPCS: Performed by: FAMILY MEDICINE

## 2024-03-30 PROCEDURE — 86880 COOMBS TEST DIRECT: CPT

## 2024-03-30 PROCEDURE — 82728 ASSAY OF FERRITIN: CPT

## 2024-03-30 PROCEDURE — 83550 IRON BINDING TEST: CPT

## 2024-03-30 RX ADMIN — Medication 10 ML: at 09:44

## 2024-03-30 RX ADMIN — OSELTAMIVIR PHOSPHATE 75 MG: 75 CAPSULE ORAL at 21:09

## 2024-03-30 RX ADMIN — PANTOPRAZOLE SODIUM 40 MG: 40 INJECTION, POWDER, FOR SOLUTION INTRAVENOUS at 09:43

## 2024-03-30 RX ADMIN — OSELTAMIVIR PHOSPHATE 75 MG: 75 CAPSULE ORAL at 09:44

## 2024-03-30 RX ADMIN — LEVETIRACETAM 1000 MG: 100 INJECTION, SOLUTION INTRAVENOUS at 09:38

## 2024-03-30 RX ADMIN — SODIUM CHLORIDE, SODIUM GLUCONATE, SODIUM ACETATE, POTASSIUM CHLORIDE AND MAGNESIUM CHLORIDE 100 ML/HR: 526; 502; 368; 37; 30 INJECTION, SOLUTION INTRAVENOUS at 04:37

## 2024-03-30 RX ADMIN — Medication 10 ML: at 21:09

## 2024-03-30 RX ADMIN — DIVALPROEX SODIUM 500 MG: 125 CAPSULE, COATED PELLETS ORAL at 21:09

## 2024-03-30 RX ADMIN — DIVALPROEX SODIUM 500 MG: 125 CAPSULE, COATED PELLETS ORAL at 09:43

## 2024-03-30 RX ADMIN — SODIUM BICARBONATE: 84 INJECTION, SOLUTION INTRAVENOUS at 13:03

## 2024-03-30 NOTE — PROGRESS NOTES
INPATIENT PULMONARY CRITICAL CARE PROGRESS NOTE      Reason for visit     COVID pneumonia     SUBJECTIVE: Patient when seen this morning was sleeping in the bed again with no increased work of breathing, patient was afebrile and hemodynamically maintained and patient has sinus tachycardia on the monitor, patient was not hypoxemic and was on room air oxygen with saturation 95%, patient's urine output has not been recorded in the review, no other pertinent review of system could be obtained         Physical Exam:  Blood pressure (!) 142/94, pulse (!) 109, temperature 98 °F (36.7 °C), temperature source Axillary, resp. rate 20, height 1.346 m (4' 5\"), weight 43.7 kg (96 lb 5.5 oz), SpO2 98 %.'       onstitutional:  No acute distress.   HENT:  Oropharynx is clear and moist. No thyromegaly.  Eyes:  Conjunctivae are normal. Pupils equal, round, and reactive to light. No scleral icterus.   Neck: . No tracheal deviation present. No obvious thyroid mass.   Cardiovascular: Normal rate, regular rhythm, normal heart sounds.  No right ventricular heave. No lower extremity edema.  Pulmonary/Chest: No wheezes.  No rales.  Chest wall is not dull to percussion.  No accessory muscle usage or stridor.   Abdominal: Soft. Bowel sounds present. No distension or hernia. No tenderness.    Musculoskeletal: No cyanosis. No clubbing. No obvious joint deformity.   Lymphadenopathy: No cervical or supraclavicular adenopathy.   Skin: Skin is warm and dry. No rash or nodules on the exposed extremities.  Psychiatric: Normal mood and affect. Behavior is normal.  No anxiety.   Neurologic: Sleeping when evaluated      Results:  CBC:   Recent Labs     03/28/24 2155 03/29/24  0325   WBC 8.7 7.6   HGB 13.9 14.2   HCT 42.2 42.3   MCV 94.7 91.7   * 82*     BMP:   Recent Labs     03/28/24 2155 03/29/24  0325   * 132*   K 4.1 4.1    100   CO2 17* 15*   BUN 21* 13   CREATININE <0.5* <0.5*     LIVER PROFILE:   Recent Labs

## 2024-03-30 NOTE — CONSULTS
[facility]    Consult Note      Requesting Physician:      Tanvir Murrell MD      CHIEF COMPLAINT:      SOB, low platelet count      HISTORY OF PRESENT ILLNESS:    Mr. Rodríguez  is a 23 y.o. male we are seeing in consultation for thrombocytopenia.    23 y.o. male with past medical history of developmental delay, lives in the group home and was brought to the ER by caregiver due to having high fevers and chest congestion.  Patient was also found to have audible wheezes.  Temperature was as high as 102.3.  Patient is not able to provide any history due to developmental delay.  Workup in the ER showed positive COVID infection and influenza.       ICD-10-CM    1. COVID-19  U07.1       2. Influenza with respiratory manifestation other than pneumonia  J11.1       3. Lactic acidosis  E87.20       4. Tachycardia  R00.0       5. 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     Seizures (HCC)     Wheelchair dependent        Past Surgical History:  Past Surgical History:   Procedure Laterality Date    CARDIAC SURGERY      SPINAL FIXATION SURGERY         Current Medications:  Current Facility-Administered Medications   Medication Dose Route Frequency Provider Last Rate Last Admin    sodium bicarbonate 75 mEq in sodium chloride 0.45 % 1,000 mL infusion   IntraVENous Continuous Christian Young  mL/hr at 03/30/24 1303 New Bag at 03/30/24 1303    sodium chloride flush 0.9 % injection 5-40 mL  5-40 mL IntraVENous 2 times per day Alex Taylor MD   10 mL at 03/30/24 0944    sodium chloride flush 0.9 % injection 5-40 mL  5-40 mL IntraVENous PRN Alex Taylor MD        0.9 % sodium chloride infusion   IntraVENous PRN Alex Taylor MD     
3/30/24 @ 1321 notified Onc of consult via PS, Dr. Raina Arita taking calls. Rachel Marcus  
Consult Placed     Who:   Date:  Time:     Electronically signed by Melissa Fletcher on 3/29/2024 at 7:53 AM    
symptomatology  Tamiflu has been started on the patient which can be continued  Patient has empirically started on Decadron by the admitting provider-consider discontinuation if deemed appropriate  Patient does have slightly elevated CRP which needs to be trended  Patient does have metabolic acidemia and patient was given a fluid bolus  Lactic acid levels are trending in the right direction  Patient is normal saline being changed to Plasma-Lyte to decrease incidence of normal pneumonia metabolic acidosis  Will hold off on any bicarb infusion at this time  BMP to be followed closely  Patient does not need any bronchodilators on regular basis  Droplet plus precautions as per [facility] policy  Monitor input output and BMP  Correct electrolytes and whenever necessary basis  Patient has worsening thrombocytopenia  Lovenox was discontinued  SCDs ordered for the patient  Patient does have sinus tachycardia on the monitor which needs to be trended  Patient has been on Depakote and Keppra at home which can be continued  PUD prophylaxis            Electronically signed by:  CONRADO DOWNS MD    3/29/2024    2:08 PM.

## 2024-03-30 NOTE — PROGRESS NOTES
[place]2.[place]    [facility] [place]      Name:  Jose Rodríguez /Age/Sex: 2000  (23 y.o. male)   MRN & CSN:  [id number] & [id number] Encounter Date/Time: 3/30/2024 11:22 AM EDT   Location:  [facility]/0433-01 PCP: No primary care provider on file.     Attending:Tanvir Murrell MD       Hospital Day: 3    Assessment and Recommendations   Jose Rodríguez is a 23 y.o. male who presents with COVID-19 virus infection      Plan:     COVID-19 infection  Sinus tachycardia  Influenza A infection  History of seizure disorder  Lactic acidosis  Thrombocytopenia, anemia  Developmental delay    He is not requiring oxygen for his COVID-19 infection.  No indication for steroids.  As per pulmonology for large course of Tamiflu.  He was febrile for the first 24 hours of his hospitalization and had heart rate in the 130s and 140s.  This has improved significantly with IV fluid resuscitation and now his heart rates are coming down but still in the 110s.  Continue IV fluid resuscitation.  Continue home Depakote and Keppra.  -Suspect anemia is from IV fluid resuscitation as he has no signs of blood loss.  Given worsening thrombocytopenia, suspect bone marrow suppression from viral infection.  However will consult hematology at this time.  Repeat labs for morning.    Discussed with mother over the phone.  Informed her of the course of hospitalization.  She believes patient will do better at new group home in a familiar environment however given risk of dehydration with severe COVID-19 infection I am not comfortable discussing this patient home yet.  Assessment discussed with mother and she reports she will consider it and may sign the patient out AMA.  -Suspect      Diet ADULT DIET; Regular  ADULT ORAL NUTRITION SUPPLEMENT; Breakfast, Lunch, Dinner; Standard High Calorie/High Protein Oral Supplement   DVT Prophylaxis [] Lovenox, []  Heparin, [x] SCDs, [] Ambulation,  [] Eliquis, [] Xarelto  [] Coumadin   Code Status Full

## 2024-03-30 NOTE — PROGRESS NOTES
Pt remains tachycardic 104-119. Pt becomes more tachycardic with pt care/agitation. Vitals are otherwise unremarkable. Pt afebrile this morning. Pt incontinent of bowel and bladder. Pericare provided. Will follow. Lyndsey Morales RN

## 2024-03-31 VITALS
RESPIRATION RATE: 18 BRPM | BODY MASS INDEX: 18.31 KG/M2 | WEIGHT: 93.25 LBS | DIASTOLIC BLOOD PRESSURE: 89 MMHG | TEMPERATURE: 98.6 F | HEIGHT: 60 IN | SYSTOLIC BLOOD PRESSURE: 130 MMHG | HEART RATE: 111 BPM | OXYGEN SATURATION: 92 %

## 2024-03-31 LAB
ANION GAP SERPL CALCULATED.3IONS-SCNC: 12 MMOL/L (ref 3–16)
BASOPHILS # BLD: 0 K/UL (ref 0–0.2)
BASOPHILS NFR BLD: 0.1 %
BUN SERPL-MCNC: 11 MG/DL (ref 7–20)
CALCIUM SERPL-MCNC: 9.8 MG/DL (ref 8.3–10.6)
CHLORIDE SERPL-SCNC: 100 MMOL/L (ref 99–110)
CO2 SERPL-SCNC: 22 MMOL/L (ref 21–32)
CREAT SERPL-MCNC: <0.5 MG/DL (ref 0.9–1.3)
DEPRECATED RDW RBC AUTO: 13.6 % (ref 12.4–15.4)
EOSINOPHIL # BLD: 0 K/UL (ref 0–0.6)
EOSINOPHIL NFR BLD: 0.2 %
FERRITIN SERPL IA-MCNC: 110.2 NG/ML (ref 30–400)
GFR SERPLBLD CREATININE-BSD FMLA CKD-EPI: >90 ML/MIN/{1.73_M2}
GLUCOSE SERPL-MCNC: 88 MG/DL (ref 70–99)
HAPTOGLOB SERPL-MCNC: 213 MG/DL (ref 30–200)
HCT VFR BLD AUTO: 35.7 % (ref 40.5–52.5)
HGB BLD-MCNC: 12.3 G/DL (ref 13.5–17.5)
IRON SATN MFR SERPL: 14 % (ref 20–50)
IRON SERPL-MCNC: 32 UG/DL (ref 59–158)
LYMPHOCYTES # BLD: 1.4 K/UL (ref 1–5.1)
LYMPHOCYTES NFR BLD: 24.2 %
MCH RBC QN AUTO: 31.4 PG (ref 26–34)
MCHC RBC AUTO-ENTMCNC: 34.4 G/DL (ref 31–36)
MCV RBC AUTO: 91.3 FL (ref 80–100)
MONOCYTES # BLD: 0.7 K/UL (ref 0–1.3)
MONOCYTES NFR BLD: 12.9 %
NEUTROPHILS # BLD: 3.6 K/UL (ref 1.7–7.7)
NEUTROPHILS NFR BLD: 62.6 %
PLATELET # BLD AUTO: 88 K/UL (ref 135–450)
PMV BLD AUTO: 8.9 FL (ref 5–10.5)
POTASSIUM SERPL-SCNC: 4.2 MMOL/L (ref 3.5–5.1)
RBC # BLD AUTO: 3.92 M/UL (ref 4.2–5.9)
SODIUM SERPL-SCNC: 134 MMOL/L (ref 136–145)
TIBC SERPL-MCNC: 229 UG/DL (ref 260–445)
WBC # BLD AUTO: 5.8 K/UL (ref 4–11)

## 2024-03-31 PROCEDURE — 2580000003 HC RX 258: Performed by: FAMILY MEDICINE

## 2024-03-31 PROCEDURE — 99232 SBSQ HOSP IP/OBS MODERATE 35: CPT | Performed by: INTERNAL MEDICINE

## 2024-03-31 PROCEDURE — 6360000002 HC RX W HCPCS: Performed by: STUDENT IN AN ORGANIZED HEALTH CARE EDUCATION/TRAINING PROGRAM

## 2024-03-31 PROCEDURE — 85025 COMPLETE CBC W/AUTO DIFF WBC: CPT

## 2024-03-31 PROCEDURE — 2580000003 HC RX 258: Performed by: STUDENT IN AN ORGANIZED HEALTH CARE EDUCATION/TRAINING PROGRAM

## 2024-03-31 PROCEDURE — 6360000002 HC RX W HCPCS: Performed by: FAMILY MEDICINE

## 2024-03-31 PROCEDURE — C9113 INJ PANTOPRAZOLE SODIUM, VIA: HCPCS | Performed by: FAMILY MEDICINE

## 2024-03-31 PROCEDURE — 6370000000 HC RX 637 (ALT 250 FOR IP): Performed by: STUDENT IN AN ORGANIZED HEALTH CARE EDUCATION/TRAINING PROGRAM

## 2024-03-31 PROCEDURE — 80048 BASIC METABOLIC PNL TOTAL CA: CPT

## 2024-03-31 PROCEDURE — 2580000003 HC RX 258: Performed by: INTERNAL MEDICINE

## 2024-03-31 PROCEDURE — 2500000003 HC RX 250 WO HCPCS: Performed by: INTERNAL MEDICINE

## 2024-03-31 PROCEDURE — 6370000000 HC RX 637 (ALT 250 FOR IP): Performed by: FAMILY MEDICINE

## 2024-03-31 PROCEDURE — 36415 COLL VENOUS BLD VENIPUNCTURE: CPT

## 2024-03-31 RX ORDER — OSELTAMIVIR PHOSPHATE 75 MG/1
75 CAPSULE ORAL 2 TIMES DAILY
Qty: 5 CAPSULE | Refills: 0 | Status: SHIPPED | OUTPATIENT
Start: 2024-03-31 | End: 2024-04-03

## 2024-03-31 RX ADMIN — PANTOPRAZOLE SODIUM 40 MG: 40 INJECTION, POWDER, FOR SOLUTION INTRAVENOUS at 09:56

## 2024-03-31 RX ADMIN — SODIUM BICARBONATE: 84 INJECTION, SOLUTION INTRAVENOUS at 10:19

## 2024-03-31 RX ADMIN — Medication 10 ML: at 09:55

## 2024-03-31 RX ADMIN — OSELTAMIVIR PHOSPHATE 75 MG: 75 CAPSULE ORAL at 09:56

## 2024-03-31 RX ADMIN — DIVALPROEX SODIUM 500 MG: 125 CAPSULE, COATED PELLETS ORAL at 09:56

## 2024-03-31 RX ADMIN — LEVETIRACETAM 1000 MG: 100 INJECTION, SOLUTION INTRAVENOUS at 10:04

## 2024-03-31 NOTE — CARE COORDINATION
Aware of dc order. Called pt group home RN Jennifer to notify. She will notify Marisol RN on call today for transport back to . Notified  mother/legal guardian Aimee of return. Provided hospital RN number to Jennifer for  KAMRYN Damon to coordinate pu time.   
CM update; Call placed to [facility] [phone] to complete assessment patient is non verbal.All contact information left on  for call back.Autumn Rai RN    
Case Management Assessment  Initial Evaluation    Date/Time of Evaluation: 3/29/2024 2:38 PM  Assessment Completed by: Autumn Rai RN    If patient is discharged prior to next notation, then this note serves as note for discharge by case management.    Patient Name: Jose Rodríguez                   YOB: 2000  Diagnosis: Lactic acidosis [E87.20]  Tachycardia [R00.0]  Bandemia [D72.825]  Influenza with respiratory manifestation other than pneumonia [J11.1]  COVID-19 virus infection [U07.1]  COVID-19 [U07.1]                   Date / Time: 3/28/2024  8:44 PM    Patient Admission Status: Inpatient   Readmission Risk (Low < 19, Mod (19-27), High > 27): Readmission Risk Score: 17.1    Current PCP: No primary care provider on file.  PCP verified by CM?      Chart Reviewed: Yes      History Provided by: Medical Record, Other (see comment) (Mother leagal guardian)  Patient Orientation: Unable to Assess (Patient is non verbal)    Patient Cognition: Alert    Hospitalization in the last 30 days (Readmission):  No    If yes, Readmission Assessment in CM Navigator will be completed.    Advance Directives:      Code Status: Full Code   Patient's Primary Decision Maker is:      Primary Decision Maker: Aimee Delacruz - Parent, Legal Guardian - [phone]    Discharge Planning:    Patient lives with: Other (Comment) (Staff at Group home) Type of Home: Group Home  Primary Care Giver: Other (Comment) (Staff at [facility])  Patient Support Systems include: Parent, Other (Comment) (Staff at Group home)   Current Financial resources: Medicare, Medicaid  Current community resources: Other (Comment) (Group Home)  Current services prior to admission:              Current DME:              Type of Home Care services:  None    ADLS  Prior functional level: Other (see comment) (Totally dependent for all needs)  Current functional level: Other (see comment) (Totally dependent for all needs : when 
Per Marisol at  they do not have staff to transport pt today. Set up transport for 2 pm with [facility].   
within normal limits

## 2024-03-31 NOTE — PROGRESS NOTES
Report [facility]isol. Chart reviewed. All questions answered. Notified that transport scheduled for 2 pm. Lyndsey Morales RN

## 2024-03-31 NOTE — DISCHARGE SUMMARY
01/05/2024 07:48 AM    COLORU Straw 01/05/2024 07:48 AM    PHUR 7.0 01/05/2024 07:48 AM    PHUR 7.0 01/05/2024 07:48 AM    WBCUA 0-2 01/05/2024 07:48 AM    RBCUA 3-4 01/05/2024 07:48 AM    CLARITYU Clear 01/05/2024 07:48 AM    SPECGRAV 1.020 01/05/2024 07:48 AM    LEUKOCYTESUR Negative 01/05/2024 07:48 AM    UROBILINOGEN 0.2 01/05/2024 07:48 AM    BILIRUBINUR Negative 01/05/2024 07:48 AM    BLOODU TRACE-INTACT 01/05/2024 07:48 AM    GLUCOSEU 100 01/05/2024 07:48 AM    KETUA Negative 01/05/2024 07:48 AM       Blood Cultures:   Lab Results   Component Value Date/Time    BC  03/28/2024 09:55 PM     No Growth to date.  Any change in status will be called.     Lab Results   Component Value Date/Time    BLOODCULT2  03/28/2024 09:55 PM     No Growth to date.  Any change in status will be called.     Organism:   Lab Results   Component Value Date/Time    ORG Staph aureus MRSA 01/05/2024 06:13 PM       Time Spent Discharging patient 39 minutes    Electronically signed by Tanvir Murrell MD on 3/31/2024 at 11:22 AM

## 2024-03-31 NOTE — PROGRESS NOTES
INPATIENT PULMONARY CRITICAL CARE PROGRESS NOTE      Reason for visit     COVID pneumonia     SUBJECTIVE: Patient when seen this morning was sleeping in the bed again with no increased work of breathing, patient was afebrile and hemodynamically maintained, patient is not hypoxemic and was on room air oxygen with saturation 100%, patient is afebrile and he medically maintained, patient has normal sinus rhythm on the monitor now, patient glycemic control was acceptable, no other pertinent review of system of concern         Physical Exam:  Blood pressure 131/69, pulse 100, temperature 98.1 °F (36.7 °C), temperature source Axillary, resp. rate 18, height 1.346 m (4' 5\"), weight 42.3 kg (93 lb 4.1 oz), SpO2 100 %.'       onstitutional:  No acute distress.   HENT:  Oropharynx is clear and moist. No thyromegaly.  Eyes:  Conjunctivae are normal. Pupils equal, round, and reactive to light. No scleral icterus.   Neck: . No tracheal deviation present. No obvious thyroid mass.   Cardiovascular: Normal rate, regular rhythm, normal heart sounds.  No right ventricular heave. No lower extremity edema.  Pulmonary/Chest: No wheezes.  No rales.  Chest wall is not dull to percussion.  No accessory muscle usage or stridor.   Abdominal: Soft. Bowel sounds present. No distension or hernia. No tenderness.    Musculoskeletal: No cyanosis. No clubbing. No obvious joint deformity.   Lymphadenopathy: No cervical or supraclavicular adenopathy.   Skin: Skin is warm and dry. No rash or nodules on the exposed extremities.  Psychiatric: Normal mood and affect. Behavior is normal.  No anxiety.   Neurologic: Sleeping when evaluated      Results:  CBC:   Recent Labs     03/29/24  0325 03/30/24  1016 03/31/24  0518   WBC 7.6 8.1 5.8   HGB 14.2 10.5* 12.3*   HCT 42.3 30.8* 35.7*   MCV 91.7 91.9 91.3   PLT 82* 78* 88*       BMP:   Recent Labs     03/29/24  0325 03/30/24  1016 03/31/24  0518   * 130* 134*   K 4.1 3.6 4.2    97* 100   CO2 15*

## 2024-03-31 NOTE — PROGRESS NOTES
Writer notified pt's mother, Aimee that squad was unable to transport pt's wheelchair. Per pt's mother, someone from his group home will have to pick it up. Writer left voice message for on-call nurseMarisol that wheelchair will be located at front nurses station on [address]. Writer also called group home and spoke with Hubert, who said she would notify their house supervisor that wheelchair will need to be picked up. Lyndsey Morales, KAMRYN

## 2024-03-31 NOTE — PLAN OF CARE
Problem: Discharge Planning  Goal: Discharge to home or other facility with appropriate resources  3/31/2024 1343 by Lyndsey Morales RN  Outcome: Adequate for Discharge  3/31/2024 0315 by Hattie Elkins RN  Outcome: Progressing     Problem: Skin/Tissue Integrity  Goal: Absence of new skin breakdown  Description: 1.  Monitor for areas of redness and/or skin breakdown  2.  Assess vascular access sites hourly  3.  Every 4-6 hours minimum:  Change oxygen saturation probe site  4.  Every 4-6 hours:  If on nasal continuous positive airway pressure, respiratory therapy assess nares and determine need for appliance change or resting period.  3/31/2024 1343 by Lyndsey Morales RN  Outcome: Adequate for Discharge  3/31/2024 0315 by Hattie Elkins RN  Outcome: Progressing     Problem: Safety - Adult  Goal: Free from fall injury  3/31/2024 1343 by Lyndsey Morales RN  Outcome: Adequate for Discharge  3/31/2024 0315 by Hattie Elkins RN  Outcome: Progressing     Problem: Pain  Goal: Verbalizes/displays adequate comfort level or baseline comfort level  3/31/2024 1343 by Lyndsey Morales RN  Outcome: Adequate for Discharge  3/31/2024 0315 by Hattie Elkins RN  Outcome: Progressing

## 2024-03-31 NOTE — DISCHARGE INSTR - COC
belongings (please select all that are sent with patient):  Sippy cup and wheelchair    RN SIGNATURE:  Electronically signed by Lyndsey Morales RN on 3/31/24 at 1:48 PM EDT    CASE MANAGEMENT/SOCIAL WORK SECTION    Inpatient Status Date: ***    Readmission Risk Assessment Score:  Readmission Risk              Risk of Unplanned Readmission:  14           Discharging to Facility/ Agency   Name:   Address:  Phone:  Fax:    Dialysis Facility (if applicable)   Name:  Address:  Dialysis Schedule:  Phone:  Fax:    / signature: {Esignature:[id number]}    PHYSICIAN SECTION    Prognosis: {Prognosis:0566894845}    Condition at Discharge: { Patient Condition:581806758}    Rehab Potential (if transferring to Rehab): {Prognosis:7250959664}    Recommended Labs or Other Treatments After Discharge: ***    Physician Certification: I certify the above information and transfer of Jose Rodríguez  is necessary for the continuing treatment of the diagnosis listed and that he requires {Admit to Appropriate Level of Care:00770} for {GREATER/LESS:583504480} 30 days.     Update Admission H&P: {CHP DME Changes in HandP:479878113}    PHYSICIAN SIGNATURE:  {Esignature:[id number]}

## 2024-03-31 NOTE — ED PROVIDER NOTES
Emergency Department Attending SUPERVISORY Provider Note  Location: [address]  3/28/2024     Chief Complaint   Patient presents with    Fever     Pt has developmental disorder, in wheelchair. Caretaker reports fever of 102.3. Tylenol given at 1830.         PATIENT ID:  Jose Rodríguez is a 23 y.o. 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  Patient is quite tachycardic in the emergency department, persistently in the 140s.  He is otherwise nonverbal.  He is covered up with multiple jackets and blankets in his wheelchair here, acting at the baseline of his mentation per his caregiver who knows him very well.  No falls or injuries from his wheelchair.        Vitals:    03/31/24 0415   BP: 131/69   Pulse: 100   Resp: 18   Temp: 98.1 °F (36.7 °C)   SpO2: 100%        BASIC EXAM:  Patient seen and examined in the emergency department.  Resting comfortably in his wheelchair.  At the baseline of his mentation per caretaker, but also claiming that he is quite sleepy.  Otherwise, still persistently tachycardic      I independently interpreted the Xray: Unremarkable; no obvious infiltrates, no obvious deformities, cardiac silhouette appears normal.     XR CHEST PORTABLE   Final Result   No radiographic evidence of acute pulmonary disease.             Labs Reviewed   COVID-19 & INFLUENZA COMBO - Abnormal; Notable for the following components:       Result Value    SARS-CoV-2 RNA, RT PCR DETECTED (*)     INFLUENZA A DETECTED (*)     All other components within normal limits   CBC WITH AUTO DIFFERENTIAL - Abnormal; Notable for the following components:    Platelets 103 (*)     Bands Relative 34 (*)     Metamyelocytes Relative 1 (*)     Anisocytosis Occasional (*)

## 2024-04-01 LAB
BACTERIA BLD CULT ORG #2: NORMAL
BACTERIA BLD CULT: NORMAL
PATH INTERP BLD-IMP: NORMAL

## 2024-04-04 NOTE — PROGRESS NOTES
Physician Progress Note      PATIENT:               SOLO CARRENO  CSN #:                  [id number]  :                       2000  ADMIT DATE:       3/28/2024 8:44 PM  DISCH DATE:        3/31/2024 3:03 PM  RESPONDING  PROVIDER #:        Tanvir Murrell MD          QUERY TEXT:    Patient presents with cough and fever. Workup revealed + COVID and + Influenza   A with 34% Bands, tachycardia, fever 101.0 and thrombocytopenia. Oncology   consulted for the persistent thrombocytopenia and notes, acute   thrombocytopenia secondary to sepsis secondary to viral infection. After   further review, are you treating patient for sepsis secondary to viral   infection:    The medical record reflects the following:  Risk Factors: covid/influenza positive, seizure  Clinical Indicators:  + COVID and + Influenza A with 34% Bands, tachycardia,   fever 101.0 and thrombocytopenia, crp-85.8  Treatment: lactic, crp, blood cultures, Tylenol, decadron, Ibuprofen, sodium   chloride 1500cc bolus, oncology/hematology consult, supportive care    Thank you,  Oliva Morris RN,BSN,CCDS,CRCR  Options provided:  -- Sepsis secondary to viral infection confirmed present on admission  -- Sepsis ruled out  -- Defer to Hematology/Oncology consultant documentation regarding sepsis   secondary to viral infection  -- Other - I will add my own diagnosis  -- Disagree - Not applicable / Not valid  -- Disagree - Clinically unable to determine / Unknown  -- Refer to Clinical Documentation Reviewer    PROVIDER RESPONSE TEXT:    I defer to hematology/oncology consultant regarding documentation of sepsis   secondary to viral infection.    Query created by: Oliva Morris on 2024 6:38 AM      Electronically signed by:  Tanvir Murrell MD 2024 6:06 PM

## 2024-04-19 ENCOUNTER — HOSPITAL ENCOUNTER (OUTPATIENT)
Age: 24
Discharge: HOME | End: 2024-04-19

## 2024-04-28 PROBLEM — J10.1 INFLUENZA A: Status: RESOLVED | Noted: 2024-03-29 | Resolved: 2024-04-28

## 2025-04-03 ENCOUNTER — HOSPITAL ENCOUNTER (OUTPATIENT)
Age: 25
Discharge: HOME | End: 2025-04-03